# Patient Record
Sex: MALE | Race: WHITE | Employment: OTHER | URBAN - METROPOLITAN AREA
[De-identification: names, ages, dates, MRNs, and addresses within clinical notes are randomized per-mention and may not be internally consistent; named-entity substitution may affect disease eponyms.]

---

## 2017-10-11 ENCOUNTER — APPOINTMENT (OUTPATIENT)
Dept: CT IMAGING | Age: 77
DRG: 682 | End: 2017-10-11
Attending: EMERGENCY MEDICINE
Payer: MEDICARE

## 2017-10-11 ENCOUNTER — HOSPITAL ENCOUNTER (INPATIENT)
Age: 77
LOS: 2 days | Discharge: HOME | DRG: 682 | End: 2017-10-14
Attending: EMERGENCY MEDICINE | Admitting: FAMILY MEDICINE
Payer: MEDICARE

## 2017-10-11 DIAGNOSIS — R10.84 ABDOMINAL PAIN, GENERALIZED: Primary | ICD-10-CM

## 2017-10-11 DIAGNOSIS — R11.2 NON-INTRACTABLE VOMITING WITH NAUSEA, UNSPECIFIED VOMITING TYPE: ICD-10-CM

## 2017-10-11 DIAGNOSIS — D72.828 OTHER ELEVATED WHITE BLOOD CELL (WBC) COUNT: ICD-10-CM

## 2017-10-11 LAB
ALBUMIN SERPL-MCNC: 4.6 G/DL (ref 3.5–5)
ALBUMIN/GLOB SERPL: 1.2 {RATIO} (ref 1.1–2.2)
ALP SERPL-CCNC: 105 U/L (ref 45–117)
ALT SERPL-CCNC: 24 U/L (ref 12–78)
ANION GAP SERPL CALC-SCNC: 12 MMOL/L (ref 5–15)
AST SERPL-CCNC: 14 U/L (ref 15–37)
BASOPHILS # BLD: 0 K/UL (ref 0–0.1)
BASOPHILS NFR BLD: 0 % (ref 0–1)
BILIRUB SERPL-MCNC: 0.9 MG/DL (ref 0.2–1)
BUN SERPL-MCNC: 29 MG/DL (ref 6–20)
BUN/CREAT SERPL: 18 (ref 12–20)
CALCIUM SERPL-MCNC: 10.1 MG/DL (ref 8.5–10.1)
CHLORIDE SERPL-SCNC: 101 MMOL/L (ref 97–108)
CO2 SERPL-SCNC: 22 MMOL/L (ref 21–32)
CREAT SERPL-MCNC: 1.63 MG/DL (ref 0.7–1.3)
DIFFERENTIAL METHOD BLD: ABNORMAL
EOSINOPHIL # BLD: 0 K/UL (ref 0–0.4)
EOSINOPHIL NFR BLD: 0 % (ref 0–7)
ERYTHROCYTE [DISTWIDTH] IN BLOOD BY AUTOMATED COUNT: 14 % (ref 11.5–14.5)
GLOBULIN SER CALC-MCNC: 3.8 G/DL (ref 2–4)
GLUCOSE SERPL-MCNC: 243 MG/DL (ref 65–100)
HCT VFR BLD AUTO: 47 % (ref 36.6–50.3)
HGB BLD-MCNC: 16.4 G/DL (ref 12.1–17)
LYMPHOCYTES # BLD: 0.8 K/UL (ref 0.8–3.5)
LYMPHOCYTES NFR BLD: 4 % (ref 12–49)
MCH RBC QN AUTO: 29.9 PG (ref 26–34)
MCHC RBC AUTO-ENTMCNC: 34.9 G/DL (ref 30–36.5)
MCV RBC AUTO: 85.6 FL (ref 80–99)
MONOCYTES # BLD: 0.2 K/UL (ref 0–1)
MONOCYTES NFR BLD: 1 % (ref 5–13)
NEUTS SEG # BLD: 18.4 K/UL (ref 1.8–8)
NEUTS SEG NFR BLD: 95 % (ref 32–75)
PLATELET # BLD AUTO: 461 K/UL (ref 150–400)
PLATELET COMMENTS,PCOM: ABNORMAL
POTASSIUM SERPL-SCNC: 4.7 MMOL/L (ref 3.5–5.1)
PROT SERPL-MCNC: 8.4 G/DL (ref 6.4–8.2)
RBC # BLD AUTO: 5.49 M/UL (ref 4.1–5.7)
RBC MORPH BLD: ABNORMAL
SODIUM SERPL-SCNC: 135 MMOL/L (ref 136–145)
UR CULT HOLD, URHOLD: NORMAL
WBC # BLD AUTO: 19.4 K/UL (ref 4.1–11.1)

## 2017-10-11 PROCEDURE — 96365 THER/PROPH/DIAG IV INF INIT: CPT

## 2017-10-11 PROCEDURE — 85025 COMPLETE CBC W/AUTO DIFF WBC: CPT | Performed by: EMERGENCY MEDICINE

## 2017-10-11 PROCEDURE — 81001 URINALYSIS AUTO W/SCOPE: CPT | Performed by: EMERGENCY MEDICINE

## 2017-10-11 PROCEDURE — 96375 TX/PRO/DX INJ NEW DRUG ADDON: CPT

## 2017-10-11 PROCEDURE — 99284 EMERGENCY DEPT VISIT MOD MDM: CPT

## 2017-10-11 PROCEDURE — 36415 COLL VENOUS BLD VENIPUNCTURE: CPT | Performed by: EMERGENCY MEDICINE

## 2017-10-11 PROCEDURE — 74176 CT ABD & PELVIS W/O CONTRAST: CPT

## 2017-10-11 PROCEDURE — 96361 HYDRATE IV INFUSION ADD-ON: CPT

## 2017-10-11 PROCEDURE — 80053 COMPREHEN METABOLIC PANEL: CPT | Performed by: EMERGENCY MEDICINE

## 2017-10-11 PROCEDURE — 83690 ASSAY OF LIPASE: CPT | Performed by: EMERGENCY MEDICINE

## 2017-10-11 PROCEDURE — 74011250636 HC RX REV CODE- 250/636: Performed by: EMERGENCY MEDICINE

## 2017-10-11 RX ORDER — LEVOFLOXACIN 5 MG/ML
750 INJECTION, SOLUTION INTRAVENOUS
Status: COMPLETED | OUTPATIENT
Start: 2017-10-11 | End: 2017-10-12

## 2017-10-11 RX ORDER — HYDROMORPHONE HYDROCHLORIDE 1 MG/ML
0.5 INJECTION, SOLUTION INTRAMUSCULAR; INTRAVENOUS; SUBCUTANEOUS ONCE
Status: COMPLETED | OUTPATIENT
Start: 2017-10-11 | End: 2017-10-11

## 2017-10-11 RX ORDER — ONDANSETRON 2 MG/ML
8 INJECTION INTRAMUSCULAR; INTRAVENOUS
Status: COMPLETED | OUTPATIENT
Start: 2017-10-11 | End: 2017-10-11

## 2017-10-11 RX ADMIN — ONDANSETRON 8 MG: 2 INJECTION INTRAMUSCULAR; INTRAVENOUS at 22:47

## 2017-10-11 RX ADMIN — SODIUM CHLORIDE 1000 ML: 900 INJECTION, SOLUTION INTRAVENOUS at 22:47

## 2017-10-11 RX ADMIN — HYDROMORPHONE HYDROCHLORIDE 0.5 MG: 1 INJECTION, SOLUTION INTRAMUSCULAR; INTRAVENOUS; SUBCUTANEOUS at 22:47

## 2017-10-11 NOTE — IP AVS SNAPSHOT
[address] 
[phone] Patient: Eduardo Pastrana MRN: [id number] Current Discharge Medication List  
  
START taking these medications Dose & Instructions Dispensing Information Comments Morning Noon Evening Bedtime  
 levoFLOXacin 250 mg tablet Commonly known as:  Thersia Bring Your last dose was: Your next dose is:    
   
   
 Dose:  250 mg Take 1 Tab by mouth daily for 4 days. Quantity:  4 Tab Refills:  0 CONTINUE these medications which have NOT CHANGED Dose & Instructions Dispensing Information Comments Morning Noon Evening Bedtime  
 ibuprofen 400 mg tablet Commonly known as:  MOTRIN Your last dose was: Your next dose is:    
   
   
 Dose:  400 mg Take 400 mg by mouth every eight (8) hours as needed for Pain. Refills:  0  
     
   
   
   
  
 lisinopril 5 mg tablet Commonly known as:  Gertrudis Needy Your last dose was: Your next dose is:    
   
   
 Dose:  5 mg Take 5 mg by mouth daily. Refills:  0  
     
   
   
   
  
 therapeutic multivitamin tablet Commonly known as:  [facility] Your last dose was: Your next dose is:    
   
   
 Dose:  1 Tab Take 1 Tab by mouth daily. Refills:  0 Where to Get Your Medications Information on where to get these meds will be given to you by the nurse or doctor. ! Ask your nurse or doctor about these medications  
  levoFLOXacin 250 mg tablet

## 2017-10-11 NOTE — IP AVS SNAPSHOT
[address] 
[phone] Patient: Rose Rao MRN: [id number] You are allergic to the following Allergen Reactions Codeine Vertigo Contrast Dye (Iodine) Unknown (comments) Pcn (Penicillins) Hives Immunizations Administered for This Admission Name Date Influenza Vaccine (Quad) PF  Deferred () Recent Documentation Height Weight BMI Smoking Status 1.803 m 83.9 kg 25.8 kg/m2 Never Smoker Unresulted Labs Order Current Status CULTURE, BLOOD, PAIRED Preliminary result Emergency Contacts Name Discharge Info Relation Home Work Mobile Rosaline Eason DISCHARGE CAREGIVER [3] Daughter [21] [phone] About your hospitalization You were admitted on:  October 12, 2017 You last received care in the:  [place] [address] You were discharged on:  October 14, 2017 Unit phone number:  [phone] Why you were hospitalized Your primary diagnosis was:  José (Acute Kidney Injury) (Hcc) Your diagnoses also included:  Leukocytosis, Generalized Abdominal Pain Providers Seen During Your Hospitalizations Provider Role Specialty Primary office phone Beena Painting MD Attending Provider Emergency Medicine [phone] Yanet Hirsch MD Attending Provider [facility] [phone] Courtney Talley MD Attending Provider Internal Medicine [phone] Your Primary Care Physician (PCP) Primary Care Physician Office Phone Office Fax NONE ** None ** ** None ** Follow-up Information Follow up With Details Comments Contact Info None   None (395) Patient stated that they have no PCP None   None (395) Patient stated that they have no PCP Current Discharge Medication List  
  
START taking these medications Dose & Instructions Dispensing Information Comments Morning Noon Evening Bedtime  
 levoFLOXacin 250 mg tablet Commonly known as:  Amira Caal Your last dose was: Your next dose is:    
   
   
 Dose:  250 mg Take 1 Tab by mouth daily for 4 days. Quantity:  4 Tab Refills:  0 CONTINUE these medications which have NOT CHANGED Dose & Instructions Dispensing Information Comments Morning Noon Evening Bedtime  
 ibuprofen 400 mg tablet Commonly known as:  MOTRIN Your last dose was: Your next dose is:    
   
   
 Dose:  400 mg Take 400 mg by mouth every eight (8) hours as needed for Pain. Refills:  0  
     
   
   
   
  
 lisinopril 5 mg tablet Commonly known as:  Macie Burch Your last dose was: Your next dose is:    
   
   
 Dose:  5 mg Take 5 mg by mouth daily. Refills:  0  
     
   
   
   
  
 therapeutic multivitamin tablet Commonly known as:  [facility] Your last dose was: Your next dose is:    
   
   
 Dose:  1 Tab Take 1 Tab by mouth daily. Refills:  0 Where to Get Your Medications Information on where to get these meds will be given to you by the nurse or doctor. ! Ask your nurse or doctor about these medications  
  levoFLOXacin 250 mg tablet Discharge Instructions Discharge Instructions PATIENT ID: Yumiko Vallejo MRN: [id number] YOB: 1940 DATE OF ADMISSION: 10/11/2017  9:20 PM   
DATE OF DISCHARGE: 10/14/2017 PRIMARY CARE PROVIDER: None ATTENDING PHYSICIAN: Jordy Haro MD 
DISCHARGING PROVIDER: Jordy Haro MD   
To contact this individual call [phone] and ask the  to page. If unavailable ask to be transferred the Adult Hospitalist Department. DISCHARGE DIAGNOSES Liver lesions , dehydration . CONSULTATIONS: IP CONSULT TO HOSPITALIST IP CONSULT TO GENERAL SURGERY 
 
PROCEDURES/SURGERIES: * No surgery found * PENDING TEST RESULTS:  
At the time of discharge the following test results are still pending: FOLLOW UP APPOINTMENTS:  
Follow-up Information Follow up With Details Comments Contact Info None   None (395) Patient stated that they have no PCP None   None (395) Patient stated that they have no PCP 
  
  
  
 
ADDITIONAL CARE RECOMMENDATIONS:  
Please follow up with a PCP , and get repeat blood work , especially blood chemistry DIET: Regular Diet Oral Nutritional Supplements:  
 
ACTIVITY: Activity as tolerated WOUND CARE:  
 
EQUIPMENT needed:  
 
 
DISCHARGE MEDICATIONS: 
 See Medication Reconciliation Form · It is important that you take the medication exactly as they are prescribed. · Keep your medication in the bottles provided by the pharmacist and keep a list of the medication names, dosages, and times to be taken in your wallet. · Do not take other medications without consulting your doctor. NOTIFY YOUR PHYSICIAN FOR ANY OF THE FOLLOWING:  
Fever over 101 degrees for 24 hours. Chest pain, shortness of breath, fever, chills, nausea, vomiting, diarrhea, change in mentation, falling, weakness, bleeding. Severe pain or pain not relieved by medications. Or, any other signs or symptoms that you may have questions about. DISPOSITION: 
xx  Home With: 
 OT  PT  [facility]  RN  
  
 SNF/Inpatient Rehab/LTAC Independent/assisted living Hospice Other: CDMP Checked:  
Yes x PROBLEM LIST Updated: 
Yes x Signed:  
John Tellez MD 
10/14/2017 
12:15 PM 
 
Discharge Orders None Introducing [facility]! Fercho Lin introduces [employer] patient portal. Now you can access parts of your medical record, email your doctor's office, and request medication refills online. 1. In your internet browser, go to [email or web address] 2. Click on the First Time User? Click Here link in the Sign In box. You will see the New Member Sign Up page. 3. Enter your "[employer] Access Code exactly as it appears below. You will not need to use this code after youve completed the sign-up process. If you do not sign up before the expiration date, you must request a new code. · "[employer] Access Code: [email or web address] Expires: 1/12/2018 12:58 PM 
 
4. Enter the last four digits of your Social Security Number (xxxx) and Date of Birth (mm/dd/yyyy) as indicated and click Submit. You will be taken to the next sign-up page. 5. Create a "[employer] ID. This will be your "[employer] login ID and cannot be changed, so think of one that is secure and easy to remember. 6. Create a "[employer] password. You can change your password at any time. 7. Enter your Password Reset Question and Answer. This can be used at a later time if you forget your password. 8. Enter your e-mail address. You will receive e-mail notification when new information is available in [address] 9. Click Sign Up. You can now view and download portions of your medical record. 10. Click the Download Summary menu link to download a portable copy of your medical information. If you have questions, please visit the Frequently Asked Questions section of the "[employer] website. Remember, "[employer] is NOT to be used for urgent needs. For medical emergencies, dial 911. Now available from your iPhone and Android! General Information Please provide this summary of care documentation to your next provider. Patient Signature:  ____________________________________________________________ Date:  ____________________________________________________________  
  
Jyoti Wright Provider Signature:  ____________________________________________________________ Date:  ____________________________________________________________

## 2017-10-11 NOTE — IP AVS SNAPSHOT
Summary of Care Report The Summary of Care report has been created to help improve care coordination. Users with access to [employer] or [address] (Web-based application) may access additional patient information including the Discharge Summary. If you are not currently a [address] user and need more information, please call the number listed below in the [place] [address] section and ask to be connected with Medical Records. Facility Information Name Address Phone [address] [place] [id number] [phone] Patient Information Patient Name Sex  Alfred Bethea ([id number]) Male 1940 Discharge Information Admitting Provider Service Area Unit Brennon Arce MD / [address] [facility] / [phone] Discharge Provider Discharge Date/Time Discharge Disposition Destination (none) 10/14/2017 (Pending) AHR (none) Patient Language Language ENGLISH [13] Hospital Problems as of 10/14/2017  Reviewed: 10/12/2017  3:01 AM by Brennon Arce MD  
  
  
  
 Class Noted - Resolved Last Modified POA Active Problems Leukocytosis  10/12/2017 - Present 10/12/2017 by Brennon Arce MD Unknown Entered by Brennon Arce MD  
  Generalized abdominal pain  10/12/2017 - Present 10/12/2017 by Brennon Arce MD Unknown Entered by Brennon Arce MD  
  * (Principal)ADAM (acute kidney injury) [facility])  10/12/2017 - Present 10/12/2017 by Brennon Arce MD Unknown Entered by Brennon Arce MD  
  
Non-Hospital Problems as of 10/14/2017  Reviewed: 10/12/2017  3:01 AM by Brennon Arce MD  
 None You are allergic to the following Allergen Reactions Codeine Vertigo Contrast Dye (Iodine) Unknown (comments) Pcn (Penicillins) Hives Current Discharge Medication List  
  
 START taking these medications Dose & Instructions Dispensing Information Comments  
 levoFLOXacin 250 mg tablet Commonly [facility] as:  [facility] Dose:  250 mg Take 1 Tab by mouth daily for 4 days. Quantity:  4 Tab Refills:  0 CONTINUE these medications which have NOT CHANGED Dose & Instructions Dispensing Information Comments  
 ibuprofen 400 mg tablet Commonly known as:  MOTRIN Dose:  400 mg Take 400 mg by mouth every eight (8) hours as needed for Pain. Refills:  0  
   
 lisinopril 5 mg tablet Commonly known as:  [facility] Dose:  5 mg Take 5 mg by mouth daily. Refills:  0  
   
 therapeutic multivitamin tablet Commonly known as:  [facility] Dose:  1 Tab Take 1 Tab by mouth daily. Refills:  0 Current Immunizations Name Date Influenza Vaccine (Quad) PF  Deferred () Follow-up Information Follow up With Details Comments Contact Info None   None (395) Patient stated that they have no PCP None   None (395) Patient stated that they have no PCP Discharge Instructions Discharge Instructions PATIENT ID: Mariposa Navarrete MRN: [id number] YOB: 1940 DATE OF ADMISSION: 10/11/2017  9:20 PM   
DATE OF DISCHARGE: 10/14/2017 PRIMARY CARE PROVIDER: None ATTENDING PHYSICIAN: Piyush Palacios MD 
DISCHARGING PROVIDER: Piyush Palacios MD   
To contact this individual call [phone] and ask the  to page. If unavailable ask to be transferred the Adult Hospitalist Department. DISCHARGE DIAGNOSES Liver lesions , dehydration . CONSULTATIONS: IP CONSULT TO HOSPITALIST 
IP CONSULT TO GENERAL SURGERY 
 
PROCEDURES/SURGERIES: * No surgery found * PENDING TEST RESULTS:  
At the time of discharge the following test results are still pending: FOLLOW UP APPOINTMENTS:  
Follow-up Information Follow up With Details Comments Contact Info None   None (395) Patient stated that they have no PCP None   None (395) Patient stated that they have no PCP 
  
  
  
 
ADDITIONAL CARE RECOMMENDATIONS:  
Please follow up with a PCP , and get repeat blood work , especially blood chemistry DIET: Regular Diet Oral Nutritional Supplements:  
 
ACTIVITY: Activity as tolerated WOUND CARE:  
 
EQUIPMENT needed:  
 
 
  
 SNF/Inpatient Rehab/LTAC Independent/assisted living Hospice Other: CDMP Checked:  
Yes x PROBLEM LIST Updated: 
Yes x Signed:  
Ban Leach MD 
10/14/2017 
12:15 PM 
 
Chart Review Routing History No Routing History on File

## 2017-10-12 ENCOUNTER — APPOINTMENT (OUTPATIENT)
Dept: GENERAL RADIOLOGY | Age: 77
DRG: 682 | End: 2017-10-12
Attending: EMERGENCY MEDICINE
Payer: MEDICARE

## 2017-10-12 ENCOUNTER — APPOINTMENT (OUTPATIENT)
Dept: CT IMAGING | Age: 77
DRG: 682 | End: 2017-10-12
Attending: FAMILY MEDICINE
Payer: MEDICARE

## 2017-10-12 PROBLEM — R10.84 GENERALIZED ABDOMINAL PAIN: Status: ACTIVE | Noted: 2017-10-12

## 2017-10-12 PROBLEM — N17.9 AKI (ACUTE KIDNEY INJURY) (HCC): Status: ACTIVE | Noted: 2017-10-12

## 2017-10-12 PROBLEM — D72.829 LEUKOCYTOSIS: Status: ACTIVE | Noted: 2017-10-12

## 2017-10-12 LAB
ANION GAP SERPL CALC-SCNC: 12 MMOL/L (ref 5–15)
APPEARANCE UR: CLEAR
ATRIAL RATE: 82 BPM
BACTERIA URNS QL MICRO: NEGATIVE /HPF
BASOPHILS # BLD: 0 K/UL (ref 0–0.1)
BASOPHILS NFR BLD: 0 % (ref 0–1)
BILIRUB UR QL: NEGATIVE
BUN SERPL-MCNC: 28 MG/DL (ref 6–20)
BUN/CREAT SERPL: 18 (ref 12–20)
CALCIUM SERPL-MCNC: 8.7 MG/DL (ref 8.5–10.1)
CALCULATED P AXIS, ECG09: 61 DEGREES
CALCULATED R AXIS, ECG10: -32 DEGREES
CALCULATED T AXIS, ECG11: 4 DEGREES
CHLORIDE SERPL-SCNC: 104 MMOL/L (ref 97–108)
CO2 SERPL-SCNC: 20 MMOL/L (ref 21–32)
COLOR UR: ABNORMAL
CREAT SERPL-MCNC: 1.55 MG/DL (ref 0.7–1.3)
DIAGNOSIS, 93000: NORMAL
EOSINOPHIL # BLD: 0 K/UL (ref 0–0.4)
EOSINOPHIL NFR BLD: 0 % (ref 0–7)
EPITH CASTS URNS QL MICRO: ABNORMAL /LPF
ERYTHROCYTE [DISTWIDTH] IN BLOOD BY AUTOMATED COUNT: 14.2 % (ref 11.5–14.5)
EST. AVERAGE GLUCOSE BLD GHB EST-MCNC: 154 MG/DL
GLUCOSE BLD STRIP.AUTO-MCNC: 124 MG/DL (ref 65–100)
GLUCOSE BLD STRIP.AUTO-MCNC: 127 MG/DL (ref 65–100)
GLUCOSE BLD STRIP.AUTO-MCNC: 129 MG/DL (ref 65–100)
GLUCOSE BLD STRIP.AUTO-MCNC: 140 MG/DL (ref 65–100)
GLUCOSE BLD STRIP.AUTO-MCNC: 188 MG/DL (ref 65–100)
GLUCOSE SERPL-MCNC: 212 MG/DL (ref 65–100)
GLUCOSE UR STRIP.AUTO-MCNC: NEGATIVE MG/DL
HBA1C MFR BLD: 7 % (ref 4.2–6.3)
HCT VFR BLD AUTO: 44.9 % (ref 36.6–50.3)
HGB BLD-MCNC: 15.5 G/DL (ref 12.1–17)
HGB UR QL STRIP: NEGATIVE
KETONES UR QL STRIP.AUTO: 40 MG/DL
LACTATE SERPL-SCNC: 1.7 MMOL/L (ref 0.4–2)
LEUKOCYTE ESTERASE UR QL STRIP.AUTO: ABNORMAL
LIPASE SERPL-CCNC: 103 U/L (ref 73–393)
LYMPHOCYTES # BLD: 1.1 K/UL (ref 0.8–3.5)
LYMPHOCYTES NFR BLD: 6 % (ref 12–49)
MCH RBC QN AUTO: 29.5 PG (ref 26–34)
MCHC RBC AUTO-ENTMCNC: 34.5 G/DL (ref 30–36.5)
MCV RBC AUTO: 85.5 FL (ref 80–99)
MONOCYTES # BLD: 1.1 K/UL (ref 0–1)
MONOCYTES NFR BLD: 6 % (ref 5–13)
MUCOUS THREADS URNS QL MICRO: ABNORMAL /LPF
NEUTS SEG # BLD: 16 K/UL (ref 1.8–8)
NEUTS SEG NFR BLD: 88 % (ref 32–75)
NITRITE UR QL STRIP.AUTO: NEGATIVE
P-R INTERVAL, ECG05: 186 MS
PH UR STRIP: 5.5 [PH] (ref 5–8)
PLATELET # BLD AUTO: 444 K/UL (ref 150–400)
POTASSIUM SERPL-SCNC: 4.5 MMOL/L (ref 3.5–5.1)
PROT UR STRIP-MCNC: 100 MG/DL
Q-T INTERVAL, ECG07: 382 MS
QRS DURATION, ECG06: 92 MS
QTC CALCULATION (BEZET), ECG08: 446 MS
RBC # BLD AUTO: 5.25 M/UL (ref 4.1–5.7)
RBC #/AREA URNS HPF: ABNORMAL /HPF (ref 0–5)
SERVICE CMNT-IMP: ABNORMAL
SODIUM SERPL-SCNC: 136 MMOL/L (ref 136–145)
SP GR UR REFRACTOMETRY: 1.03 (ref 1–1.03)
UROBILINOGEN UR QL STRIP.AUTO: 0.2 EU/DL (ref 0.2–1)
VENTRICULAR RATE, ECG03: 82 BPM
WBC # BLD AUTO: 18.2 K/UL (ref 4.1–11.1)
WBC URNS QL MICRO: ABNORMAL /HPF (ref 0–4)

## 2017-10-12 PROCEDURE — 74011250636 HC RX REV CODE- 250/636: Performed by: EMERGENCY MEDICINE

## 2017-10-12 PROCEDURE — 70450 CT HEAD/BRAIN W/O DYE: CPT

## 2017-10-12 PROCEDURE — 85025 COMPLETE CBC W/AUTO DIFF WBC: CPT | Performed by: FAMILY MEDICINE

## 2017-10-12 PROCEDURE — 93041 RHYTHM ECG TRACING: CPT

## 2017-10-12 PROCEDURE — 74011250637 HC RX REV CODE- 250/637: Performed by: FAMILY MEDICINE

## 2017-10-12 PROCEDURE — 71010 XR CHEST PORT: CPT

## 2017-10-12 PROCEDURE — 83605 ASSAY OF LACTIC ACID: CPT | Performed by: EMERGENCY MEDICINE

## 2017-10-12 PROCEDURE — 82962 GLUCOSE BLOOD TEST: CPT

## 2017-10-12 PROCEDURE — 83036 HEMOGLOBIN GLYCOSYLATED A1C: CPT | Performed by: FAMILY MEDICINE

## 2017-10-12 PROCEDURE — 74011250636 HC RX REV CODE- 250/636: Performed by: FAMILY MEDICINE

## 2017-10-12 PROCEDURE — 36415 COLL VENOUS BLD VENIPUNCTURE: CPT | Performed by: FAMILY MEDICINE

## 2017-10-12 PROCEDURE — 74011636637 HC RX REV CODE- 636/637: Performed by: FAMILY MEDICINE

## 2017-10-12 PROCEDURE — 65270000029 HC RM PRIVATE

## 2017-10-12 PROCEDURE — 80048 BASIC METABOLIC PNL TOTAL CA: CPT | Performed by: FAMILY MEDICINE

## 2017-10-12 PROCEDURE — 87040 BLOOD CULTURE FOR BACTERIA: CPT | Performed by: EMERGENCY MEDICINE

## 2017-10-12 RX ORDER — SODIUM CHLORIDE 0.9 % (FLUSH) 0.9 %
5-10 SYRINGE (ML) INJECTION EVERY 8 HOURS
Status: DISCONTINUED | OUTPATIENT
Start: 2017-10-12 | End: 2017-10-14 | Stop reason: HOSPADM

## 2017-10-12 RX ORDER — LEVOFLOXACIN 5 MG/ML
750 INJECTION, SOLUTION INTRAVENOUS EVERY 24 HOURS
Status: DISCONTINUED | OUTPATIENT
Start: 2017-10-12 | End: 2017-10-12

## 2017-10-12 RX ORDER — LEVOFLOXACIN 5 MG/ML
750 INJECTION, SOLUTION INTRAVENOUS
Status: DISCONTINUED | OUTPATIENT
Start: 2017-10-14 | End: 2017-10-14 | Stop reason: HOSPADM

## 2017-10-12 RX ORDER — SODIUM CHLORIDE 0.9 % (FLUSH) 0.9 %
5-10 SYRINGE (ML) INJECTION AS NEEDED
Status: DISCONTINUED | OUTPATIENT
Start: 2017-10-12 | End: 2017-10-14 | Stop reason: HOSPADM

## 2017-10-12 RX ORDER — INSULIN LISPRO 100 [IU]/ML
INJECTION, SOLUTION INTRAVENOUS; SUBCUTANEOUS EVERY 6 HOURS
Status: DISCONTINUED | OUTPATIENT
Start: 2017-10-12 | End: 2017-10-14 | Stop reason: HOSPADM

## 2017-10-12 RX ORDER — MAGNESIUM SULFATE 100 %
4 CRYSTALS MISCELLANEOUS AS NEEDED
Status: DISCONTINUED | OUTPATIENT
Start: 2017-10-12 | End: 2017-10-14 | Stop reason: HOSPADM

## 2017-10-12 RX ORDER — DEXTROSE 50 % IN WATER (D50W) INTRAVENOUS SYRINGE
12.5-25 AS NEEDED
Status: DISCONTINUED | OUTPATIENT
Start: 2017-10-12 | End: 2017-10-14 | Stop reason: HOSPADM

## 2017-10-12 RX ORDER — ONDANSETRON 2 MG/ML
4 INJECTION INTRAMUSCULAR; INTRAVENOUS
Status: DISCONTINUED | OUTPATIENT
Start: 2017-10-12 | End: 2017-10-14 | Stop reason: HOSPADM

## 2017-10-12 RX ORDER — IBUPROFEN 400 MG/1
400 TABLET ORAL
COMMUNITY
End: 2018-05-26

## 2017-10-12 RX ORDER — LISINOPRIL 5 MG/1
10 TABLET ORAL DAILY
COMMUNITY

## 2017-10-12 RX ORDER — THERA TABS 400 MCG
1 TAB ORAL DAILY
COMMUNITY
End: 2018-05-26

## 2017-10-12 RX ORDER — SODIUM CHLORIDE 9 MG/ML
125 INJECTION, SOLUTION INTRAVENOUS CONTINUOUS
Status: DISCONTINUED | OUTPATIENT
Start: 2017-10-12 | End: 2017-10-13

## 2017-10-12 RX ADMIN — SODIUM CHLORIDE 125 ML/HR: 900 INJECTION, SOLUTION INTRAVENOUS at 03:57

## 2017-10-12 RX ADMIN — LEVOFLOXACIN 750 MG: 5 INJECTION, SOLUTION INTRAVENOUS at 00:39

## 2017-10-12 RX ADMIN — Medication 10 ML: at 16:59

## 2017-10-12 RX ADMIN — INSULIN LISPRO 2 UNITS: 100 INJECTION, SOLUTION INTRAVENOUS; SUBCUTANEOUS at 04:10

## 2017-10-12 RX ADMIN — SODIUM CHLORIDE 125 ML/HR: 900 INJECTION, SOLUTION INTRAVENOUS at 19:16

## 2017-10-12 RX ADMIN — NITROGLYCERIN 1 INCH: 20 OINTMENT TOPICAL at 05:16

## 2017-10-12 RX ADMIN — SODIUM CHLORIDE 1000 ML: 900 INJECTION, SOLUTION INTRAVENOUS at 03:57

## 2017-10-12 RX ADMIN — SODIUM CHLORIDE 125 ML/HR: 900 INJECTION, SOLUTION INTRAVENOUS at 12:40

## 2017-10-12 RX ADMIN — ONDANSETRON 4 MG: 2 INJECTION INTRAMUSCULAR; INTRAVENOUS at 05:54

## 2017-10-12 NOTE — PROGRESS NOTES
TRANSFER - OUT REPORT:    Verbal report given to JOSÉ Lugo(name) on [facility][1]  being transferred to [facility](unit) for routine progression of care       Report consisted of patients Situation, Background, Assessment and   Recommendations(SBAR). Information from the following report(s) SBAR was reviewed with the receiving nurse. Lines:   Peripheral IV 10/11/17 Left Wrist (Active)   Site Assessment Clean, dry, & intact 10/11/2017 11:54 PM   Phlebitis Assessment 0 10/11/2017 11:54 PM   Infiltration Assessment 0 10/11/2017 11:54 PM   Dressing Status Clean, dry, & intact 10/11/2017 11:54 PM        Opportunity for questions and clarification was provided.       Patient transported with:   [employer]

## 2017-10-12 NOTE — CONSULTS
General Surgery ER Consultation    Admit Date: 10/11/2017  Reason for Consultation: Abdominal pain & Hepatic lesions concerning for Possible Neoplasm    HPI:  Fern Schirmer is a 76 y.o. male with h/o Diabetes, HTN, meningioma & vertigo who recently relocated from [place] last week and presented to [facility] ED with c/o with abdominal pain and vomiting that began yesterday. His daughter is in room and helps serve as historian. He denies abdominal pain or nausea today, and he is very thirsty. He did have ginger ale last night without vomiting. Patient states that he has not had a BM in several days, and he is most concerned about this. He has a known large right inguinal hernia. He denies flatus. No fevers or chills. CT Abdomen & Pelvis without Contrast (10/11/2017) shows:  IMPRESSION:  Large right inguinal hernia contains loops of small bowel. There is no  associated direction.     There are numerous hepatic hypodensities which are not characterized. Concerning  for neoplasm. Contrast-enhanced CT of the abdomen and pelvis for further  delineation recommended.     Lipoma in the right thigh musculature.     Renal cysts. Patient Active Problem List    Diagnosis Date Noted    Leukocytosis 10/12/2017    Generalized abdominal pain 10/12/2017    ADAM (acute kidney injury) ([address] 10/12/2017     Past Medical History:   Diagnosis Date    Diabetes ([address]     diet controlled    Hypertension     Meningioma ([address]     Vertigo       Past Surgical History:   Procedure Laterality Date    HX TONSILLECTOMY        Social History   Substance Use Topics    Smoking status: Never Smoker    Smokeless tobacco: Never Used    Alcohol use No      History reviewed. No pertinent family history. Prior to Admission medications    Medication Sig Start Date End Date Taking? Authorizing Provider   lisinopril (PRINIVIL, ZESTRIL) 5 mg tablet Take 5 mg by mouth daily.    Yes Historical Provider   ibuprofen (MOTRIN) 400 mg tablet Take 400 mg by mouth every eight (8) hours as needed for Pain. Yes Historical Provider   therapeutic multivitamin (THERAGRAN) tablet Take 1 Tab by mouth daily. Yes Historical Provider     Allergies   Allergen Reactions    Codeine Vertigo    Contrast Dye [Iodine] Unknown (comments)    Pcn [Penicillins] Hives          Subjective:     Review of Systems:    A comprehensive review of systems was negative except for that written in the History of Present Illness. Objective:     Blood pressure 155/70, pulse 71, temperature 99.2 °F (37.3 °C), resp. rate 11, height 5' 11\" (1.803 m), weight 185 lb (83.9 kg), SpO2 97 %. Recent Results (from the past 24 hour(s))   CBC WITH AUTOMATED DIFF    Collection Time: 10/11/17  9:30 PM   Result Value Ref Range    WBC 19.4 (H) 4.1 - 11.1 K/uL    RBC 5.49 4. 10 - 5.70 M/uL    HGB 16.4 12.1 - 17.0 g/dL    HCT 47.0 36.6 - 50.3 %    MCV 85.6 80.0 - 99.0 FL    MCH 29.9 26.0 - 34.0 PG    MCHC 34.9 30.0 - 36.5 g/dL    RDW 14.0 11.5 - 14.5 %    PLATELET 662 (H) 312 - 400 K/uL    NEUTROPHILS 95 (H) 32 - 75 %    LYMPHOCYTES 4 (L) 12 - 49 %    MONOCYTES 1 (L) 5 - 13 %    EOSINOPHILS 0 0 - 7 %    BASOPHILS 0 0 - 1 %    ABS. NEUTROPHILS 18.4 (H) 1.8 - 8.0 K/UL    ABS. LYMPHOCYTES 0.8 0.8 - 3.5 K/UL    ABS. MONOCYTES 0.2 0.0 - 1.0 K/UL    ABS. EOSINOPHILS 0.0 0.0 - 0.4 K/UL    ABS.  BASOPHILS 0.0 0.0 - 0.1 K/UL    DF SMEAR SCANNED      PLATELET COMMENTS LARGE PLATELETS      RBC COMMENTS OVALOCYTES  PRESENT       METABOLIC PANEL, COMPREHENSIVE    Collection Time: 10/11/17  9:30 PM   Result Value Ref Range    Sodium 135 (L) 136 - 145 mmol/L    Potassium 4.7 3.5 - 5.1 mmol/L    Chloride 101 97 - 108 mmol/L    CO2 22 21 - 32 mmol/L    Anion gap 12 5 - 15 mmol/L    Glucose 243 (H) 65 - 100 mg/dL    BUN 29 (H) 6 - 20 MG/DL    Creatinine 1.63 (H) 0.70 - 1.30 MG/DL    BUN/Creatinine ratio 18 12 - 20      GFR est AA 50 (L) >60 ml/min/1.73m2    GFR est non-AA 41 (L) >60 ml/min/1.73m2    Calcium 10.1 8.5 - 10.1 MG/DL Bilirubin, total 0.9 0.2 - 1.0 MG/DL    ALT (SGPT) 24 12 - 78 U/L    AST (SGOT) 14 (L) 15 - 37 U/L    Alk.  phosphatase 105 45 - 117 U/L    Protein, total 8.4 (H) 6.4 - 8.2 g/dL    Albumin 4.6 3.5 - 5.0 g/dL    Globulin 3.8 2.0 - 4.0 g/dL    A-G Ratio 1.2 1.1 - 2.2     LIPASE    Collection Time: 10/11/17  9:30 PM   Result Value Ref Range    Lipase 103 73 - 393 U/L   URINALYSIS W/MICROSCOPIC    Collection Time: 10/11/17 11:45 PM   Result Value Ref Range    Color YELLOW/STRAW      Appearance CLEAR CLEAR      Specific gravity 1.027 1.003 - 1.030      pH (UA) 5.5 5.0 - 8.0      Protein 100 (A) NEG mg/dL    Glucose NEGATIVE  NEG mg/dL    Ketone 40 (A) NEG mg/dL    Bilirubin NEGATIVE  NEG      Blood NEGATIVE  NEG      Urobilinogen 0.2 0.2 - 1.0 EU/dL    Nitrites NEGATIVE  NEG      Leukocyte Esterase TRACE (A) NEG      WBC 0-4 0 - 4 /hpf    RBC 0-5 0 - 5 /hpf    Epithelial cells FEW FEW /lpf    Bacteria NEGATIVE  NEG /hpf    Mucus TRACE (A) NEG /lpf   URINE CULTURE HOLD SAMPLE    Collection Time: 10/11/17 11:45 PM   Result Value Ref Range    Urine culture hold URINE ON HOLD IN MICROBIOLOGY DEPT FOR 3 DAYS     LACTIC ACID    Collection Time: 10/12/17 12:34 AM   Result Value Ref Range    Lactic acid 1.7 0.4 - 2.0 MMOL/L   GLUCOSE, POC    Collection Time: 10/12/17  3:50 AM   Result Value Ref Range    Glucose (POC) 188 (H) 65 - 100 mg/dL    Performed by Keith Juan    METABOLIC PANEL, BASIC    Collection Time: 10/12/17  4:02 AM   Result Value Ref Range    Sodium 136 136 - 145 mmol/L    Potassium 4.5 3.5 - 5.1 mmol/L    Chloride 104 97 - 108 mmol/L    CO2 20 (L) 21 - 32 mmol/L    Anion gap 12 5 - 15 mmol/L    Glucose 212 (H) 65 - 100 mg/dL    BUN 28 (H) 6 - 20 MG/DL    Creatinine 1.55 (H) 0.70 - 1.30 MG/DL    BUN/Creatinine ratio 18 12 - 20      GFR est AA 53 (L) >60 ml/min/1.73m2    GFR est non-AA 43 (L) >60 ml/min/1.73m2    Calcium 8.7 8.5 - 10.1 MG/DL   CBC WITH AUTOMATED DIFF    Collection Time: 10/12/17  4:02 AM   Result Value Ref Range    WBC 18.2 (H) 4.1 - 11.1 K/uL    RBC 5.25 4. 10 - 5.70 M/uL    HGB 15.5 12.1 - 17.0 g/dL    HCT 44.9 36.6 - 50.3 %    MCV 85.5 80.0 - 99.0 FL    MCH 29.5 26.0 - 34.0 PG    MCHC 34.5 30.0 - 36.5 g/dL    RDW 14.2 11.5 - 14.5 %    PLATELET 867 (H) 488 - 400 K/uL    NEUTROPHILS 88 (H) 32 - 75 %    LYMPHOCYTES 6 (L) 12 - 49 %    MONOCYTES 6 5 - 13 %    EOSINOPHILS 0 0 - 7 %    BASOPHILS 0 0 - 1 %    ABS. NEUTROPHILS 16.0 (H) 1.8 - 8.0 K/UL    ABS. LYMPHOCYTES 1.1 0.8 - 3.5 K/UL    ABS. MONOCYTES 1.1 (H) 0.0 - 1.0 K/UL    ABS. EOSINOPHILS 0.0 0.0 - 0.4 K/UL    ABS. BASOPHILS 0.0 0.0 - 0.1 K/UL   HEMOGLOBIN A1C WITH EAG    Collection Time: 10/12/17  4:02 AM   Result Value Ref Range    Hemoglobin A1c 7.0 (H) 4.2 - 6.3 %    Est. average glucose 154 mg/dL   ECG RHYTHM ANALYSIS ADULT    Collection Time: 10/12/17  5:50 AM   Result Value Ref Range    Ventricular Rate 82 BPM    Atrial Rate 82 BPM    P-R Interval 186 ms    QRS Duration 92 ms    Q-T Interval 382 ms    QTC Calculation (Bezet) 446 ms    Calculated P Axis 61 degrees    Calculated R Axis -32 degrees    Calculated T Axis 4 degrees    Diagnosis       Normal sinus rhythm  Left axis deviation  Septal infarct , age undetermined  No previous ECGs available     GLUCOSE, POC    Collection Time: 10/12/17  6:00 AM   Result Value Ref Range    Glucose (POC) 129 (H) 65 - 100 mg/dL    Performed by Shima Camargo      _____________________  Physical Exam:     General:  Alert, cooperative, no distress, appears stated age. Eyes:   Sclera clear. Throat: Oral mucosa, and tongue dry. Neck: Supple, symmetrical, trachea midline. Lungs:   Clear to auscultation bilaterally. Heart:  Regular rate and rhythm. Abdomen:   Soft, round, non-tender. +BS. Large Right non-tender inguinal hernia, not reducible. Extremities: Extremities normal, atraumatic.          Assessment:   Principal Problem:    DAAM (acute [facility]) [facility] (10/12/2017)    Active Problems: Leukocytosis (10/12/2017)      Generalized abdominal pain (10/12/2017)        Plan:     Patient to be admitted under Hospitalist service. Further imaging to follow. Continue NPO. Monitor I&O. Further treatment per Dr. Candelaria Cevallos. Thank you for allowing us to participate in the care of this kind gentleman. Total time spent with patient: 20 minutes. Signed By: MARIELA Amado     October 12, 2017    ATTENDING ADDENDUM  I supervised the APC and reviewed the note. We discussed the plan of care  His hernia ia incarcerated bu he does not have strangulated gut. No obstruction and the hernia is not tender at all. I think the liver lesions may be nothing, but will order an MRI to settle the issue.

## 2017-10-12 NOTE — ED PROVIDER NOTES
Patient is a 76 y.o. male presenting with abdominal pain. Abdominal Pain    The problem has been gradually worsening. Associated symptoms include nausea and vomiting. Pertinent negatives include no diarrhea, no dysuria, no headaches and no chest pain. 76year old male with diet controlled Dm, htn, meningioma, vertigo, recently relocated from [place] last week, presents with abdominal pain and vomiting all day. Thinks it's because he hasn't had a bowel movement in 3 days. Unsure when symptoms started- daughter states today. No past GI or surgical history. COld all day today. Urine ok. Discomfort rated as 4/10. Past Medical History:   Diagnosis Date    Diabetes ([address]     diet controlled    Hypertension     [facility] [facility]     Vertigo        Past Surgical History:   Procedure Laterality Date    HX TONSILLECTOMY           History reviewed. No pertinent family history. Social History     Social History    Marital status:      Spouse name: N/A    Number of children: N/A    Years of education: N/A     Occupational History    Not on file. Social History Main Topics    Smoking status: Never Smoker    Smokeless tobacco: Never Used    Alcohol use No    Drug use: Not on file    Sexual activity: Not on file     Other Topics Concern    Not on file     Social History Narrative    No narrative on file         ALLERGIES: Codeine; Contrast dye [iodine]; and Pcn [penicillins]    Review of Systems   Constitutional: Positive for appetite change and chills. HENT: Negative for congestion. Eyes: Negative for visual disturbance. Respiratory: Negative for cough and shortness of breath. Cardiovascular: Negative for chest pain and leg swelling. Gastrointestinal: Positive for abdominal pain, nausea and vomiting. Negative for diarrhea. Endocrine: Negative for polyuria. Genitourinary: Negative for dysuria. Musculoskeletal: Negative for gait problem. Neurological: Negative for headaches. Psychiatric/Behavioral: Negative for dysphoric mood. Vitals:    10/11/17 2125   BP: (!) 215/85   Pulse: 66   Resp: 18   Temp: 98.5 °F (36.9 °C)   SpO2: 100%   Weight: 83.9 kg (185 lb)   Height: 5' 11\" (1.803 m)            Physical Exam   Constitutional: He is oriented to person, place, and time. He appears well-developed and well-nourished. No distress. HENT:   Head: Normocephalic and atraumatic. Mouth/Throat: Oropharynx is clear and moist. No oropharyngeal exudate. Eyes: Conjunctivae and EOM are normal. Pupils are equal, round, and reactive to light. Right eye exhibits no discharge. Left eye exhibits no discharge. No scleral icterus. Neck: Normal range of motion. Neck supple. No JVD present. Cardiovascular: Normal rate, regular rhythm, normal heart sounds and intact distal pulses. Exam reveals no gallop and no friction rub. No murmur heard. Pulmonary/Chest: Effort normal and breath sounds normal. No stridor. No respiratory distress. He has no wheezes. He has no rales. He exhibits no tenderness. Abdominal: Soft. Bowel sounds are normal. He exhibits no distension and no mass. There is tenderness (Rlq without rebound or guarding). There is no rebound and no guarding. Musculoskeletal: Normal range of motion. He exhibits no edema or tenderness. Neurological: He is alert and oriented to person, place, and time. He has normal reflexes. No cranial nerve deficit. He exhibits normal muscle tone. Coordination normal.   Skin: Skin is warm and dry. No rash noted. No erythema. Psychiatric: He has a normal mood and affect. His behavior is normal. Judgment and thought content normal.        MDM  ED Course       Procedures    WBC elevated 19K- zofran/dilaudid, IV fluids and CT scan. CT non acute. Urine clear. Zosyn was given empirically for high WBC. Will admit for observation.

## 2017-10-12 NOTE — ED NOTES
ED EKG interpretation:  Rhythm: normal sinus rhythm; and regular . Rate (approx.): 82; Axis: left axis deviation; P wave: normal; QRS interval: normal ; ST/T wave: non-specific changes; This EKG was interpreted by Terri Arshad MD,ED Provider.

## 2017-10-12 NOTE — DIABETES MGMT
DTC Consult Note    Recommendations/ Comments: Blood sugars well controlled today and A1C reflects good control prior to admit. Patient reports following a meal plan most of the time, but having a poor appetite recently. Consult received for:  [x]             Assessment of home management    Chart reviewed and initial evaluation complete on [employer]. Patient is a 76 y.o. male with a history of Type 2 Diabetes on diet only at home. Assessed and instructed patient on the following:   ·  interpretation of lab results, blood sugar goals, complications of diabetes mellitus and nutrition    Provided patient with the following: [x]             Survival skills education materials               [x]             Outpatient DTC contact number    Discussed with patient and/or family need for follow up appointment for diabetes management after discharge. A1c:   Lab Results   Component Value Date/Time    Hemoglobin A1c 7.0 10/12/2017 04:02 AM       Recent Glucose Results:   Lab Results   Component Value Date/Time     (H) 10/12/2017 04:02 AM     (H) 10/11/2017 09:30 PM    GLUCPOC 124 (H) 10/12/2017 04:32 PM    GLUCPOC 140 (H) 10/12/2017 12:39 PM    GLUCPOC 129 (H) 10/12/2017 06:00 AM        Lab Results   Component Value Date/Time    Creatinine 1.55 10/12/2017 04:02 AM     Estimated Creatinine Clearance: 41.2 mL/min (based on Cr of 1.55). Active Orders   Diet    DIET CLEAR LIQUID        PO intake: No data found. Current hospital DM medication: Humalog for correction, normal sensitivity    Will continue to follow as needed. Thank you.   Meg Grace, MS, RN, CDE

## 2017-10-12 NOTE — PROGRESS NOTES
Patient admitted this am by Dr Brittney Ballesteros .  I have estrella nd examined and discuss case with daughter

## 2017-10-12 NOTE — ED NOTES
Patient was found in the chair in his room with all of his clothes off able to answer questions appropriately but has some confusion. Hospitalist at bedside, reoriented patient.   Patient back in bed, door open in sight of nurses station, will continue to monitor

## 2017-10-12 NOTE — PROGRESS NOTES
TRANSFER - IN REPORT:    Verbal report received from [facility] RN(name) on [employer]  being received from ED(unit) for routine progression of care      Report consisted of patients Situation, Background, Assessment and   Recommendations(SBAR). Information from the following report(s) SBAR, ED Summary and Recent Results was reviewed with the receiving nurse. Opportunity for questions and clarification was provided. Assessment completed upon patients arrival to unit and care assumed.

## 2017-10-12 NOTE — H&P
[address] [place] [address], [address] [place] [address]   HISTORY AND PHYSICAL       Name:  Leslie Vines   MR#:  [id number]   :  1940   Account #:  [de-identified]        Date of Adm:  10/11/2017       CHIEF COMPLAINT: The patient does not provide. HISTORY OF PRESENT ILLNESS: A 28-year-old white male with past   medical history of type 2 diabetes mellitus, hypertension, meningioma   and vertigo who presented to the emergency department from home   with reported chief complaint of abdominal pain, nausea and vomiting. At current, the patient is very confused, was initially seen standing up   next to his stretcher in the ER confused, uncooperative. The patient   was assisted back to his bed in order to complete the rest of his   evaluation. As such, the patient is a poor historian. The majority   of history has been obtained from review of ED and medical reports. Per the collective reports, the patient initially had complained of having   abdominal pain, nausea and vomiting, constipation with no bowel   movements for the past prior 3 days. It was uncertain as to the onset of   symptoms. The patient reportedly had abdominal pain that was rated   4/10. He also reportedly had cold like symptoms. Per the ED, it was   noted that the patient recently relocated from [place] last week. On   arrival in the emergency department, initial recorded vital signs were   blood pressure 215/85, heart rate 66, respiratory rate 18, O2 saturation   100% on room air. His labs showed elevated WBC of 19,400,   neutrophils 95%. Urine ketones of 40, BUN of 29, creatinine 1.63. GFR   41, blood glucose 243, in terms of abnormal labs. CT abdomen and   pelvis without contrast shows large right inguinal hernia with loops of   small bowel. No evidence of obstruction.  Numerous hepatic   hypodensities which were not characterized, however, concerning   for neoplasm, lipoma and the right thigh musculature, renal cyst and lumbar canal and foraminal stenosis. Per the ED, the patient was   started on levofloxacin 750 mg IV, Dilaudid 0.5 mg IV, Zofran 8 mg IV,   and 0.9% normal saline, 1000 mL IV fluid bolus. The patient is now   seen for admission to the hospitalist service for continued evaluation   and treatment. PAST MEDICAL HISTORY:   1. Type 2 diabetes mellitus. 2. Hypertension. 3. Meningioma. 4. Vertigo. PAST SURGICAL HISTORY: Tonsillectomy. MEDICATIONS: None listed. ALLERGIES:   1. CODEINE. 2. IV DYE. 3. PENICILLIN. SOCIAL HISTORY: No documented reports of smoking, alcohol, illicit   drugs; however, the patient not providing a reliable history. FAMILY HISTORY: Unknown, unable to obtain from patient. He is not   providing a reliable history. REVIEW OF SYSTEMS   Unable to obtain a complete review of systems. The patient is not   answering all questions appropriately. PHYSICAL EXAMINATION   VITAL SIGNS: Temperature 98.5 degrees Fahrenheit, blood pressure   161/90, heart rate 95, respiratory rate 12, saturation 97% on room air. Recorded weight 185 pounds (83.9 kg). Recorded height 5 feet 11   inches tall. GENERAL: Elderly male in no acute respiratory distress. PSYCHIATRIC: The patient is awake, alert and oriented x1 to name   only. Otherwise, he is very confused, intermittently uncooperative,   anxious, slightly agitated. NEUROLOGIC: GCS 13 (E4, V3, M6) best response with continuous   eye opening, confused speech, and follows some intermittent   commands. Moves extremities x4. Unstable gait. Sensation   grossly intact. No slurred speech, no facial droop. HEENT: Normocephalic, atraumatic. PERRLA intact. Sclerae anicteric. Conjunctivae clear. Nares are patent. Tongue is midline,   nonedematous. NECK: Supple without lymphadenopathy. No JVD, carotid bruits,   thyromegaly. LYMPH: Negative for cervical or supraclavicular lymphadenopathy.    RESPIRATORY: Lungs are clear to auscultation bilaterally. CARDIOVASCULAR: Heart is regular rate and rhythm, normal S1, S2   without murmurs, rubs or gallops. GI: Abdomen soft, nontender, nondistended. Normoactive bowel   sounds. No rebound, guarding or rigidity. No auscultated abdominal   bruits. No pulsatile mass. : Large scrotum with hernia, noted erythema, nontender. BACK: No CVA tenderness. No stepoff deformity. MUSCULOSKELETAL: No acute palpable bony deformity. Negative for   calf tenderness. VASCULAR: 2+ radial, 1+ dorsalis pedis pulses without   cyanosis, clubbing, edema. SKIN: Warm and dry. LABORATORY: I reviewed as follows. Sodium 135, potassium 4.7,   chloride 101, CO2 of 22, BUN of 29, creatinine 1.63. Glucose 243,   anion gap of 12. Calcium 10.1, GFR 41, total bilirubin 0.9, total protein   84, albumin 4.6. ALT 24, AST 14, alkaline phosphate 105. Lipase 103,   lactic acid 1.7. WBC 19.4, hemoglobin 15.4, hematocrit 47.0, platelets   are 414. Neutrophils 95%. Urinalysis: Leukocyte esterase trace, nitrites   negative, urobilinogen 0.2, bilirubin negative, blood negative, ketones   40, glucose negative, protein 100, pH 5.5. Specific gravity 1.027. WBC   0-4, RBCs 0-5, bacteria negative. CT abdomen and pelvis without   contrast. Results are reviewed and noted in HPI. Chest x-ray portable,   no acute cardiopulmonary process. IMPRESSION AND PLAN:   1. Acute kidney injury/dehydration to Telemetry. Place on IV fluids,   hydration. Repeat renal count in the a.m. Placed on strict I's and O's   and daily weights. 2. Leukocytosis. Direct source of infection unknown. Continue with IV   fluids. The patient has already been started on levofloxacin,   IV antibiotics. May continue on the same for now. He has limited   antibiotic options with noted history of ALLERGIES TO PENICILLIN. Check blood cultures and adjust antibiotics accordingly. 3. Liver lesions. Concerning for neoplasm. Consult with General   Surgery for further evaluation. 4. Large inguinal hernia involving the scrotum. Consult with General   Surgery in regards to same. 5. Right thigh lipoma. Consult with General Surgery for further   recommendations and treatments. 6. Altered mental status. Uncertain of patient's baseline mental status. Will order CT of the head to rule out any acute process. 7. Ketonuria. Suggest fasting state of poor p.o. intake. Order IV fluids   and hydration. 8. Type 2 diabetes mellitus, hypoglycemia. Place on Humalog insulin   correction coverage, scheduled Accu-Cheks. Check hemoglobin A1c   level in a.m. The patient otherwise has a normal anion gap and serum   CO2 level low was still within normal limits. 9. Generalized abdominal pain. No acute abdominal findings on current   exam.   10. Nausea and vomiting. Provided Zofran as needed. 11. Hypertensive urgency. Will order nitroglycerin for added blood   pressure control. Stat dose now. Provide additional antihypertensive   medications. Uncertain of the patient's home medications as list has   not been provided. 12. Generalized weakness/debility. Placed on fall   precautions. Venous thromboembolism prophylaxis. Sequential   compression devices, bilateral lower extremities. OLIVA Viera MD      MP / CD   D:  10/12/2017   05:16   T:  10/12/2017   07:35   Job #:  [id number]

## 2017-10-12 NOTE — CDMP QUERY
Please clarify if this patient is being treated/managed for:    =>SIRS due to a noninfectious progress  with Acute Organ dysfunction in the setting of Abd pain, Leukocytosis with no clear source of infection, & ADAM requiring IVF bolus, IVF, Daily labs with renal function tests, started on Levofloxacin, Direct source of infection unknown. =>Other Explanation of clinical findings  =>Unable to Determine (no explanation of clinical findings)    The medical record reflects the following clinical findings, treatment, and risk factors:    Risk Factors: Elderly, Abd. Pain, Large liver lesions, possible neoplasm  Clinical Indicators: 2/4 SIRS (HR > 90s up to103, WBC @ 19.4--no clear source of infection, Acute Organ dysfunction of AADM & AMS. Treatment: IVF,IV abx,-though direct source of infection unknown---Blood cx-----Ct of Abd & Head, Daily Labs including renal function tests, & strict monitor of I&Os     Please clarify and document your clinical opinion in the progress notes and discharge summary including the definitive and/or presumptive diagnosis, (suspected or probable), related to the above clinical findings. Please include clinical findings supporting your diagnosis.       Thank you  Sandrita Pierce, ELISABETHN, RN, [address]  [phone]

## 2017-10-12 NOTE — PROGRESS NOTES
Day #1 of Levaquin  Indication:  Sepsis  Current regimen:  750 mg IV q24h  Abx regimen: Levaquin  Recent Labs      10/12/17   0402  10/11/17   2130   WBC  18.2*  19.4*   CREA  1.55*  1.63*   BUN  28*  29*     Est CrCl: 41.2 ml/min  Temp (24hrs), Av.2 °F (37.3 °C), Min:98.5 °F (36.9 °C), Max:99.6 °F (37.6 °C)    Cultures: 10/12 - blood - pending    Plan: Change to 750 mg IV q48h for CrCl 20-49 ml/min.

## 2017-10-12 NOTE — ED TRIAGE NOTES
Pt arrives from [facility] with abd pain and n/v x 24 hours. Reports being unable to keep anything down. Afebrile. Last BM was 3 days ago.

## 2017-10-12 NOTE — ED NOTES
Bedside and Verbal shift change report given to Jefry Jimenez (oncoming nurse) by Kenzie Hines RN (offgoing nurse). Report included the following information ED Summary.

## 2017-10-12 NOTE — PROGRESS NOTES
Primary Nurse Kahtleen Man, Student Nurse and Gabriela Willard RN performed a dual skin assessment on this patient No impairment noted  Delvin score is 19.

## 2017-10-13 ENCOUNTER — APPOINTMENT (OUTPATIENT)
Dept: MRI IMAGING | Age: 77
DRG: 682 | End: 2017-10-13
Attending: SURGERY
Payer: MEDICARE

## 2017-10-13 ENCOUNTER — APPOINTMENT (OUTPATIENT)
Dept: MRI IMAGING | Age: 77
DRG: 682 | End: 2017-10-13
Attending: INTERNAL MEDICINE
Payer: MEDICARE

## 2017-10-13 LAB
ALBUMIN SERPL-MCNC: 3.3 G/DL (ref 3.5–5)
ALBUMIN/GLOB SERPL: 1.2 {RATIO} (ref 1.1–2.2)
ALP SERPL-CCNC: 70 U/L (ref 45–117)
ALT SERPL-CCNC: 19 U/L (ref 12–78)
ANION GAP SERPL CALC-SCNC: 7 MMOL/L (ref 5–15)
AST SERPL-CCNC: 14 U/L (ref 15–37)
BASOPHILS # BLD: 0 K/UL (ref 0–0.1)
BASOPHILS NFR BLD: 0 % (ref 0–1)
BILIRUB SERPL-MCNC: 0.9 MG/DL (ref 0.2–1)
BUN SERPL-MCNC: 19 MG/DL (ref 6–20)
BUN/CREAT SERPL: 15 (ref 12–20)
CALCIUM SERPL-MCNC: 8.4 MG/DL (ref 8.5–10.1)
CHLORIDE SERPL-SCNC: 107 MMOL/L (ref 97–108)
CO2 SERPL-SCNC: 23 MMOL/L (ref 21–32)
CREAT SERPL-MCNC: 1.28 MG/DL (ref 0.7–1.3)
EOSINOPHIL # BLD: 0.1 K/UL (ref 0–0.4)
EOSINOPHIL NFR BLD: 1 % (ref 0–7)
ERYTHROCYTE [DISTWIDTH] IN BLOOD BY AUTOMATED COUNT: 14.4 % (ref 11.5–14.5)
GLOBULIN SER CALC-MCNC: 2.7 G/DL (ref 2–4)
GLUCOSE BLD STRIP.AUTO-MCNC: 122 MG/DL (ref 65–100)
GLUCOSE BLD STRIP.AUTO-MCNC: 133 MG/DL (ref 65–100)
GLUCOSE BLD STRIP.AUTO-MCNC: 139 MG/DL (ref 65–100)
GLUCOSE BLD STRIP.AUTO-MCNC: 187 MG/DL (ref 65–100)
GLUCOSE BLD STRIP.AUTO-MCNC: 194 MG/DL (ref 65–100)
GLUCOSE SERPL-MCNC: 205 MG/DL (ref 65–100)
HCT VFR BLD AUTO: 39.4 % (ref 36.6–50.3)
HGB BLD-MCNC: 13.1 G/DL (ref 12.1–17)
LYMPHOCYTES # BLD: 1.1 K/UL (ref 0.8–3.5)
LYMPHOCYTES NFR BLD: 10 % (ref 12–49)
MCH RBC QN AUTO: 29.1 PG (ref 26–34)
MCHC RBC AUTO-ENTMCNC: 33.2 G/DL (ref 30–36.5)
MCV RBC AUTO: 87.6 FL (ref 80–99)
MONOCYTES # BLD: 0.8 K/UL (ref 0–1)
MONOCYTES NFR BLD: 7 % (ref 5–13)
NEUTS SEG # BLD: 9.1 K/UL (ref 1.8–8)
NEUTS SEG NFR BLD: 82 % (ref 32–75)
PLATELET # BLD AUTO: 310 K/UL (ref 150–400)
POTASSIUM SERPL-SCNC: 3.9 MMOL/L (ref 3.5–5.1)
PROT SERPL-MCNC: 6 G/DL (ref 6.4–8.2)
RBC # BLD AUTO: 4.5 M/UL (ref 4.1–5.7)
SERVICE CMNT-IMP: ABNORMAL
SODIUM SERPL-SCNC: 137 MMOL/L (ref 136–145)
WBC # BLD AUTO: 11.2 K/UL (ref 4.1–11.1)

## 2017-10-13 PROCEDURE — 74011000258 HC RX REV CODE- 258: Performed by: INTERNAL MEDICINE

## 2017-10-13 PROCEDURE — 74011250636 HC RX REV CODE- 250/636: Performed by: INTERNAL MEDICINE

## 2017-10-13 PROCEDURE — 80053 COMPREHEN METABOLIC PANEL: CPT | Performed by: INTERNAL MEDICINE

## 2017-10-13 PROCEDURE — 74011250637 HC RX REV CODE- 250/637: Performed by: INTERNAL MEDICINE

## 2017-10-13 PROCEDURE — 82962 GLUCOSE BLOOD TEST: CPT

## 2017-10-13 PROCEDURE — 85025 COMPLETE CBC W/AUTO DIFF WBC: CPT | Performed by: INTERNAL MEDICINE

## 2017-10-13 PROCEDURE — 74011250636 HC RX REV CODE- 250/636: Performed by: FAMILY MEDICINE

## 2017-10-13 PROCEDURE — 77030021566 MRI ABD W CONT

## 2017-10-13 PROCEDURE — 65270000029 HC RM PRIVATE

## 2017-10-13 PROCEDURE — 74181 MRI ABDOMEN W/O CONTRAST: CPT

## 2017-10-13 PROCEDURE — A9577 INJ MULTIHANCE: HCPCS | Performed by: INTERNAL MEDICINE

## 2017-10-13 PROCEDURE — 36415 COLL VENOUS BLD VENIPUNCTURE: CPT | Performed by: INTERNAL MEDICINE

## 2017-10-13 RX ORDER — POLYETHYLENE GLYCOL 3350 17 G/17G
17 POWDER, FOR SOLUTION ORAL DAILY
Status: DISCONTINUED | OUTPATIENT
Start: 2017-10-13 | End: 2017-10-14 | Stop reason: HOSPADM

## 2017-10-13 RX ADMIN — GADOBENATE DIMEGLUMINE 17 ML: 529 INJECTION, SOLUTION INTRAVENOUS at 20:52

## 2017-10-13 RX ADMIN — SODIUM CHLORIDE 125 ML/HR: 900 INJECTION, SOLUTION INTRAVENOUS at 02:46

## 2017-10-13 RX ADMIN — SODIUM CHLORIDE 100 ML: 900 INJECTION, SOLUTION INTRAVENOUS at 20:52

## 2017-10-13 RX ADMIN — Medication 10 ML: at 16:04

## 2017-10-13 RX ADMIN — POLYETHYLENE GLYCOL 3350 17 G: 17 POWDER, FOR SOLUTION ORAL at 13:36

## 2017-10-13 RX ADMIN — Medication 10 ML: at 00:33

## 2017-10-13 RX ADMIN — Medication 10 ML: at 06:16

## 2017-10-13 NOTE — PROGRESS NOTES
Spiritual Care Assessment/Progress Notes    Chito You [id number]  [id number]    1940  76 y.o.  male    Patient Telephone Number: [phone] (home)   Moravian Affiliation: Baptist   Language: English   Extended Emergency Contact Information  Primary Emergency Contact: Leandro Whitmore Phone: [phone]  Relation: Daughter   Patient Active Problem List    Diagnosis Date Noted    Leukocytosis 10/12/2017    Generalized abdominal pain 10/12/2017    ADAM (acute kidney injury) ([facility] [address] 10/12/2017        Date: 10/13/2017       Level of Moravian/Spiritual Activity:  []         Involved in rebecca tradition/spiritual practice    []         Not involved in rebecca tradition/spiritual practice  []         Spiritually oriented    []         Claims no spiritual orientation    []         seeking spiritual identity  []         Feels alienated from Gnosticism practice/tradition  []         Feels angry about Gnosticism practice/tradition  [x]         Spirituality/Gnosticism tradition IS a resource for coping at this time.   []         Not able to assess due to medical condition    Services Provided Today:  []         crisis intervention    []         reading Scriptures  [x]         spiritual assessment    [x]         prayer  [x]         empathic listening/emotional support  []         rites and rituals (cite in comments)  []         life review     []         Gnosticism support  []         theological development   []         advocacy  []         ethical dialog     []         blessing  []         bereavement support    [x]         support to family  []         anticipatory grief support   [x]         help with AMD  []         spiritual guidance    []         meditation      Spiritual Care Needs  [x]         Emotional Support  []         Spiritual/Moravian Care  []         Loss/Adjustment  []         Advocacy/Referral                /Ethics  []         No needs expressed at this time  [x]         Other: (note in               comments)  Spiritual Care Plan  []         Follow up visits with               pt/family  []         Provide materials  []         Schedule sacraments  []         Contact Community               Clergy  [x]         Follow up as needed  []         Other: (note in               comments)     Comments: Visited Mr Delaney Louis in room 512 in regards to a consult for an AMD. Mr Lazo's two daughters, Art Roche, were present at time of visit. Mr Delaney Louis confirmed that he did want to complete an AMD. Patient was able to accurately state his location, day, situation, and name of the current [address] [place] [address],[address] president. Mr Delaney Louis stated that he wanted his daughter, Carla Ortez, to be his Primary Agent and his daughter, ANGELIKA, to be his Successor Agent.  reviewed the AMD document with Mr Delaney Louis and his two daughters. Their questions and concerns were answered according to their stated satisfaction. Mr Lazo's signature was witnessed by Jhony Gerber RN and this . Mr Delaney Louis was given the original document and two copies and each of his daughters were given a copy. An additional copy was retained and placed on Mr Lazo's chart. Mr Delaney Louis shared that he was [place]. His daughter, ANGELIKA said she was a enmanuel in a [employer] in [address] [place] [address]. With their permission assured patient and family of prayers on their behalf. Also informed them of 24 hour  availability for support. : Rev. Katelyn Riley.  Cathie Magana; [facility], to contact [phone] call: [phone]

## 2017-10-13 NOTE — PROGRESS NOTES
Surgery Progress Note    Admit Date: 10/11/2017      Subjective:    No complaints. Passing gas. Denies nausea or vomiting. Tolerating regular diet. Objective:     Patient Vitals for the past 8 hrs:   BP Temp Pulse Resp SpO2   10/13/17 1117 137/78 99 °F (37.2 °C) 73 18 97 %   10/13/17 0817 143/86 97.7 °F (36.5 °C) 63 18 96 %     10/13 0701 - 10/13 1900  In: -   Out: 250 [Urine:250]  10/11 1901 - 10/13 0700  In: 1661.7 [P.O.:120; I.V.:1541.7]  Out: 1000 [Urine:1000]ip  Physical Exam:    General: alert, cooperative, no distress  Cardiac: normal S1 and S2  Lungs: Normal chest wall and respirations. Clear to auscultation. Abdomen: soft, nondistended, nontender. Right inguinal hernia        CBC: Lab Results   Component Value Date/Time    WBC 11.2 10/13/2017 11:35 AM    RBC 4.50 10/13/2017 11:35 AM    HGB 13.1 10/13/2017 11:35 AM    HCT 39.4 10/13/2017 11:35 AM    PLATELET 370 28/67/2120 11:35 AM     BMP: Lab Results   Component Value Date/Time    Glucose 205 10/13/2017 11:35 AM    Sodium 137 10/13/2017 11:35 AM    Potassium 3.9 10/13/2017 11:35 AM    Chloride 107 10/13/2017 11:35 AM    CO2 23 10/13/2017 11:35 AM    BUN 19 10/13/2017 11:35 AM    Creatinine 1.28 10/13/2017 11:35 AM    Calcium 8.4 10/13/2017 11:35 AM     CMP:  Lab Results   Component Value Date/Time    Glucose 205 10/13/2017 11:35 AM    Sodium 137 10/13/2017 11:35 AM    Potassium 3.9 10/13/2017 11:35 AM    Chloride 107 10/13/2017 11:35 AM    CO2 23 10/13/2017 11:35 AM    BUN 19 10/13/2017 11:35 AM    Creatinine 1.28 10/13/2017 11:35 AM    Calcium 8.4 10/13/2017 11:35 AM    Anion gap 7 10/13/2017 11:35 AM    BUN/Creatinine ratio 15 10/13/2017 11:35 AM    Alk.  phosphatase 70 10/13/2017 11:35 AM    Protein, total 6.0 10/13/2017 11:35 AM    Albumin 3.3 10/13/2017 11:35 AM    Globulin 2.7 10/13/2017 11:35 AM    A-G Ratio 1.2 10/13/2017 11:35 AM               Assessment:   Patient Active Hospital Problem List:   ADAM (acute kidney injury) ([facility] [address] (10/12/2017)     Leukocytosis (10/12/2017)     Generalized abdominal pain (10/12/2017)          Plan:   Diet: advance as tolerated  Activity: ad leno  Pain management  GI and DVT prophylaxis  Awaiting MRI results. Levaquin   Further plan per Dr. Mack Penaloza.    Yamile Hurtado, NP

## 2017-10-13 NOTE — PROGRESS NOTES
Problem: Falls - Risk of  Goal: *Absence of Falls  Document Nash Fall Risk and appropriate interventions in the flowsheet.    Outcome: Progressing Towards Goal  Fall Risk Interventions:  Mobility Interventions: Communicate number of staff needed for ambulation/transfer, Bed/chair exit alarm, Patient to call before getting OOB     Mentation Interventions: Reorient patient, Room close to nurse's station, Door open when patient unattended, Bed/chair exit alarm     Medication Interventions: Patient to call before getting OOB, Teach patient to arise slowly     Elimination Interventions: Bed/chair exit alarm, Call light in reach, Patient to call for help with toileting needs

## 2017-10-13 NOTE — PROGRESS NOTES
Physical Therapy:  Orders received and acknowledged. Spoke to [facility]. Patient and family speaking with care provider. Will f/u as able to complete PT evaluation.     Ken Golden, PT

## 2017-10-13 NOTE — PROGRESS NOTES
Second attempt. Patient still in with pastoral care. Two daughters present, asking that PT follow up tomorrow for evaluation. Of note, upon entering the room, patient ambulating with supervision and a RW into the bathroom. Offered assistance, however patient deferring at this time. Will follow up for PT evaluation in AM as appropriate. Thanks!     Promise Blackman PT, DPT

## 2017-10-13 NOTE — PROGRESS NOTES
Hospitalist Progress Note         Ericka Tabares MD     Call physician on-call through the  7pm-7am    Daily Progress Note: 10/13/2017    Admission summary and hospital course   64-year-old white male with past   medical history of type 2 diabetes mellitus, hypertension, meningioma   and vertigo who presented to the emergency department from home   with reported chief complaint of abdominal pain, nausea and vomiting. At current, the patient is very confused, was initially seen standing up   next to his stretcher in the ER confused, uncooperative. Assessment/Plan:     · Acute nausea and vomiting and abdominal pain    likely from uremia and now improved , surgery on board and patient has hernia without strangulation , needs outpatient follow up , he has started eating solids now   · ADAM and dehydration : improved with iv fluids . · Leukocytosis  , improved , no source of infection so far , urine and cxr is clear . Likely from dehydration   · Metabolic encephalopathy , probably back to baseline , not sure dementia or not ?  Needs neuro psych evaluation outpatient   · DM  , on SSI , accuchecks   · Liver masses , incidental hypo densities on CT , unfortunately MRI was done without contrast and now inconclusive , and see below further    · Meningioma  , CT shows sphenoidal mass and patient has history of meningioma , CD of MRI of brain sent to radiology to read and upload in our system   · Hypertensive urgency on admission , etiology unknown but now stable   · Constipation, started on miralax        Talked family in details everyday about the plan   His dehydration has improved and is eating solids     His liver masses stay indeterminate as patient refused iv dye for MRI ,   RECOMMENDED TO FOLLOW UP  WITH DR Patricia Rahman FOR HERNIA AND POSSIBLE REPEAT MRI WITH CONTRAST IN ONE OR TWO WEEKS   NO INPATIENT NEEDS AT THIS MOMENT   FAMILY REFUSING DISCHARGE because of emotional burden of cancer or no cancer  AND WANT ANSWER TODAY IF HE HAS CANCER OR NOT , WILL DEFER TO DR Nyla Mendosa ABOUT THE MRI AND LIVER LESIONS     VTE prophylaxis:  Discussed plan of care with Patient/Family and Nurse   Pre-admission lived at   Discharge planning:PT/OT          Subjective: The patient is  Unhappy about \"suspicion of cancer \"    Review of Systems:   A comprehensive review of systems was negative except mentioned in A/P    Objective:   Physical Exam:     Visit Vitals    /87 (BP 1 Location: Right arm, BP Patient Position: At rest)    Pulse 67    Temp 98.4 °F (36.9 °C)    Resp 18    Ht 5' 11\" (1.803 m)    Wt 83.9 kg (185 lb)    SpO2 97%    BMI 25.8 kg/m2      O2 Device: Room air    Temp (24hrs), Av.6 °F (37 °C), Min:97.7 °F (36.5 °C), Max:99.4 °F (37.4 °C)    10/13 0701 - 10/13 1900  In: 1832.1 [P.O.:480; I.V.:1352.1]  Out: 250 [Urine:250]   10/11 1901 - 10/13 0700  In: 3126.3 [P.O.:120; I.V.:3006.3]  Out: 1000 [Urine:1000]    General:  Alert,  no distress, appears stated age. Lungs:   Clear to auscultation bilaterally. Chest wall:  No tenderness or deformity. Heart:  Regular rate and rhythm, S1, S2 normal, no murmur, click, rub or gallop. Abdomen:   Soft, non-tender. Bowel sounds normal. No masses,  No organomegaly. Extremities: Extremities normal, atraumatic, no cyanosis or edema. Pulses: 2+ and symmetric all extremities. Skin: Skin color, texture, turgor normal. No rashes or lesions   Neurologic: CNII-XII intact.       Data Review:       Recent Days:  Recent Labs      10/13/17   1135  10/12/17   0402  10/11/17   2130   WBC  11.2*  18.2*  19.4*   HGB  13.1  15.5  16.4   HCT  39.4  44.9  47.0   PLT  310  444*  461*     Recent Labs      10/13/17   1135  10/12/17   0402  10/11/17   2130   NA  137  136  135*   K  3.9  4.5  4.7   CL  107  104  101   CO2  23  20*  22   GLU  205*  212*  243*   BUN  19  28*  29*   CREA  1.28  1.55*  1.63*   CA  8.4*  8.7  10.1   ALB  3.3*   --   4.6   SGOT  14* --   14*   ALT  19   --   24     No results for input(s): PH, PCO2, PO2, HCO3, FIO2 in the last 72 hours. 24 Hour Results:  Recent Results (from the past 24 hour(s))   GLUCOSE, POC    Collection Time: 10/12/17  8:22 PM   Result Value Ref Range    Glucose (POC) 127 (H) 65 - 100 mg/dL    Performed by Shon Leggett    GLUCOSE, POC    Collection Time: 10/13/17 12:29 AM   Result Value Ref Range    Glucose (POC) 122 (H) 65 - 100 mg/dL    Performed by Urmila Cruz    GLUCOSE, POC    Collection Time: 10/13/17  6:25 AM   Result Value Ref Range    Glucose (POC) 133 (H) 65 - 100 mg/dL    Performed by YOU ASIF    METABOLIC PANEL, COMPREHENSIVE    Collection Time: 10/13/17 11:35 AM   Result Value Ref Range    Sodium 137 136 - 145 mmol/L    Potassium 3.9 3.5 - 5.1 mmol/L    Chloride 107 97 - 108 mmol/L    CO2 23 21 - 32 mmol/L    Anion gap 7 5 - 15 mmol/L    Glucose 205 (H) 65 - 100 mg/dL    BUN 19 6 - 20 MG/DL    Creatinine 1.28 0.70 - 1.30 MG/DL    BUN/Creatinine ratio 15 12 - 20      GFR est AA >60 >60 ml/min/1.73m2    GFR est non-AA 54 (L) >60 ml/min/1.73m2    Calcium 8.4 (L) 8.5 - 10.1 MG/DL    Bilirubin, total 0.9 0.2 - 1.0 MG/DL    ALT (SGPT) 19 12 - 78 U/L    AST (SGOT) 14 (L) 15 - 37 U/L    Alk. phosphatase 70 45 - 117 U/L    Protein, total 6.0 (L) 6.4 - 8.2 g/dL    Albumin 3.3 (L) 3.5 - 5.0 g/dL    Globulin 2.7 2.0 - 4.0 g/dL    A-G Ratio 1.2 1.1 - 2.2     CBC WITH AUTOMATED DIFF    Collection Time: 10/13/17 11:35 AM   Result Value Ref Range    WBC 11.2 (H) 4.1 - 11.1 K/uL    RBC 4.50 4. 10 - 5.70 M/uL    HGB 13.1 12.1 - 17.0 g/dL    HCT 39.4 36.6 - 50.3 %    MCV 87.6 80.0 - 99.0 FL    MCH 29.1 26.0 - 34.0 PG    MCHC 33.2 30.0 - 36.5 g/dL    RDW 14.4 11.5 - 14.5 %    PLATELET 144 446 - 958 K/uL    NEUTROPHILS 82 (H) 32 - 75 %    LYMPHOCYTES 10 (L) 12 - 49 %    MONOCYTES 7 5 - 13 %    EOSINOPHILS 1 0 - 7 %    BASOPHILS 0 0 - 1 %    ABS. NEUTROPHILS 9.1 (H) 1.8 - 8.0 K/UL    ABS.  LYMPHOCYTES 1.1 0.8 - 3.5 K/UL ABS. MONOCYTES 0.8 0.0 - 1.0 K/UL    ABS. EOSINOPHILS 0.1 0.0 - 0.4 K/UL    ABS. BASOPHILS 0.0 0.0 - 0.1 K/UL   GLUCOSE, POC    Collection Time: 10/13/17 11:58 AM   Result Value Ref Range    Glucose (POC) 187 (H) 65 - 100 mg/dL    Performed by Beto Castañeda    GLUCOSE, POC    Collection Time: 10/13/17  4:45 PM   Result Value Ref Range    Glucose (POC) 139 (H) 65 - 100 mg/dL    Performed by Thiago Wyman        Problem List:  Problem List as of 10/13/2017  Date Reviewed: 10/12/2017          Codes Class Noted - Resolved    Leukocytosis ICD-10-CM: E24.677  ICD-9-CM: 288.60  10/12/2017 - Present        Generalized abdominal pain ICD-10-CM: R10.84  ICD-9-CM: 789.07  10/12/2017 - Present        * (Principal)ADAM (acute kidney [facility]) ([facility] ICD-10-CM: N17.9  ICD-9-CM: 584.9  10/12/2017 - Present              Medications reviewed  Current Facility-Administered Medications   Medication Dose Route Frequency    polyethylene glycol (MIRALAX) packet 17 g  17 g Oral DAILY    sodium chloride (NS) flush 5-10 mL  5-10 mL IntraVENous Q8H    sodium chloride (NS) flush 5-10 mL  5-10 mL IntraVENous PRN    ondansetron (ZOFRAN) injection 4 mg  4 mg IntraVENous Q6H PRN    glucose chewable tablet 16 g  4 Tab Oral PRN    dextrose (D50W) injection syrg 12.5-25 g  12.5-25 g IntraVENous PRN    glucagon (GLUCAGEN) injection 1 mg  1 mg IntraMUSCular PRN    insulin lispro (HUMALOG) injection   SubCUTAneous Q6H    [START ON 10/14/2017] levoFLOXacin (LEVAQUIN) 750 mg in D5W IVPB  750 mg IntraVENous Q48H    influenza vaccine 2017-18 (3 yrs+)(PF) (FLUZONE QUAD/FLUARIX QUAD) injection 0.5 mL  0.5 mL IntraMUSCular PRIOR TO [address] discussed with: Nurse    Total time spent with patient: 30 minutes.     Yimi Dumnot MD

## 2017-10-13 NOTE — ACP (ADVANCE CARE PLANNING)
Visited Mr Jose Guadalupe Jacobs in room 512 in regards to a consult for an AMD. Mr Lazo's two daughters, Malia Castorena, were present at time of visit. Mr Jose Guadalupe Jacobs confirmed that he did want to complete an AMD. Patient was able to accurately state his location, day, situation, and name of the current [address] [place] [address],[address] president. Mr Jose Guadalupe Jacobs stated that he wanted his daughter, Terese Chang, to be his Primary Agent and his daughter, Desiree Antonio, to be his Successor Agent.  reviewed the AMD document with Mr Jose Guadalupe Jacobs and his two daughters. Their questions and concerns were answered according to their stated satisfaction. Mr Lazo's signature was witnessed by Jadiel Snell RN and this . Mr Jose Guadalupe Jacobs was given the original document and two copies and each of his daughters were given a copy. An additional copy was retained and placed on Mr Lazo's chart. : . Leeroy Trevino.  Vaibhav Wang; [facility], to contact [phone] [address] call: [phone]

## 2017-10-13 NOTE — PROGRESS NOTES
Bedside shift change report given to [facility] (oncoming nurse) by Miguel Benitez RN (offgoing nurse). Report included the following information SBAR, Kardex, Intake/Output, MAR and Recent Results.

## 2017-10-13 NOTE — PROGRESS NOTES
Bedside shift change report given to Sigifredo Gonzalez (oncoming nurse) by Irish Babinski (offgoing nurse). Report included the following information SBAR     I have read and agree with the student Laurine Alpers documentation. Alaina Nash RN.

## 2017-10-14 VITALS
OXYGEN SATURATION: 95 % | RESPIRATION RATE: 18 BRPM | BODY MASS INDEX: 25.9 KG/M2 | SYSTOLIC BLOOD PRESSURE: 137 MMHG | WEIGHT: 185 LBS | DIASTOLIC BLOOD PRESSURE: 80 MMHG | TEMPERATURE: 99.7 F | HEART RATE: 77 BPM | HEIGHT: 71 IN

## 2017-10-14 LAB
GLUCOSE BLD STRIP.AUTO-MCNC: 124 MG/DL (ref 65–100)
GLUCOSE BLD STRIP.AUTO-MCNC: 128 MG/DL (ref 65–100)
SERVICE CMNT-IMP: ABNORMAL
SERVICE CMNT-IMP: ABNORMAL

## 2017-10-14 PROCEDURE — 74011250637 HC RX REV CODE- 250/637: Performed by: INTERNAL MEDICINE

## 2017-10-14 PROCEDURE — G8978 MOBILITY CURRENT STATUS: HCPCS

## 2017-10-14 PROCEDURE — G8979 MOBILITY GOAL STATUS: HCPCS

## 2017-10-14 PROCEDURE — 97161 PT EVAL LOW COMPLEX 20 MIN: CPT

## 2017-10-14 PROCEDURE — 97162 PT EVAL MOD COMPLEX 30 MIN: CPT

## 2017-10-14 PROCEDURE — 82962 GLUCOSE BLOOD TEST: CPT

## 2017-10-14 RX ORDER — LEVOFLOXACIN 250 MG/1
250 TABLET ORAL DAILY
Qty: 4 TAB | Refills: 0 | Status: SHIPPED | OUTPATIENT
Start: 2017-10-14 | End: 2017-10-18

## 2017-10-14 RX ADMIN — POLYETHYLENE GLYCOL 3350 17 G: 17 POWDER, FOR SOLUTION ORAL at 09:18

## 2017-10-14 NOTE — PROGRESS NOTES
Bedside and Verbal shift change report given to Giselle Landeros (oncoming nurse) by Rj Stewart (offgoing nurse). Report included the following information SBAR, Kardex, Intake/Output, MAR, Accordion and Recent Results.

## 2017-10-14 NOTE — DISCHARGE INSTRUCTIONS
Discharge Instructions       PATIENT ID: Angel Perdomo  MRN: [id number]   YOB: 1940    DATE OF ADMISSION: 10/11/2017  9:20 PM    DATE OF DISCHARGE: 10/14/2017    PRIMARY CARE PROVIDER: None     ATTENDING PHYSICIAN: Yakelin Pressley MD  DISCHARGING PROVIDER: Yakelin Pressley MD    To contact this individual call [phone] and ask the  to page. If unavailable ask to be transferred the Adult Hospitalist Department. DISCHARGE DIAGNOSES   Liver lesions , dehydration . CONSULTATIONS: IP CONSULT TO HOSPITALIST  IP CONSULT TO GENERAL SURGERY    PROCEDURES/SURGERIES: * No surgery found *    PENDING TEST RESULTS:   At the time of discharge the following test results are still pending:     FOLLOW UP APPOINTMENTS:   Follow-up Information     Follow up With Details Comments Contact Info    None   None (395) Patient stated that they have no PCP      None   None (395) Patient stated that they have no PCP             ADDITIONAL CARE RECOMMENDATIONS:   Please follow up with a PCP , and get repeat blood work , especially blood chemistry     DIET: Regular Diet  Oral Nutritional Supplements:     ACTIVITY: Activity as tolerated    WOUND CARE:     EQUIPMENT needed:       DISCHARGE MEDICATIONS:   See Medication Reconciliation Form    · It is important that you take the medication exactly as they are prescribed. · Keep your medication in the bottles provided by the pharmacist and keep a list of the medication names, dosages, and times to be taken in your wallet. · Do not take other medications without consulting your doctor. NOTIFY YOUR PHYSICIAN FOR ANY OF THE FOLLOWING:   Fever over 101 degrees for 24 hours. Chest pain, shortness of breath, fever, chills, nausea, vomiting, diarrhea, change in mentation, falling, weakness, bleeding. Severe pain or pain not relieved by medications. Or, any other signs or symptoms that you may have questions about.       DISPOSITION:  xx  Home With:   OT  PT  [facility]  RN SNF/Inpatient Rehab/LTAC    Independent/assisted living    Hospice    Other:     CDMP Checked:   Yes x     PROBLEM LIST Updated:  Yes x       Signed:   Nemo Lanza MD  10/14/2017  12:15 PM

## 2017-10-14 NOTE — PROGRESS NOTES
Patient refused 0000 dose of Levaquin. Patient stated, \"I don't know who ordered that medication and I don't know why I am taking it. \" Nurse explained to the patient that the levaquin was ordered by Dr. Linus Mancilla because of elevated WBC on admission and because of a suspected infection. The patient said he did not feel comfortable taking this medication until someone (a doctor) came and explained to him why he needed this medication. Nurse called Dr. Linus Mancilla to let him know that this patient was refusing this medication. The nurse went back to the patient's room to explain the medication and the reason for the medication. Patient continued to refuse medication.

## 2017-10-14 NOTE — PROGRESS NOTES
Problem: Mobility Impaired (Adult and Pediatric)  Goal: *Acute Goals and Plan of Care (Insert Text)  Physical Therapy Goals  Initiated 10/14/2017  1. Patient will move from supine to sit and sit to supine , scoot up and down and roll side to side in bed with modified independence within 7 day(s). 2. Patient will transfer from bed to chair and chair to bed with modified independence using the least restrictive device within 7 day(s). 3. Patient will perform sit to stand with modified independence within 7 day(s). 4. Patient will ambulate with modified independence for 150 feet with the least restrictive device within 7 day(s). PHYSICAL THERAPY EVALUATION  Patient: Whitney Negro (63 y.o. male)  Date: 10/14/2017  Primary Diagnosis: Generalized abdominal pain  ADAM (acute kidney injury) ([facility]  Leukocytosis        Precautions:  Fall      ASSESSMENT :  Based on the objective data described below, the patient presents with impaired functional mobility secondary to impaired balance, generalized weakness, mild gait impairments, ROM limitations, decreased safety awareness, and impulsivity. Pt received supine in bed with daughter at bedside and agreeable to PT evaluation. Pt cleared by nursing for mobility. Bed mobility performed mod I and sit<>stand transfers with SBA, however pt does require constant VCs for safe hand placement with use of RW during transfers. He ambulated 80' with CGA-SBA while using RW, however demonstrates path deviations, slow gait speed, and increased trunk flexion throughout. He requires additional cueing to safely use RW during gait as he occasionally steps outside of frame of RW. He was assisted onto EOB and left with all needs met, daughters present, bed alarm on, and RN aware following therapy session. Recommend pt return home with HHPT and initial 24/7 supervision. PT will continue to follow pt while he is in the hospital to address functional impairments.      Patient will benefit from skilled intervention to address the above impairments. Patients rehabilitation potential is considered to be Fair  Factors which may influence rehabilitation potential include:   [ ]         None noted  [X]         Mental ability/status  [X]         Medical condition  [ ]         Home/family situation and support systems  [X]         Safety awareness  [ ]         Pain tolerance/management  [ ]         Other:        PLAN :  Recommendations and Planned Interventions:  [X]           Bed Mobility Training             [ ]    Neuromuscular Re-Education  [X]           Transfer Training                   [ ]    Orthotic/Prosthetic Training  [X]           Gait Training                         [ ]    Modalities  [X]           Therapeutic Exercises           [ ]    Edema Management/Control  [X]           Therapeutic Activities            [X]    Patient and Family Training/Education  [ ]           Other (comment):     Frequency/Duration: Patient will be followed by physical therapy  4 times a week to address goals. Discharge Recommendations: Home Health and initial 24/7 supervision  Further Equipment Recommendations for Discharge: None (pt owns RW and rollator)       SUBJECTIVE:   Patient stated I have both.       OBJECTIVE DATA SUMMARY:   HISTORY:    Past Medical History:   Diagnosis Date    Diabetes ([facility]       diet controlled    Hypertension      Meningioma ([facility]      Vertigo       Past Surgical History:   Procedure Laterality Date    HX TONSILLECTOMY         Prior Level of Function/Home Situation: Pt typically uses rollator for mobility at baseline. Lives alone in senior apartments. Last GLF ~ 1 year ago. Has supportive family, however they do not live in the area. Does not drive or work.   Personal factors and/or comorbidities impacting plan of care: vertigo     Home Situation  Home Environment: Assisted living  # Steps to Enter: 0  One/Two Story Residence: One story (elevator in building)  Living Alone: Yes  Support Systems: Child(maribell)  Patient Expects to be Discharged to[de-identified] Assisted living  Current DME Used/Available at Home: Grab bars, Walker     EXAMINATION/PRESENTATION/DECISION MAKING:   Critical Behavior:  Neurologic State: Alert  Orientation Level: Disoriented to person, Disoriented to place  Cognition: Impulsive, Poor safety awareness     Hearing: Auditory  Auditory Impairment: Hard of hearing, bilateral  Skin:  Intact  Edema: None  Range Of Motion:  AROM: Generally decreased, functional           PROM: Generally decreased, functional           Strength:    Strength: Generally decreased, functional                    Tone & Sensation:   Tone: Normal              Sensation: Intact               Coordination:  Coordination: Within functional limits  Vision:      Functional Mobility:  Bed Mobility:  Rolling: Modified independent  Supine to Sit: Modified independent     Scooting: Modified independent  Transfers:  Sit to Stand: Stand-by asssistance  Stand to Sit: Stand-by asssistance                       Balance:   Sitting: Intact  Standing: Impaired; Without support  Standing - Static: Good;Constant support  Standing - Dynamic : Fair  Ambulation/Gait Training:  Distance (ft): 90 Feet (ft)  Assistive Device: Gait belt;Walker, rolling  Ambulation - Level of Assistance: Contact guard assistance;Stand-by asssistance; Additional time; Adaptive equipment     Gait Description (WDL): Exceptions to WDL  Gait Abnormalities: Decreased step clearance; Path deviations (ambulating on lateral edge of L foot)        Base of Support: Widened     Speed/Valarie: Pace decreased (<100 feet/min)  Step Length: Left shortened;Right shortened        Functional Measure:  Barthel Index:      Bathin  Bladder: 10  Bowels: 10  Groomin  Dressin  Feedin  Mobility: 10  Stairs: 5  Toilet Use: 5  Transfer (Bed to Chair and Back): 10  Total: 60         Barthel and G-code impairment scale:  Percentage of impairment CH  0% CI  1-19% CJ  20-39% CK  40-59% CL  60-79% CM  80-99% CN  100%   Barthel Score 0-100 100 99-80 79-60 59-40 20-39 1-19    0   Barthel Score 0-20 20 17-19 13-16 9-12 5-8 1-4 0      The Barthel ADL Index: Guidelines  1. The index should be used as a record of what a patient does, not as a record of what a patient could do. 2. The main aim is to establish degree of independence from any help, physical or verbal, however minor and for whatever reason. 3. The need for supervision renders the patient not independent. 4. A patient's performance should be established using the best available evidence. Asking the patient, friends/relatives and nurses are the usual sources, but direct observation and common sense are also important. However direct testing is not needed. 5. Usually the patient's performance over the preceding 24-48 hours is important, but occasionally longer periods will be relevant. 6. Middle categories imply that the patient supplies over 50 per cent of the effort. 7. Use of aids to be independent is allowed. Andrez Fabian., Barthel, D.W. ([id number]). Functional evaluation: the Barthel Index. [facility] (14)2. Josefa Stokes octavia ZAID Rodriguez, King Elizondo., Trish Estrella., Felicia Nyhan, [address] [place] [address] (1999). Measuring the change indisability after inpatient rehabilitation; comparison of the responsiveness of the Barthel Index and Functional Hooker Measure. Journal of Neurology, Neurosurgery, and Psychiatry, 66(4), 325-906. Nemo Hannah, N.J.A, BAUDILIO Alcantara, & Masood Nicole, M.A. (2004.) Assessment of post-stroke quality of life in cost-effectiveness studies: The usefulness of the Barthel Index and the EuroQoL-5D. Quality of Life Research, 13, 821-89         G codes: In compliance with CMSs Claims Based Outcome Reporting, the following G-code set was chosen for this patient based on their primary functional limitation being treated:      The outcome measure chosen to determine the severity of the functional limitation was the Barthel Index with a score of 60/100 which was correlated with the impairment scale. · Mobility - Walking and Moving Around:               - CURRENT STATUS:    CJ - 20%-39% impaired, limited or restricted               - GOAL STATUS:           CI - 1%-19% impaired, limited or restricted               - D/C STATUS:                       ---------------To be determined---------------      Physical Therapy Evaluation Charge Determination   History Examination Presentation Decision-Making   MEDIUM  Complexity : 1-2 comorbidities / personal factors will impact the outcome/ POC  HIGH Complexity : 4+ Standardized tests and measures addressing body structure, function, activity limitation and / or participation in recreation  MEDIUM Complexity : Evolving with changing characteristics  Other outcome measures Barthel Index MEDIUM      Based on the above components, the patient evaluation is determined to be of the following complexity level: MEDIUM     Pain:  Pain Scale 1: Numeric (0 - 10)  Pain Intensity 1: 0              Activity Tolerance:   Good - pt without complaints  Please refer to the flowsheet for vital signs taken during this treatment. After treatment:   [ ]         Patient left in no apparent distress sitting up in chair  [X]         Patient left in no apparent distress in bed - sitting on EOB  [X]         Call bell left within reach  [X]         Nursing notified  [X]         Caregiver present  [X]         Bed alarm activated      COMMUNICATION/EDUCATION:   The patients plan of care was discussed with: Physical Therapist and Registered Nurse.  [X]         Fall prevention education was provided and the patient/caregiver indicated understanding. [X]         Patient/family have participated as able in goal setting and plan of care. [X]         Patient/family agree to work toward stated goals and plan of care.   [ ]         Patient understands intent and goals of therapy, but is neutral about his/her participation. [ ]         Patient is unable to participate in goal setting and plan of care.      Thank you for this referral.  Tara Domingo, PT, DPT   Time Calculation: 16 mins

## 2017-10-17 LAB
BACTERIA SPEC CULT: NORMAL
SERVICE CMNT-IMP: NORMAL

## 2017-10-20 NOTE — DISCHARGE SUMMARY
Discharge Summary       PATIENT ID: Lana Matson  MRN: [id number]   YOB: 1940    DATE OF ADMISSION: 10/11/2017  9:20 PM    DATE OF DISCHARGE: 10/14/17    PRIMARY CARE PROVIDER: None     ATTENDING PHYSICIAN: Gary Lin   DISCHARGING PROVIDER: Chetna Yoon MD    To contact this individual call [phone] and ask the  to page. If unavailable ask to be transferred the Adult Hospitalist Department. CONSULTATIONS: IP CONSULT TO GENERAL SURGERY    PROCEDURES/SURGERIES: * No surgery found *    ADMITTING DIAGNOSES & HOSPITAL COURSE:     64-year-old white male with past   medical history of type 2 diabetes mellitus, hypertension, meningioma   and vertigo who presented to the emergency department from home   with reported chief complaint of abdominal pain, nausea and vomiting. At current, the patient is very confused, was initially seen standing up   next to his stretcher in the ER confused, uncooperative. The patient   was assisted back to his bed in order to complete the rest of his   evaluation. As such, the patient is a poor historian. The majority   of history has been obtained from review of ED and medical reports. Per the collective reports, the patient initially had complained of having   abdominal pain, nausea and vomiting, constipation with no bowel   movements for the past prior 3 days. It was uncertain as to the onset of   symptoms. The patient reportedly had abdominal pain that was rated   4/10. He also reportedly had cold like symptoms. Per the ED, it was   noted that the patient recently relocated from [place] last week. On   arrival in the emergency department, initial recorded vital signs were   blood pressure 215/85, heart rate 66, respiratory rate 18, O2 saturation   100% on room air. His labs showed elevated WBC of 19,400,   neutrophils 95%. Urine ketones of 40, BUN of 29, creatinine 1.63. GFR   41, blood glucose 243, in terms of abnormal labs.  CT abdomen and   pelvis without contrast shows large right inguinal hernia with loops of   small bowel. No evidence of obstruction. Numerous hepatic   hypodensities which were not characterized, however, concerning   for neoplasm, lipoma and the right thigh musculature, renal cyst and   lumbar canal and foraminal stenosis. Per the ED, the patient was   started on levofloxacin 750 mg IV, Dilaudid 0.5 mg IV, Zofran 8 mg IV,   and 0.9% normal saline, 1000 mL IV fluid bolus. The patient is now   seen for admission to the hospitalist service for continued evaluation   and treatment.      PAST MEDICAL HISTORY:   1. Type 2 diabetes mellitus. 2. Hypertension. 3. Meningioma. 4. Vertigo. LABORATORY: I reviewed as follows. Sodium 135, potassium 4.7,   chloride 101, CO2 of 22, BUN of 29, creatinine 1.63. Glucose 243,   anion gap of 12. Calcium 10.1, GFR 41, total bilirubin 0.9, total protein   84, albumin 4.6. ALT 24, AST 14, alkaline phosphate 105. Lipase 103,   lactic acid 1.7. WBC 19.4, hemoglobin 15.4, hematocrit 47.0, platelets   are 843. Neutrophils 95%. Urinalysis: Leukocyte esterase trace, nitrites   negative, urobilinogen 0.2, bilirubin negative, blood negative, ketones   40, glucose negative, protein 100, pH 5.5. Specific gravity 1.027. WBC   0-4, RBCs 0-5, bacteria negative. CT abdomen and pelvis without   contrast. Results are reviewed and noted in HPI. Chest x-ray portable,   no acute cardiopulmonary process. ASSESSMENT AND PLAN     · Acute nausea and vomiting and abdominal pain    likely from uremia and now improved , surgery on board and patient has hernia without strangulation , needs outpatient follow up , he has started eating solids now   · ADAM and dehydration : improved with iv fluids . · Leukocytosis  /SIRS , improved , no source of infection so far , urine and cxr is clear . Likely from dehydration , finish 5 day empiric Levaquin course . · Metabolic encephalopathy , probably back to baseline , not sure dementia or not ? Needs neuro psych evaluation outpatient . · DM  , on SSI , accuchecks   · Liver masses , incidental hypo densities on CT , unfortunately MRI was done without contrast and now inconclusive ,  MRI repeated with contrast with family request and was showing hemangiomas prelim report on discharge   · Meningioma  , CT shows sphenoidal mass and patient has history of meningioma , CD of MRI of brain sent to radiology to read and upload in our system , was compared by the radiologist    · Hypertensive urgency on admission , etiology unknown but now stable   · Constipation, started on miralax            DISCHARGE DIAGNOSES / PLAN:      Segment 8 lesion cannot be further characterized on this exam secondary to poor  imaging resolution related to a combination of phase encoding artifact and  respiratory motion. Limited evaluation of the segment 7 lesion suggests a  cavernous hemangioma. Definitive characterization could likely be obtained with  CT of the abdomen with and without contrast, as the temporal resolution is much  better than MRI.     Please follow up Dr Alec Patel for further management of hepatic masses and hernia   Patient and family informed of same on discharge        PENDING TEST RESULTS:   At the time of discharge the following test results are still pending:     FOLLOW UP APPOINTMENTS:    Follow-up Information     Follow up With Details Comments Contact Info    None   None (395) Patient stated that they have no PCP      None   None (395) Patient stated that they have no PCP             ADDITIONAL CARE RECOMMENDATIONS:     Please follow up Dr Alec Patel for further management of hepatic masses and hernia   Patient and family informed of same on discharge     DIET: Regular Diet  Oral Nutritional Supplements    ACTIVITY: Activity as tolerated    WOUND CARE:     EQUIPMENT needed:       DISCHARGE MEDICATIONS:  Discharge Medication List as of 10/14/2017  1:02 PM      START taking these medications    Details   levoFLOXacin (LEVAQUIN) 250 mg tablet Take 1 Tab by mouth daily for 4 days. , Print, Disp-4 Tab, R-0         CONTINUE these medications which have NOT CHANGED    Details   lisinopril (PRINIVIL, ZESTRIL) 5 mg tablet Take 5 mg by mouth daily. , Historical Med      ibuprofen (MOTRIN) 400 mg tablet Take 400 mg by mouth every eight (8) hours as needed for Pain., Historical Med      therapeutic multivitamin (THERAGRAN) tablet Take 1 Tab by mouth daily. , Historical Med               NOTIFY YOUR PHYSICIAN FOR ANY OF THE FOLLOWING:   Fever over 101 degrees for 24 hours. Chest pain, shortness of breath, fever, chills, nausea, vomiting, diarrhea, change in mentation, falling, weakness, bleeding. Severe pain or pain not relieved by medications. Or, any other signs or symptoms that you may have questions about.     DISPOSITION:  xx  Home With:   OT  PT  HH  RN       Long term SNF/Inpatient Rehab    Independent/assisted living    Hospice    Other:       PATIENT CONDITION AT DISCHARGE:     Functional status    Poor    x Deconditioned     Independent      Cognition     Lucid    x Forgetful     Dementia      Catheters/lines (plus indication)    Granados     PICC     PEG    x None      Code status   x  Full code     DNR      PHYSICAL EXAMINATION AT DISCHARGE:   Refer to Progress Note  Visit Vitals    /80 (BP 1 Location: Right arm, BP Patient Position: At rest)    Pulse 77    Temp 99.7 °F (37.6 °C)    Resp 18    Ht 5' 11\" (1.803 m)    Wt 83.9 kg (185 lb)    SpO2 95%    BMI 25.8 kg/m2     cvss 1s2   Chest is clear   abd is soft and nontender   Legs no edema     CHRONIC MEDICAL DIAGNOSES:  Problem List as of 10/14/2017  Date Reviewed: 10/12/2017          Codes Class Noted - Resolved    Leukocytosis ICD-10-CM: U76.547  ICD-9-CM: 288.60  10/12/2017 - Present        Generalized abdominal pain ICD-10-CM: R10.84  ICD-9-CM: 789.07  10/12/2017 - Present        * (Principal)AD[facility]acute [facility]) [facility] ICD-10-CM: N17.9  ICD-9-CM: 584.9 10/12/2017 - Present              Greater than 20 minutes were spent with the patient on counseling and coordination of care    Signed:   Delisa West MD  10/20/2017  1:50 PM

## 2018-05-26 ENCOUNTER — HOSPITAL ENCOUNTER (INPATIENT)
Age: 78
LOS: 1 days | Discharge: HOME HEALTH | DRG: 149 | End: 2018-05-28
Attending: STUDENT IN AN ORGANIZED HEALTH CARE EDUCATION/TRAINING PROGRAM | Admitting: INTERNAL MEDICINE
Payer: MEDICARE

## 2018-05-26 ENCOUNTER — APPOINTMENT (OUTPATIENT)
Dept: MRI IMAGING | Age: 78
DRG: 149 | End: 2018-05-26
Attending: FAMILY MEDICINE
Payer: MEDICARE

## 2018-05-26 ENCOUNTER — APPOINTMENT (OUTPATIENT)
Dept: CT IMAGING | Age: 78
DRG: 149 | End: 2018-05-26
Attending: STUDENT IN AN ORGANIZED HEALTH CARE EDUCATION/TRAINING PROGRAM
Payer: MEDICARE

## 2018-05-26 DIAGNOSIS — H81.10 BENIGN PAROXYSMAL POSITIONAL VERTIGO, UNSPECIFIED LATERALITY: ICD-10-CM

## 2018-05-26 DIAGNOSIS — R42 DIZZINESS: Primary | ICD-10-CM

## 2018-05-26 PROBLEM — G45.9 TIA (TRANSIENT ISCHEMIC ATTACK): Status: ACTIVE | Noted: 2018-05-26

## 2018-05-26 LAB
ALBUMIN SERPL-MCNC: 3.7 G/DL (ref 3.5–5)
ALBUMIN/GLOB SERPL: 1.1 {RATIO} (ref 1.1–2.2)
ALP SERPL-CCNC: 89 U/L (ref 45–117)
ALT SERPL-CCNC: 21 U/L (ref 12–78)
ANION GAP SERPL CALC-SCNC: 9 MMOL/L (ref 5–15)
AST SERPL-CCNC: 15 U/L (ref 15–37)
BASOPHILS # BLD: 0.1 K/UL (ref 0–0.1)
BASOPHILS NFR BLD: 1 % (ref 0–1)
BILIRUB SERPL-MCNC: 0.4 MG/DL (ref 0.2–1)
BUN SERPL-MCNC: 29 MG/DL (ref 6–20)
BUN/CREAT SERPL: 20 (ref 12–20)
CALCIUM SERPL-MCNC: 9 MG/DL (ref 8.5–10.1)
CHLORIDE SERPL-SCNC: 109 MMOL/L (ref 97–108)
CO2 SERPL-SCNC: 21 MMOL/L (ref 21–32)
CREAT SERPL-MCNC: 1.44 MG/DL (ref 0.7–1.3)
DIFFERENTIAL METHOD BLD: ABNORMAL
EOSINOPHIL # BLD: 0.3 K/UL (ref 0–0.4)
EOSINOPHIL NFR BLD: 3 % (ref 0–7)
ERYTHROCYTE [DISTWIDTH] IN BLOOD BY AUTOMATED COUNT: 14.1 % (ref 11.5–14.5)
EST. AVERAGE GLUCOSE BLD GHB EST-MCNC: 163 MG/DL
GLOBULIN SER CALC-MCNC: 3.4 G/DL (ref 2–4)
GLUCOSE BLD STRIP.AUTO-MCNC: 137 MG/DL (ref 65–100)
GLUCOSE BLD STRIP.AUTO-MCNC: 192 MG/DL (ref 65–100)
GLUCOSE SERPL-MCNC: 156 MG/DL (ref 65–100)
HBA1C MFR BLD: 7.3 % (ref 4.2–6.3)
HCT VFR BLD AUTO: 43.3 % (ref 36.6–50.3)
HGB BLD-MCNC: 14.3 G/DL (ref 12.1–17)
IMM GRANULOCYTES # BLD: 0.1 K/UL (ref 0–0.04)
IMM GRANULOCYTES NFR BLD AUTO: 1 % (ref 0–0.5)
INR BLD: 1.1 (ref 0.9–1.2)
LYMPHOCYTES # BLD: 1.9 K/UL (ref 0.8–3.5)
LYMPHOCYTES NFR BLD: 17 % (ref 12–49)
MAGNESIUM SERPL-MCNC: 2.1 MG/DL (ref 1.6–2.4)
MCH RBC QN AUTO: 28.9 PG (ref 26–34)
MCHC RBC AUTO-ENTMCNC: 33 G/DL (ref 30–36.5)
MCV RBC AUTO: 87.7 FL (ref 80–99)
MONOCYTES # BLD: 0.9 K/UL (ref 0–1)
MONOCYTES NFR BLD: 8 % (ref 5–13)
NEUTS SEG # BLD: 7.9 K/UL (ref 1.8–8)
NEUTS SEG NFR BLD: 71 % (ref 32–75)
NRBC # BLD: 0 K/UL (ref 0–0.01)
NRBC BLD-RTO: 0 PER 100 WBC
PLATELET # BLD AUTO: 285 K/UL (ref 150–400)
PMV BLD AUTO: 11.8 FL (ref 8.9–12.9)
POTASSIUM SERPL-SCNC: 4.5 MMOL/L (ref 3.5–5.1)
PROT SERPL-MCNC: 7.1 G/DL (ref 6.4–8.2)
RBC # BLD AUTO: 4.94 M/UL (ref 4.1–5.7)
SERVICE CMNT-IMP: ABNORMAL
SERVICE CMNT-IMP: ABNORMAL
SODIUM SERPL-SCNC: 139 MMOL/L (ref 136–145)
TSH SERPL DL<=0.05 MIU/L-ACNC: 2.49 UIU/ML (ref 0.36–3.74)
WBC # BLD AUTO: 11.1 K/UL (ref 4.1–11.1)

## 2018-05-26 PROCEDURE — 96360 HYDRATION IV INFUSION INIT: CPT

## 2018-05-26 PROCEDURE — 85610 PROTHROMBIN TIME: CPT

## 2018-05-26 PROCEDURE — 99285 EMERGENCY DEPT VISIT HI MDM: CPT

## 2018-05-26 PROCEDURE — 80053 COMPREHEN METABOLIC PANEL: CPT | Performed by: STUDENT IN AN ORGANIZED HEALTH CARE EDUCATION/TRAINING PROGRAM

## 2018-05-26 PROCEDURE — 74011250636 HC RX REV CODE- 250/636: Performed by: FAMILY MEDICINE

## 2018-05-26 PROCEDURE — 96361 HYDRATE IV INFUSION ADD-ON: CPT

## 2018-05-26 PROCEDURE — 74011250636 HC RX REV CODE- 250/636: Performed by: STUDENT IN AN ORGANIZED HEALTH CARE EDUCATION/TRAINING PROGRAM

## 2018-05-26 PROCEDURE — 85025 COMPLETE CBC W/AUTO DIFF WBC: CPT | Performed by: STUDENT IN AN ORGANIZED HEALTH CARE EDUCATION/TRAINING PROGRAM

## 2018-05-26 PROCEDURE — 83735 ASSAY OF MAGNESIUM: CPT | Performed by: FAMILY MEDICINE

## 2018-05-26 PROCEDURE — 99218 HC RM OBSERVATION: CPT

## 2018-05-26 PROCEDURE — 82962 GLUCOSE BLOOD TEST: CPT

## 2018-05-26 PROCEDURE — 93005 ELECTROCARDIOGRAM TRACING: CPT

## 2018-05-26 PROCEDURE — A9575 INJ GADOTERATE MEGLUMI 0.1ML: HCPCS | Performed by: STUDENT IN AN ORGANIZED HEALTH CARE EDUCATION/TRAINING PROGRAM

## 2018-05-26 PROCEDURE — 70553 MRI BRAIN STEM W/O & W/DYE: CPT

## 2018-05-26 PROCEDURE — 84443 ASSAY THYROID STIM HORMONE: CPT | Performed by: FAMILY MEDICINE

## 2018-05-26 PROCEDURE — 70450 CT HEAD/BRAIN W/O DYE: CPT

## 2018-05-26 PROCEDURE — 93880 EXTRACRANIAL BILAT STUDY: CPT

## 2018-05-26 PROCEDURE — 74011250637 HC RX REV CODE- 250/637: Performed by: FAMILY MEDICINE

## 2018-05-26 PROCEDURE — 83036 HEMOGLOBIN GLYCOSYLATED A1C: CPT | Performed by: FAMILY MEDICINE

## 2018-05-26 RX ORDER — MECLIZINE HCL 12.5 MG 12.5 MG/1
25 TABLET ORAL
Status: COMPLETED | OUTPATIENT
Start: 2018-05-26 | End: 2018-05-26

## 2018-05-26 RX ORDER — BISMUTH SUBSALICYLATE 262 MG
1 TABLET,CHEWABLE ORAL DAILY
COMMUNITY
End: 2019-12-21

## 2018-05-26 RX ORDER — SODIUM CHLORIDE 0.9 % (FLUSH) 0.9 %
5-10 SYRINGE (ML) INJECTION AS NEEDED
Status: DISCONTINUED | OUTPATIENT
Start: 2018-05-26 | End: 2018-05-28 | Stop reason: HOSPADM

## 2018-05-26 RX ORDER — ACETAMINOPHEN 650 MG/1
650 SUPPOSITORY RECTAL
Status: DISCONTINUED | OUTPATIENT
Start: 2018-05-26 | End: 2018-05-28 | Stop reason: HOSPADM

## 2018-05-26 RX ORDER — GUAIFENESIN 100 MG/5ML
81 LIQUID (ML) ORAL DAILY
Status: DISCONTINUED | OUTPATIENT
Start: 2018-05-27 | End: 2018-05-28 | Stop reason: HOSPADM

## 2018-05-26 RX ORDER — GADOTERATE MEGLUMINE 376.9 MG/ML
20 INJECTION INTRAVENOUS
Status: COMPLETED | OUTPATIENT
Start: 2018-05-26 | End: 2018-05-26

## 2018-05-26 RX ORDER — ADHESIVE BANDAGE
30 BANDAGE TOPICAL DAILY PRN
Status: DISCONTINUED | OUTPATIENT
Start: 2018-05-26 | End: 2018-05-28 | Stop reason: HOSPADM

## 2018-05-26 RX ORDER — ACETAMINOPHEN 325 MG/1
650 TABLET ORAL
Status: DISCONTINUED | OUTPATIENT
Start: 2018-05-26 | End: 2018-05-28 | Stop reason: HOSPADM

## 2018-05-26 RX ORDER — SODIUM CHLORIDE 0.9 % (FLUSH) 0.9 %
5-10 SYRINGE (ML) INJECTION EVERY 8 HOURS
Status: DISCONTINUED | OUTPATIENT
Start: 2018-05-26 | End: 2018-05-28 | Stop reason: HOSPADM

## 2018-05-26 RX ORDER — FAMOTIDINE 20 MG/1
20 TABLET, FILM COATED ORAL EVERY 12 HOURS
Status: DISCONTINUED | OUTPATIENT
Start: 2018-05-26 | End: 2018-05-27

## 2018-05-26 RX ORDER — SODIUM CHLORIDE 0.9 % (FLUSH) 0.9 %
10 SYRINGE (ML) INJECTION
Status: COMPLETED | OUTPATIENT
Start: 2018-05-26 | End: 2018-05-26

## 2018-05-26 RX ORDER — LISINOPRIL 5 MG/1
5 TABLET ORAL DAILY
Status: DISCONTINUED | OUTPATIENT
Start: 2018-05-27 | End: 2018-05-28 | Stop reason: HOSPADM

## 2018-05-26 RX ORDER — SODIUM CHLORIDE 9 MG/ML
75 INJECTION, SOLUTION INTRAVENOUS CONTINUOUS
Status: DISPENSED | OUTPATIENT
Start: 2018-05-26 | End: 2018-05-27

## 2018-05-26 RX ORDER — ACETAMINOPHEN 500 MG
500 TABLET ORAL
COMMUNITY
End: 2019-12-21

## 2018-05-26 RX ORDER — MECLIZINE HCL 12.5 MG 12.5 MG/1
25 TABLET ORAL ONCE
Status: COMPLETED | OUTPATIENT
Start: 2018-05-27 | End: 2018-05-27

## 2018-05-26 RX ORDER — ENOXAPARIN SODIUM 100 MG/ML
40 INJECTION SUBCUTANEOUS EVERY 24 HOURS
Status: DISCONTINUED | OUTPATIENT
Start: 2018-05-26 | End: 2018-05-28 | Stop reason: HOSPADM

## 2018-05-26 RX ADMIN — SODIUM CHLORIDE 1000 ML: 900 INJECTION, SOLUTION INTRAVENOUS at 10:21

## 2018-05-26 RX ADMIN — MECLIZINE 25 MG: 12.5 TABLET ORAL at 10:21

## 2018-05-26 RX ADMIN — GADOTERATE MEGLUMINE 20 ML: 376.9 INJECTION INTRAVENOUS at 15:40

## 2018-05-26 RX ADMIN — FAMOTIDINE 20 MG: 20 TABLET ORAL at 16:48

## 2018-05-26 RX ADMIN — SODIUM CHLORIDE 75 ML/HR: 900 INJECTION, SOLUTION INTRAVENOUS at 16:31

## 2018-05-26 RX ADMIN — ENOXAPARIN SODIUM 40 MG: 100 INJECTION SUBCUTANEOUS at 16:48

## 2018-05-26 RX ADMIN — Medication 10 ML: at 15:40

## 2018-05-26 RX ADMIN — Medication 10 ML: at 21:15

## 2018-05-26 NOTE — PROGRESS NOTES
Admission Medication Reconciliation:    Information obtained from:  Patient, patient's     Comments: Updated PTA meds/reviewed patient's allergies. Patient was alert during interview, takes lisinopril  for HTN. Per , patient did not take lisinopril this morning. Significant PMH/Disease States:   Past Medical History:   Diagnosis Date    Diabetes ([facility]     diet controlled    Hypertension     Meningioma ([facility]     Vertigo        Chief Complaint for this Admission:    Chief Complaint   Patient presents with    Dizziness    Hypertension       Allergies:  Codeine; Contrast dye [iodine]; and Pcn [penicillins]    Prior to Admission Medications:   Prior to Admission Medications   Prescriptions Last Dose Informant Patient Reported? Taking?   acetaminophen (TYLENOL EXTRA STRENGTH) 500 mg tablet 5/19/2018 at Unknown time  Yes Yes   Sig: Take 500 mg by mouth every six (6) hours as needed for Pain. lisinopril (PRINIVIL, ZESTRIL) 5 mg tablet 5/25/2018 at Unknown time  Yes Yes   Sig: Take 5 mg by mouth daily. multivitamin (ONE A DAY) tablet   Yes Yes   Sig: Take 1 Tab by mouth daily.       Facility-Administered Medications: None

## 2018-05-26 NOTE — ROUTINE PROCESS
TRANSFER - OUT REPORT:    Verbal report given to Vanessa Khan (name) on Bushra Blanco  being transferred to [place] [id number] (unit) for routine progression of care       Report consisted of patients Situation, Background, Assessment and   Recommendations(SBAR). Information from the following report(s) SBAR, Kardex, ED Summary and MAR was reviewed with the receiving nurse. Lines:   Peripheral IV 05/26/18 Left Arm (Active)   Site Assessment Clean, dry, & intact 5/26/2018 12:25 PM   Phlebitis Assessment 0 5/26/2018 12:25 PM   Infiltration Assessment 0 5/26/2018 12:25 PM   Dressing Status Clean, dry, & intact 5/26/2018 12:25 PM   Dressing Type Transparent 5/26/2018 12:25 PM   Hub Color/Line Status Pink 5/26/2018 12:25 PM        Opportunity for questions and clarification was provided.       Patient transported with:   [facility]

## 2018-05-26 NOTE — ED NOTES
Bedside shift change report given to Troy Tatum and Oj Rn (oncoming nurse) by Muna Garcia RN (offgoing nurse). Report included the following information SBAR, Kardex and MAR.

## 2018-05-26 NOTE — PROGRESS NOTES
Prior to Admission Medications   Prescriptions Last Dose Informant Patient Reported? Taking?   acetaminophen (TYLENOL EXTRA STRENGTH) 500 mg tablet 5/19/2018 at Unknown time  Yes Yes   Sig: Take 500 mg by mouth every six (6) hours as needed for Pain. lisinopril (PRINIVIL, ZESTRIL) 5 mg tablet 5/19/2018 at Unknown time  Yes Yes   Sig: Take 5 mg by mouth daily. Facility-Administered Medications: None   Self: List updated per patient interview. Tylenol added. Ibuprofen and multivitamin removed. Denies use of any other medications, supplements, drops, patches or creams.

## 2018-05-26 NOTE — PROCEDURES
[facility]  *** FINAL REPORT ***    Name: Elizabeth Herring  MRN: [id number]    Outpatient  : 15 Sep 1940  HIS Order #: [id number]  [id number] [facility] Visit #: [id number]  Date: 26 May 2018    TYPE OF TEST: Cerebrovascular Duplex    REASON FOR TEST  Dizziness/vertigo    Right Carotid:-             Proximal               Mid                 Distal  cm/s  Systolic  Diastolic  Systolic  Diastolic  Systolic  Diastolic  CCA:     39.4      11.0                            52.0      11.0  Bulb:  ECA:     69.0  ICA:     49.0      14.0       42.0      14.0       33.0      12.0  ICA/CCA:  0.9       1.3    ICA Stenosis:    Right Vertebral:-  Finding: Antegrade  Sys:       25.0  Julia:    Right Subclavian:    Left Carotid:-            Proximal                Mid                 Distal  cm/s  Systolic  Diastolic  Systolic  Diastolic  Systolic  Diastolic  CCA:     94.0      13.0                            55.0      10.0  Bulb:  ECA:     68.0  ICA:     40.0      13.0       24.0       5.0       46.0      14.0  ICA/CCA:  0.7       1.3    ICA Stenosis:    Left Vertebral:-  Finding: Antegrade  Sys:       43.0  Julia:    Left Subclavian:    INTERPRETATION/FINDINGS  PROCEDURE:  Color duplex ultrasound imaging of extracranial  cerebrovascular arteries. FINDINGS:       Right:  Internal carotid velocity is within normal limits. There   is narrowing of the internal carotid flow channel on color Doppler  imaging and non-calcific plaque on B-mode imaging, consistent with  less than 50 percent stenosis (lower portion of the 0 to 49 percent  range). The common and external carotid arteries are patent and  without evidence of hemodynamically significant stenosis. Left:  Internal carotid velocity is within normal limits, there  is no observed narrowing of the flow channel on color Doppler imaging,   and no plaque is visualized on B-mode imaging.   The common and  external carotid arteries are patent and without evidence of  hemodynamically significant stenosis. IMPRESSION:  Consistent with less than 50% stenosis (low end) of the  right internal carotid and no stenosis of the left internal carotid. Vertebrals are patent with antegrade flow. ADDITIONAL COMMENTS    I have personally reviewed the data relevant to the interpretation of  this  study.     TECHNOLOGIST: Lai Tena RVT  Signed: 05/26/2018 02:43 PM    PHYSICIAN: Anuja Roy MD  Signed: 05/26/2018 04:08 PM

## 2018-05-26 NOTE — PROGRESS NOTES
Bedside and Verbal shift change report given to Filiberto Snell RN (oncoming nurse) by Anjelica Dennis RN (offgoing nurse). Report included the following information SBAR, Kardex, MAR, Recent Results and Cardiac Rhythm NSR.

## 2018-05-26 NOTE — ED PROVIDER NOTES
HPI Comments: 76 y.o. male with past medical history significant for HTN, diabetes (not on medication), meningioma, and vertigo who presents from home via EMS with chief complaint of dizziness. Patient woke up with sudden onset of dizziness today at 0630 while trying to get out of bed. He said the bed felt like it was moving so he \"slid\" off the bed onto the floor because he felt too dizzy to stand. He called his friend to come over and help him. The dizziness is not positional. He admits to mild nausea secondary to dizziness. His Patient notes a frontal headache for one day and recent neck stiffness. He also has sensory changes on the side of his left lower leg that he has not noticed until now. No recent illness but does note a stuffy nose. He is not compliant with his HTN meds and is not medicated for diabetes (friend says \"he hasn't been tested in a long time. \"). Patient is not currently dizzy. No sudden changes in hearing. No chest pain or vomiting. No LOC. There are no other acute medical concerns at this time. Social hx: Never smoker. No alcohol use. No illicit drug use. PCP: None    Note written by Yusra Rosa, as dictated by Kelin Kilgore MD 8:59 AM      The history is provided by the patient and a friend. Past Medical History:   Diagnosis Date    Diabetes ([facility]     diet controlled    Hypertension     Meningioma ([facility]     Vertigo        Past Surgical History:   Procedure Laterality Date    HX TONSILLECTOMY           History reviewed. No pertinent family history. Social History     Social History    Marital status:      Spouse name: N/A    Number of children: N/A    Years of education: N/A     Occupational History    Not on file.      Social History Main Topics    Smoking status: Never Smoker    Smokeless tobacco: Never Used    Alcohol use No    Drug use: Not on file    Sexual activity: Not on file     Other Topics Concern    Not on file     Social History Narrative         ALLERGIES: Codeine; Contrast dye [iodine]; and Pcn [penicillins]    Review of Systems   Constitutional: Negative for chills, diaphoresis, fatigue and fever. HENT: Negative for congestion, hearing loss, sinus pressure, sore throat, trouble swallowing and voice change. Eyes: Negative for photophobia and visual disturbance. Respiratory: Negative for cough, chest tightness and shortness of breath. Cardiovascular: Negative for chest pain, palpitations and leg swelling. Gastrointestinal: Negative for abdominal pain, blood in stool, constipation, diarrhea, nausea and vomiting. Musculoskeletal: Positive for neck stiffness. Negative for arthralgias, myalgias and neck pain. Neurological: Positive for dizziness and headaches. Negative for syncope, weakness and light-headedness. All other systems reviewed and are negative. Note written by Yusra Verdugo, as dictated by Rosa Rubin MD 8:59 AM    Vitals:    05/26/18 3814 05/26/18 0800   BP: (!) 161/95 138/87   Pulse: 81 76   Resp: 18 18   Temp: 98.1 °F (36.7 °C)    SpO2: 99% 95%            Physical Exam   Constitutional: He is oriented to person, place, and time. He appears well-developed and well-nourished. No distress. HENT:   Head: Normocephalic and atraumatic. Nose: Nose normal.   Mouth/Throat: Oropharynx is clear and moist. No oropharyngeal exudate. Decrease hearing in right ear compared to left ear. Eyes: Conjunctivae are normal. Right eye exhibits no discharge. Left eye exhibits no discharge. No scleral icterus. Right eye exhibits no nystagmus. Left eye exhibits no nystagmus. No vertical or lateral nystagmus. Neck: Normal range of motion. Neck supple. No JVD present. No tracheal deviation present. No thyromegaly present. Cardiovascular: Normal rate, regular rhythm, normal heart sounds and intact distal pulses. Exam reveals no gallop and no friction rub. No murmur heard.   Pulmonary/Chest: Effort normal and breath sounds normal. No stridor. No respiratory distress. He has no wheezes. He has no rales. He exhibits no tenderness. Abdominal: Bowel sounds are normal. He exhibits no distension and no mass. There is no tenderness. There is no rebound. Musculoskeletal: Normal range of motion. He exhibits no edema or tenderness. Lymphadenopathy:     He has no cervical adenopathy. Neurological: He is alert and oriented to person, place, and time. No cranial nerve deficit. Coordination normal.   Decreased sensation lateral aspect of LLE. Reproducible dizziness with lateral head movement. Skin: Skin is warm and dry. No rash noted. He is not diaphoretic. No erythema. No pallor. Psychiatric: He has a normal mood and affect. His behavior is normal. Judgment and thought content normal.   Nursing note and vitals reviewed. Note written by Yusra Malik, as dictated by Francisco Rg MD 8:59 AM  MDM  Number of Diagnoses or Management Options  Dizziness:   Diagnosis management comments: BPPV, post circ CVA. 77 y/o male with sudden onset dizziness that does not seem to be positional.  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 PT with hx of meningioma. Plan:  CT code neuro head, cbc, cmp, ecg, teleneuro consult. Will require admission.            Amount and/or Complexity of Data Reviewed  Clinical lab tests: ordered and reviewed  Tests in the radiology section of CPT®: ordered and reviewed  Review and summarize past medical records: yes  Discuss the patient with other providers: yes    Risk of Complications, Morbidity, and/or Mortality  Presenting problems: moderate  Diagnostic procedures: moderate  Management options: moderate    Critical Care  Total time providing critical care: 30-74 minutes (Total critical care time spent exclusive of procedures:  35 min.)    Patient Progress  Patient progress: stable        ED Course       Procedures    CONSULT NOTE:  9:09 Sonia Metcalf MD spoke with Dr. Abraham Krueger for teleneuro. Discussed available diagnostic tests and clinical findings. Dr. Genet Lin will see the patient. CONSULT NOTE:  9:35 AM Seferino Olsen MD spoke with Dr. Genet Lin, Consult for teleneuro. Discussed available diagnostic tests and clinical findings. Dr. Genet Lin recommends admission for MRI. CONSULT NOTE:  10:10 AM Seferino Olsen MD spoke with Dr. Tc Solo, Consult for Hospitalist.  Discussed available diagnostic tests and clinical findings. Dr. Tc Solo will see and admit.

## 2018-05-26 NOTE — PROGRESS NOTES
Problem: Falls - Risk of  Goal: *Absence of Falls  Document Nash Fall Risk and appropriate interventions in the flowsheet.    Outcome: Progressing Towards Goal  Fall Risk Interventions:            Medication Interventions: Bed/chair exit alarm, Evaluate medications/consider consulting pharmacy, Patient to call before getting OOB, Teach patient to arise slowly, Assess postural VS orthostatic hypotension    Elimination Interventions: Call light in reach, Bed/chair exit alarm, Patient to call for help with toileting needs, Toileting schedule/hourly rounds, Urinal in reach

## 2018-05-26 NOTE — IP AVS SNAPSHOT
[address] [address] 
[phone] Patient: Marah Zamudio MRN: [id number] SSU:3[birth year]40[birth year]21 About your hospitalization You were admitted on:  May 26, 2018 You last received care in the:  [facility] 6S NEURO-SCI TELE You were discharged on:  May 28, 2018 Why you were hospitalized Your primary diagnosis was:  Dizziness And Giddiness Your diagnoses also included:  Benign Positional Vertigo, Tia (Transient Ischemic Attack) Follow-up Information Follow up With Details Comments Contact Info None   None (395) Patient stated that they have no PCP Discharge Orders None A check rima indicates which time of day the medication should be taken. My Medications CONTINUE taking these medications Instructions Each Dose to Equal  
 Morning Noon Evening Bedtime  
 lisinopril 5 mg tablet Commonly known as:  Laura Dowdy Your last dose was: Your next dose is: Take 5 mg by mouth daily. 5 mg  
    
   
   
   
  
 multivitamin tablet Commonly known as:  ONE A DAY Your last dose was: Your next dose is: Take 1 Tab by mouth daily. 1 Tab TYLENOL EXTRA STRENGTH 500 mg tablet Generic drug:  acetaminophen Your last dose was: Your next dose is: Take 500 mg by mouth every six (6) hours as needed for Pain. 500 mg Discharge Instructions Discharge Instructions PATIENT ID: Marah Zamudio MRN: [id number] YOB: 1940 DATE OF ADMISSION: 5/26/2018  7:25 AM   
DATE OF DISCHARGE: 5/28/2018 PRIMARY CARE PROVIDER: None ATTENDING PHYSICIAN: Lurdes Mackey MD 
DISCHARGING PROVIDER: Gabino Garza NP To contact this individual call [phone] and ask the  to page. If unavailable ask to be transferred the Adult Hospitalist Department. DISCHARGE DIAGNOSES Dizziness/ Vertigo CONSULTATIONS: IP CONSULT TO NEUROLOGY PROCEDURES/SURGERIES: * No surgery found * PENDING TEST RESULTS:  
At the time of discharge the following test results are still pending: none FOLLOW UP APPOINTMENTS:  
Follow-up Information Follow up With Details Comments Contact Info None   None (395) Patient stated that they have no PCP 
  
  
  
 
ADDITIONAL CARE RECOMMENDATIONS:  
 
1. Follow up with primary care physician in 7-14 days as needed or if patient worsens. 2. You should set up Echocardiogram outpatient with cardiologist in the next 2-4 weeks. Your primary care provider ( once established) can help refer you to cardiology for ECHO. DIET: Regular Diet ACTIVITY: Activity as tolerated DISCHARGE MEDICATIONS: 
 See Medication Reconciliation Form · It is important that you take the medication exactly as they are prescribed. · Keep your medication in the bottles provided by the pharmacist and keep a list of the medication names, dosages, and times to be taken in your wallet. · Do not take other medications without consulting your doctor. NOTIFY YOUR PHYSICIAN FOR ANY OF THE FOLLOWING:  
Fever over 101 degrees for 24 hours. Chest pain, shortness of breath, fever, chills, nausea, vomiting, diarrhea, change in mentation, falling, weakness, bleeding. Severe pain or pain not relieved by medications. Or, any other signs or symptoms that you may have questions about. DISPOSITION: 
 X Home With: none OT  PT  [facility]  RN  
  
 SNF/Inpatient Rehab/LTAC Independent/assisted living Hospice Other: CDMP Checked: Yes X PROBLEM LIST Updated: Yes X Signed:  
Parvez Avalos NP 
5/28/2018 2:45 PM 
 
 
 
  
  
  
Introducing [facility]!    
 [employer] introduces DriveFactor patient portal. Now you can access parts of your medical record, email your doctor's office, and request medication refills online. 1. In your internet browser, go to [email or web address] 2. Click on the First Time User? Click Here link in the Sign In box. You will see the New Member Sign Up page. 3. Enter your UXFLIP Access Code exactly as it appears below. You will not need to use this code after youve completed the sign-up process. If you do not sign up before the expiration date, you must request a new code. · UXFLIP Access Code: [email or web address] Expires: 8/24/2018  7:31 AM 
 
4. Enter the last four digits of your Social Security Number (xxxx) and Date of Birth (mm/dd/yyyy) as indicated and click Submit. You will be taken to the next sign-up page. 5. Create a UXFLIP ID. This will be your UXFLIP login ID and cannot be changed, so think of one that is secure and easy to remember. 6. Create a UXFLIP password. You can change your password at any time. 7. Enter your Password Reset Question and Answer. This can be used at a later time if you forget your password. 8. Enter your e-mail address. You will receive e-mail notification when new information is available in [address] 9. Click Sign Up. You can now view and download portions of your medical record. 10. Click the Download Summary menu link to download a portable copy of your medical information. If you have questions, please visit the Frequently Asked Questions section of the UXFLIP website. Remember, UXFLIP is NOT to be used for urgent needs. For medical emergencies, dial 911. Now available from your iPhone and Android! Introducing Anuj Segura As a [facility] patient, I wanted to make you aware of our electronic visit tool called Anuj Segura. [facility] 24/7 allows you to connect within minutes with a medical provider 24 hours a day, seven days a week via a mobile device or tablet or logging into a secure website from your computer. You can access [facility] from anywhere in the [place]. A virtual visit might be right for you when you have a simple condition and feel like you just dont want to get out of bed, or cant get away from work for an appointment, when your regular [facility] provider is not available (evenings, weekends or holidays), or when youre out of town and need minor care. Electronic visits cost only $49 and if the [facility] 24/7 provider determines a prescription is needed to treat your condition, one can be electronically transmitted to a nearby pharmacy*. Please take a moment to enroll today if you have not already done so. The enrollment process is free and takes just a few minutes. To enroll, please download the Vega FoodFan/Fanzy marita to your tablet or phone, or visit [email or web address] to enroll on your computer. And, as an [address] [place] [address] patient with a Sensorist account, the results of your visits will be scanned into your electronic medical record and your primary care provider will be able to view the scanned results. We urge you to continue to see your regular [facility] provider for your ongoing medical care. And while your primary care provider may not be the one available when you seek a Anuj LucidMediaroberfin virtual visit, the peace of mind you get from getting a real diagnosis real time can be priceless. For more information on Redu.[email or web address], view our Frequently Asked Questions (FAQs) at [email or web address]. Sincerely, 
 
Joyce Gong MD 
Chief Medical Officer [address] [place] [address] *:  certain medications cannot be prescribed via Redu.usroberfin Providers Seen During Your Hospitalization Provider Specialty Primary office phone Jesus Parrish MD Emergency Medicine [phone] Benjamin Soares MD [facility] [phone] Chava Metz MD Hospitalist [phone] Your Primary Care Physician (PCP) Primary Care Physician Office Phone Office Fax NONE ** None ** ** None ** You are allergic to the following Allergen Reactions Codeine Vertigo Contrast Dye (Iodine) Unknown (comments) Pcn (Penicillins) Hives Recent Documentation Weight BMI Smoking Status 84 kg 25.83 kg/m2 Never Smoker Emergency Contacts Name Discharge Info Relation Home Work Mobile Rosaline Eason DISCHARGE CAREGIVER [3] Daughter [21] [phone] Patient Belongings The following personal items are in your possession at time of discharge: 
  Dental Appliances: None  Visual Aid: None      Home Medications: None   Jewelry: Watch, With patient, Ring  Clothing: Pants    Other Valuables: Cell Phone, Eyeglasses Please provide this summary of care documentation to your next provider. Signatures-by signing, you are acknowledging that this After Visit Summary has been reviewed with you and you have received a copy. Patient Signature:  ____________________________________________________________ Date:  ____________________________________________________________  
  
Beth Webster Provider Signature:  ____________________________________________________________ Date:  ____________________________________________________________

## 2018-05-26 NOTE — ED TRIAGE NOTES
Triage: Patient arrives via EMS from home with c/o dizziness and high blood pressure beginning upon waking this morning. Reports feeling fatigue.

## 2018-05-26 NOTE — IP AVS SNAPSHOT
Summary of Care Report The Summary of Care report has been created to help improve care coordination. Users with access to Social Tools or [address] (Web-based application) may access additional patient information including the Discharge Summary. If you are not currently a [address] user and need more information, please call the number listed below in the [place] [address] section and ask to be connected with Medical Records. Facility Information Name Address Phone [address] [place] [address] [phone] Patient Information Patient Name Sex  Tammy Gerber ([id number]) Male 1940 Discharge Information Admitting Provider Service Area Unit Vannesa Deleon MD /  [address] [facility] / [phone] Discharge Provider Discharge Date/Time Discharge Disposition Destination (none) 2018 (Pending) AHR (none) Patient Language Language ENGLISH [13] Hospital Problems as of 2018  Reviewed: 10/12/2017  3:01 AM by Justine Foy MD  
  
  
  
 Class Noted - Resolved Last Modified POA Active Problems * (Principal)Dizziness and giddiness  2018 - Present 2018 by Anson Palmer MD Yes Entered by Anson Palmer MD  
  Benign positional vertigo  2018 - Present 2018 by Bubba Haider DO Unknown Entered by Anson Palmer MD  
  TIA (transient ischemic attack)  2018 - Present 2018 by Vannesa Deleon MD Unknown Entered by Vannesa Deleon MD  
  
Non-Hospital Problems as of 2018  Reviewed: 10/12/2017  3:01 AM by Justine Foy MD  
  
  
  
 Class Noted - Resolved Last Modified Active Problems   Leukocytosis  10/12/2017 - Present 10/12/2017 by Justine Foy MD  
  Entered by Justine Foy MD  
 Generalized abdominal pain  10/12/2017 - Present 10/12/2017 by Emile Cabrales MD  
  Entered by Emile Cabrales MD  
  ADAM (acute kidney injury) [facility])  10/12/2017 - Present 10/12/2017 by Emile Cabrales MD  
  Entered by Emile Cabrales MD  
  
You are allergic to the following Allergen Reactions Codeine Vertigo Contrast Dye (Iodine) Unknown (comments) Pcn (Penicillins) Hives Current Discharge Medication List  
  
CONTINUE these medications which have NOT CHANGED Dose & Instructions Dispensing Information Comments  
 lisinopril 5 mg tablet Commonly known as:  Tian Human Dose:  5 mg Take 5 mg by mouth daily. Refills:  0  
   
 multivitamin tablet Commonly known as:  ONE A DAY Dose:  1 Tab Take 1 Tab by mouth daily. Refills:  0  
   
 TYLENOL EXTRA STRENGTH 500 mg tablet Generic drug:  acetaminophen Dose:  500 mg Take 500 mg by mouth every six (6) hours as needed for Pain. Refills:  0 Follow-up Information Follow up With Details Comments Contact Info None   None (395) Patient stated that they have no PCP Discharge Instructions Discharge Instructions PATIENT ID: Awa Richmond MRN: [id number] YOB: 1940 DATE OF ADMISSION: 5/26/2018  7:25 AM   
DATE OF DISCHARGE: 5/28/2018 PRIMARY CARE PROVIDER: None ATTENDING PHYSICIAN: Dariusz Nelson MD 
DISCHARGING PROVIDER: Domingo Silverio NP To contact this individual call [phone] and ask the  to page. If unavailable ask to be transferred the Adult Hospitalist Department. DISCHARGE DIAGNOSES Dizziness/ Vertigo CONSULTATIONS: IP CONSULT TO NEUROLOGY PROCEDURES/SURGERIES: * No surgery found * PENDING TEST RESULTS:  
At the time of discharge the following test results are still pending: none FOLLOW UP APPOINTMENTS:  
Follow-up Information Follow up With Details Comments Contact Info None   None (395) Patient stated that they have no PCP 
  
  
  
 
ADDITIONAL CARE RECOMMENDATIONS:  
 
1. Follow up with primary care physician in 7-14 days as needed or if patient worsens. 2. You should set up Echocardiogram outpatient with cardiologist in the next 2-4 weeks. Your primary care provider ( once established) can help refer you to cardiology for ECHO. DIET: Regular Diet ACTIVITY: Activity as tolerated DISCHARGE MEDICATIONS: 
 See Medication Reconciliation Form · It is important that you take the medication exactly as they are prescribed. · Keep your medication in the bottles provided by the pharmacist and keep a list of the medication names, dosages, and times to be taken in your wallet. · Do not take other medications without consulting your doctor. NOTIFY YOUR PHYSICIAN FOR ANY OF THE FOLLOWING:  
Fever over 101 degrees for 24 hours. Chest pain, shortness of breath, fever, chills, nausea, vomiting, diarrhea, change in mentation, falling, weakness, bleeding. Severe pain or pain not relieved by medications. Or, any other signs or symptoms that you may have questions about. DISPOSITION: 
 X Home With: none OT  PT  [place]  RN  
  
 SNF/Inpatient Rehab/LTAC Independent/assisted living Hospice Other: CDMP Checked: Yes X PROBLEM LIST Updated: Yes X Signed:  
Jermaine Santiago NP 
5/28/2018 2:45 PM 
 
 
 
Chart Review Routing History No Routing History on File

## 2018-05-26 NOTE — IP AVS SNAPSHOT
[address] [facility] [address] [place] [address] 
[phone] Patient: Debbie Murillo MRN: [id number] WXD:[birth year] A check rima indicates which time of day the medication should be taken. My Medications CONTINUE taking these medications Instructions Each Dose to Equal  
 Morning Noon Evening Bedtime  
 lisinopril 5 mg tablet Commonly known as:  Tiffany Antony Your last dose was: Your next dose is: Take 5 mg by mouth daily. 5 mg  
    
   
   
   
  
 multivitamin tablet Commonly known as:  ONE A DAY Your last dose was: Your next dose is: Take 1 Tab by mouth daily. 1 Tab TYLENOL EXTRA STRENGTH 500 mg tablet Generic drug:  acetaminophen Your last dose was: Your next dose is: Take 500 mg by mouth every six (6) hours as needed for Pain.   
 500 mg

## 2018-05-26 NOTE — H&P
Mary Anne Ha MD  Please call  and page for questions. Call physician on-call through the  7pm-7am      History & Physical    Primary Care Provider: None  Source of Information: Patient, his medical record and his daughter at the bedside. History of Presenting Illness:   Jimmy Baca is a 76 y.o. male with past medical history of HTN, diabetes (not on medication), meningioma, and vertigo who presents from home via EMS with chief complaint of dizziness. At 06:30 AM today he woke up with sudden onset of dizziness today while trying to get out of bed. He was too dizzy to stand so he \"slid\" off the bed onto the floor. Dizziness is not positional, it was associated with mild nausea. He had some numbness at the left lower leg. He has been having frontal headache for one day and recent neck stiffness. He has not been compliant with his HTN meds and is not medicated for diabetes. When I saw the patient at the ED he was feeling fine. He wanted to go home after MRI. Patient daughter and I dicussed with the patient for observation today. The patient denies any fever, chills, chest pain, cough, congestion, recent illness, palpitations, or dysuria. Patient has ongoing urinary incontinence. No sudden changes in hearing or vision problems. No LOC. No abdominal pain, nausea and vomiting. Review of Systems:  A comprehensive review of systems was negative except for that written in the History of Present Illness. Past Medical History:   Diagnosis Date    Diabetes ([address]     diet controlled    Hypertension     Meningioma ([facility] [address]     Vertigo       Past Surgical History:   Procedure Laterality Date    HX TONSILLECTOMY       Prior to Admission medications    Medication Sig Start Date End Date Taking? Authorizing Provider   acetaminophen (TYLENOL EXTRA STRENGTH) 500 mg tablet Take 500 mg by mouth every six (6) hours as needed for Pain.    Yes Historical Provider lisinopril (PRINIVIL, ZESTRIL) 5 mg tablet Take 5 mg by mouth daily. Yes Historical Provider     Allergies   Allergen Reactions    Codeine Vertigo    Contrast Dye [Iodine] Unknown (comments)    Pcn [Penicillins] Hives      History reviewed. No pertinent family history. SOCIAL HISTORY:  Patient resides:  Independently x   Assisted Living    SNF    With family care       Smoking history:   None x   Former    Chronic      Alcohol history:   None x   Social    Chronic      Ambulates:   Independently x   w/cane    w/walker    w/wc    CODE STATUS:  DNR    Full x   Other      Objective:     Physical Exam:     Visit Vitals    BP (!) 89/10    Pulse 81    Temp 98.1 °F (36.7 °C)    Resp 18    SpO2 93%      O2 Device: Room air    General:  Alert, cooperative, no distress, appears stated age. Head:  Normocephalic, without obvious abnormality. Eyes:  Conjunctivae/corneas clear. PERRL, EOMs intact. Neck: Supple, symmetrical, trachea midline, no adenopathy, thyroid: no enlargement/tenderness/nodules, no carotid bruit and no JVD. Back:   Symmetric, no curvature. ROM normal. No CVA tenderness. Lungs:   Clear to auscultation bilaterally. Heart:  Regular rate and rhythm, S1, S2 normal, no murmur. Abdomen:   Soft, non-tender. Bowel sounds normal.    Extremities: Extremities normal, atraumatic, no cyanosis or edema. Pulses: 2+ and symmetric all extremities. Skin: Skin color, texture, turgor normal. No rashes or lesions   Neurologic: CNII-XII intact. EKG:  normal EKG, normal sinus rhythm.       Data Review:     Recent Days:  Recent Labs      05/26/18   0738   WBC  11.1   HGB  14.3   HCT  43.3   PLT  285     Recent Labs      05/26/18   0904  05/26/18   0738   NA   --   139   K   --   4.5   CL   --   109*   CO2   --   21   GLU   --   156*   BUN   --   29*   CREA   --   1.44*   CA   --   9.0   ALB   --   3.7   SGOT   --   15   ALT   --   21   INR  1.1   --      No results for input(s): PH, PCO2, PO2, HCO3, FIO2 in the last 72 hours. 24 Hour Results:  Recent Results (from the past 24 hour(s))   CBC WITH AUTOMATED DIFF    Collection Time: 05/26/18  7:38 AM   Result Value Ref Range    WBC 11.1 4.1 - 11.1 K/uL    RBC 4.94 4.10 - 5.70 M/uL    HGB 14.3 12.1 - 17.0 g/dL    HCT 43.3 36.6 - 50.3 %    MCV 87.7 80.0 - 99.0 FL    MCH 28.9 26.0 - 34.0 PG    MCHC 33.0 30.0 - 36.5 g/dL    RDW 14.1 11.5 - 14.5 %    PLATELET 362 251 - 262 K/uL    MPV 11.8 8.9 - 12.9 FL    NRBC 0.0 0  WBC    ABSOLUTE NRBC 0.00 0.00 - 0.01 K/uL    NEUTROPHILS 71 32 - 75 %    LYMPHOCYTES 17 12 - 49 %    MONOCYTES 8 5 - 13 %    EOSINOPHILS 3 0 - 7 %    BASOPHILS 1 0 - 1 %    IMMATURE GRANULOCYTES 1 (H) 0.0 - 0.5 %    ABS. NEUTROPHILS 7.9 1.8 - 8.0 K/UL    ABS. LYMPHOCYTES 1.9 0.8 - 3.5 K/UL    ABS. MONOCYTES 0.9 0.0 - 1.0 K/UL    ABS. EOSINOPHILS 0.3 0.0 - 0.4 K/UL    ABS. BASOPHILS 0.1 0.0 - 0.1 K/UL    ABS. IMM. GRANS. 0.1 (H) 0.00 - 0.04 K/UL    DF AUTOMATED     METABOLIC PANEL, COMPREHENSIVE    Collection Time: 05/26/18  7:38 AM   Result Value Ref Range    Sodium 139 136 - 145 mmol/L    Potassium 4.5 3.5 - 5.1 mmol/L    Chloride 109 (H) 97 - 108 mmol/L    CO2 21 21 - 32 mmol/L    Anion gap 9 5 - 15 mmol/L    Glucose 156 (H) 65 - 100 mg/dL    BUN 29 (H) 6 - 20 MG/DL    Creatinine 1.44 (H) 0.70 - 1.30 MG/DL    BUN/Creatinine ratio 20 12 - 20      GFR est AA 57 (L) >60 ml/min/1.73m2    GFR est non-AA 47 (L) >60 ml/min/1.73m2    Calcium 9.0 8.5 - 10.1 MG/DL    Bilirubin, total 0.4 0.2 - 1.0 MG/DL    ALT (SGPT) 21 12 - 78 U/L    AST (SGOT) 15 15 - 37 U/L    Alk.  phosphatase 89 45 - 117 U/L    Protein, total 7.1 6.4 - 8.2 g/dL    Albumin 3.7 3.5 - 5.0 g/dL    Globulin 3.4 2.0 - 4.0 g/dL    A-G Ratio 1.1 1.1 - 2.2     EKG, 12 LEAD, INITIAL    Collection Time: 05/26/18  7:38 AM   Result Value Ref Range    Ventricular Rate 76 BPM    Atrial Rate 76 BPM    P-R Interval 168 ms    QRS Duration 82 ms    Q-T Interval 380 ms    QTC Calculation (Bezet) 427 ms    Calculated P Axis 25 degrees    Calculated R Axis -30 degrees    Calculated T Axis 3 degrees    Diagnosis       Normal sinus rhythm  Left axis deviation  When compared with ECG of 12-OCT-2017 05:50,  Criteria for Septal infarct are no longer present  Nonspecific T wave abnormality no longer evident in Anterior leads     POC INR    Collection Time: 05/26/18  9:04 AM   Result Value Ref Range    INR (POC) 1.1 <1.2           Imaging:     Assessment and Plan:     Dizziness:  Resolved now as per patient and he would like to go home. Patient is agreeable to stay for observation and for work up for possible CVA. Will get MRI, TTE, carotid doppler. Initial CT head as negative. Will get neurology, PT/OT consult. Will start with ASA. May need statin as well. Follow Lipid panel, TSH and electrolytes. Will observe in neuro floor. Diabetes mellitus:   Patient has been managing this with diet and exercise. His last A1c was 7.0, will recheck. Hypertension:   BP is reasonable. Will continue his home medicine. Chronic renal disease: Creatinine stable. See orders for other plans:   VTE prophylaxis: Lovenox  Code status: Full  Discussed plan of care with Patient/Family and Nurse   Pre-admission lived at home:   Discharge planning: pending.            Signed By: Nando Fam MD     May 26, 2018

## 2018-05-27 PROBLEM — H81.10 BENIGN POSITIONAL VERTIGO: Status: ACTIVE | Noted: 2018-05-26

## 2018-05-27 LAB
ATRIAL RATE: 76 BPM
CALCULATED P AXIS, ECG09: 25 DEGREES
CALCULATED R AXIS, ECG10: -30 DEGREES
CALCULATED T AXIS, ECG11: 3 DEGREES
DIAGNOSIS, 93000: NORMAL
GLUCOSE BLD STRIP.AUTO-MCNC: 131 MG/DL (ref 65–100)
GLUCOSE BLD STRIP.AUTO-MCNC: 141 MG/DL (ref 65–100)
GLUCOSE BLD STRIP.AUTO-MCNC: 147 MG/DL (ref 65–100)
GLUCOSE BLD STRIP.AUTO-MCNC: 319 MG/DL (ref 65–100)
P-R INTERVAL, ECG05: 168 MS
Q-T INTERVAL, ECG07: 380 MS
QRS DURATION, ECG06: 82 MS
QTC CALCULATION (BEZET), ECG08: 427 MS
SERVICE CMNT-IMP: ABNORMAL
VENTRICULAR RATE, ECG03: 76 BPM

## 2018-05-27 PROCEDURE — G8988 SELF CARE GOAL STATUS: HCPCS

## 2018-05-27 PROCEDURE — 97535 SELF CARE MNGMENT TRAINING: CPT

## 2018-05-27 PROCEDURE — G8978 MOBILITY CURRENT STATUS: HCPCS

## 2018-05-27 PROCEDURE — 74011250636 HC RX REV CODE- 250/636: Performed by: INTERNAL MEDICINE

## 2018-05-27 PROCEDURE — 74011250637 HC RX REV CODE- 250/637: Performed by: FAMILY MEDICINE

## 2018-05-27 PROCEDURE — G8987 SELF CARE CURRENT STATUS: HCPCS

## 2018-05-27 PROCEDURE — 97165 OT EVAL LOW COMPLEX 30 MIN: CPT

## 2018-05-27 PROCEDURE — 97161 PT EVAL LOW COMPLEX 20 MIN: CPT

## 2018-05-27 PROCEDURE — 97530 THERAPEUTIC ACTIVITIES: CPT

## 2018-05-27 PROCEDURE — 82962 GLUCOSE BLOOD TEST: CPT

## 2018-05-27 PROCEDURE — 99218 HC RM OBSERVATION: CPT

## 2018-05-27 PROCEDURE — 97116 GAIT TRAINING THERAPY: CPT

## 2018-05-27 PROCEDURE — 74011250636 HC RX REV CODE- 250/636: Performed by: NURSE PRACTITIONER

## 2018-05-27 PROCEDURE — G8979 MOBILITY GOAL STATUS: HCPCS

## 2018-05-27 PROCEDURE — 74011250636 HC RX REV CODE- 250/636: Performed by: FAMILY MEDICINE

## 2018-05-27 RX ORDER — FAMOTIDINE 20 MG/1
20 TABLET, FILM COATED ORAL DAILY
Status: DISCONTINUED | OUTPATIENT
Start: 2018-05-28 | End: 2018-05-28 | Stop reason: HOSPADM

## 2018-05-27 RX ORDER — MECLIZINE HCL 12.5 MG 12.5 MG/1
25 TABLET ORAL
Status: DISCONTINUED | OUTPATIENT
Start: 2018-05-27 | End: 2018-05-28 | Stop reason: HOSPADM

## 2018-05-27 RX ADMIN — LISINOPRIL 5 MG: 5 TABLET ORAL at 10:12

## 2018-05-27 RX ADMIN — MECLIZINE 25 MG: 12.5 TABLET ORAL at 12:00

## 2018-05-27 RX ADMIN — Medication 10 ML: at 14:00

## 2018-05-27 RX ADMIN — ASPIRIN 81 MG CHEWABLE TABLET 81 MG: 81 TABLET CHEWABLE at 10:12

## 2018-05-27 RX ADMIN — ENOXAPARIN SODIUM 40 MG: 100 INJECTION SUBCUTANEOUS at 14:19

## 2018-05-27 RX ADMIN — MECLIZINE 25 MG: 12.5 TABLET ORAL at 20:32

## 2018-05-27 RX ADMIN — MECLIZINE 25 MG: 12.5 TABLET ORAL at 00:05

## 2018-05-27 NOTE — PROGRESS NOTES
0730 Bedside and Verbal shift change report given to Tamiko Louis (oncoming nurse) by Zofia Gunderson (offgoing nurse). Report included the following information SBAR, Kardex, Intake/Output, MAR and Cardiac Rhythm NSR.

## 2018-05-27 NOTE — PROGRESS NOTES
Problem: Self Care Deficits Care Plan (Adult)  Goal: *Acute Goals and Plan of Care (Insert Text)  Occupational Therapy Goals  Initiated 5/27/2018  1. Patient will perform grooming standing at sink with modified independence within 7 day(s). 2.  Patient will perform lower body dressing with modified independence within 7 day(s). 3.  Patient will perform bathing with modified independence within 7 day(s). 4.  Patient will perform toilet transfers with modified independence within 7 day(s). 5.  Patient will perform all aspects of toileting with modified independence within 7 day(s). 6.  Patient will participate in upper extremity therapeutic exercise/activities with independence for 10 minutes within 7 day(s). 7.  Patient will utilize energy conservation techniques during functional activities with verbal cues within 7 day(s). Occupational Therapy EVALUATION  Patient: Albin Gaviria (89 y.o. male)  Date: 5/27/2018  Primary Diagnosis: TIA (transient ischemic attack)        Precautions: Fall       ASSESSMENT :  Based on the objective data described below, the patient presents with impaired dynamic sitting balance, impaired static and dynamic standing balance, fall risk, impaired functional mobility, decreased safety awareness, decreased understanding for need for assistance, and impaired ADL independence s/p admission for dizziness/ possible TIA. Patient received supine in bed, alert and agreeable to therapy, expressing desire to discharge home today. Patient reports dizziness with initial bed mobility but no dizziness in remainder of evaluation. CT/ MRI negative for acute CVA, demonstrates intact BUE AROM/ strength/ coordination WFL, Fugl Aviles not indicated at this time. Receptive to [employer] education on signs/ symptoms of CVA. Required mod-A for supine-sit (with HOB flat and rail lowered), and occasional support to maintain static/ dynamic sitting balance EOB. Patient uses a rollator at home.   This therapist attempted education on increased stability of RW, although patient not receptive, repeatedly blaming RW on mobility/ balance deficits. Performed sit-stand with min-A, ambulated to bathroom using RW with min-A for steadying assistance, toilet transfer with min-A, bowel hygiene standing using grab bar with min-A for steadying, grooming standing at sink with min-A steadying and poor RW management despite verbal cues. Patient with brief around thighs after bed mobility and toileting, requiring verbal cues to raise to decrease fall risk. Per chart review, patient slid out of bed to the floor yesterday in hospital.   Patient reports he lives alone although does have neighbors/ friends who can check on him. Patient is currently not safe to discharge home alone due to physical assistance required for ADLs/ mobility, impaired safety awareness, and fall risk. Recommend rehab vs home health pending progress. Patient will benefit from skilled intervention to address the above impairments.   Patients rehabilitation potential is considered to be Good  Factors which may influence rehabilitation potential include:   [x]             None noted  []             Mental ability/status  []             Medical condition  []             Home/family situation and support systems  []             Safety awareness  []             Pain tolerance/management  []             Other:      PLAN :  Recommendations and Planned Interventions:  [x]               Self Care Training                  [x]        Therapeutic Activities  [x]               Functional Mobility Training    []        Cognitive Retraining  [x]               Therapeutic Exercises           [x]        Endurance Activities  [x]               Balance Training                   []        Neuromuscular Re-Education  []               Visual/Perceptual Training     [x]   Home Safety Training  [x]               Patient Education                 [x]        Family Training/Education  []               Other (comment):    Frequency/Duration: Patient will be followed by occupational therapy 5 times a week to address goals. Discharge Recommendations:rehab vs home health pending progress  Further Equipment Recommendations for Discharge: TBD     SUBJECTIVE:   Patient stated Just leave the head of the bed up. That will help my cause to get out of here. \"    OBJECTIVE DATA SUMMARY:   HISTORY:   Past Medical History:   Diagnosis Date    Diabetes ([facility] [address]     diet controlled    Hypertension     Meningioma ([facility] [address]     Vertigo      Past Surgical History:   Procedure Laterality Date    HX TONSILLECTOMY         Prior Level of Function/Environment/Context: lives alone in apartment in [facility], reports mod-I with ADLs and IADLs, has friends/ neighbors who can check on him  Expanded or extensive additional review of patient history:     Home Situation  Home Environment: Apartment  # Steps to Enter: 0  One/Two Story Residence: One story  Living Alone: Yes  Support Systems: Friends \ neighbors  Patient Expects to be Discharged to[de-identified] Apartment  Current DME Used/Available at Home: danielle Luu  []  Right hand dominant   []  Left hand dominant    EXAMINATION OF PERFORMANCE DEFICITS:  Cognitive/Behavioral Status:  Neurologic State: Alert  Orientation Level: Oriented X4  Cognition: Follows commands;Decreased attention/concentration;Poor safety awareness  Perception: Appears intact  Perseveration: No perseveration noted  Safety/Judgement: Decreased awareness of need for assistance;Decreased awareness of need for safety    Skin: visible skin appears intact    Edema: none noted    Hearing:   Auditory  Auditory Impairment: None    Vision/Perceptual:    Tracking: Able to track stimulus in all quadrants w/o difficulty              Visual Fields:  (able to detect stimuli in all 4 quadrants)  Diplopia: No    Acuity: Within Defined Limits;Able to read clock/calendar on wall without difficulty; Able to read employee name badge without difficulty    Corrective Lenses: Glasses    Range of Motion:    AROM: Within functional limits (BUE)  PROM: Within functional limits (BUE)                      Strength:    Strength: Within functional limits (BUE)                Coordination:  Coordination: Within functional limits (BUE)  Fine Motor Skills-Upper: Left Intact; Right Intact    Gross Motor Skills-Upper: Left Intact; Right Intact    Tone & Sensation:    Tone: Normal  Sensation: Intact                      Balance:  Sitting: Impaired  Sitting - Static: Fair (occasional)  Sitting - Dynamic: Fair (occasional)  Standing: Impaired  Standing - Static: Fair;Constant support  Standing - Dynamic : Fair    Functional Mobility and Transfers for ADLs:  Bed Mobility:  Supine to Sit: Moderate assistance (for trunk righting)    Transfers:  Sit to Stand: Minimum assistance  Stand to Sit: Contact guard assistance  Bed to Chair: Minimum assistance  Toilet Transfer : Minimum assistance (using grab bar)    ADL Assessment:  Feeding: Independent (inferred)    Oral Facial Hygiene/Grooming: Minimum assistance (washed hands at sink, min-A for steadying)    Bathing: Minimum assistance (inferred due to impaired dynamic sitting balance)    Upper Body Dressing: Contact guard assistance (donned gown sitting EOB)    Lower Body Dressing: Minimum assistance (steadying for raising/ lowering brief standing)    Toileting: Minimum assistance (steadying for raising/ lowering brief, bowel hygiene standing using grab bar)                ADL Intervention and task modifications:       Cognitive Retraining  Safety/Judgement: Decreased awareness of need for assistance;Decreased awareness of need for safety      Functional Measure:  Barthel Index:    Bathin  Bladder: 5  Bowels: 10  Groomin  Dressin  Feeding: 10  Mobility: 0  Stairs: 0  Toilet Use: 5  Transfer (Bed to Chair and Back): 10  Total: 45       Barthel and G-code impairment scale:  Percentage of impairment CH  0% CI  1-19% CJ  20-39% CK  40-59% CL  60-79% CM  80-99% CN  100%   Barthel Score 0-100 100 99-80 79-60 59-40 20-39 1-19   0   Barthel Score 0-20 20 17-19 13-16 9-12 5-8 1-4 0      The Barthel ADL Index: Guidelines  1. The index should be used as a record of what a patient does, not as a record of what a patient could do. 2. The main aim is to establish degree of independence from any help, physical or verbal, however minor and for whatever reason. 3. The need for supervision renders the patient not independent. 4. A patient's performance should be established using the best available evidence. Asking the patient, friends/relatives and nurses are the usual sources, but direct observation and common sense are also important. However direct testing is not needed. 5. Usually the patient's performance over the preceding 24-48 hours is important, but occasionally longer periods will be relevant. 6. Middle categories imply that the patient supplies over 50 per cent of the effort. 7. Use of aids to be independent is allowed. Yris Ozuna., Barthel, DNereydaW. ([id number]). Functional evaluation: the Barthel Index. [address] [facility] (14)2. ZAID Manzo, Nigel Candelario., Power Gunderson., [place], [address] [place] [address] (1999). Measuring the change indisability after inpatient rehabilitation; comparison of the responsiveness of the Barthel Index and Functional [place] Measure. Journal of Neurology, Neurosurgery, and Psychiatry, 66(4), 958-349. Ziggy Lincoln, CALLIE.TIAN.CHRISTINA, BAUDILIO Alcantara, & Dario Beckett, MTWYLA. (2004.) Assessment of post-stroke quality of life in cost-effectiveness studies: The usefulness of the Barthel Index and the EuroQoL-5D. Quality of Life Research, 13, 804-56         G codes: In compliance with CMSs Claims Based Outcome Reporting, the following G-code set was chosen for this patient based on their primary functional limitation being treated:     The outcome measure chosen to determine the severity of the functional limitation was the Barthel Index with a score of 45/100 which was correlated with the impairment scale. ? Self Care:     - CURRENT STATUS: CK - 40%-59% impaired, limited or restricted    - GOAL STATUS: CI - 1%-19% impaired, limited or restricted    - D/C STATUS:  ---------------To be determined---------------     Occupational Therapy Evaluation Charge Determination   History Examination Decision-Making   LOW Complexity : Brief history review  LOW Complexity : 1-3 performance deficits relating to physical, cognitive , or psychosocial skils that result in activity limitations and / or participation restrictions  LOW Complexity : No comorbidities that affect functional and no verbal or physical assistance needed to complete eval tasks       Based on the above components, the patient evaluation is determined to be of the following complexity level: LOW   Pain:  Pain Scale 1: Numeric (0 - 10)  Pain Intensity 1: 0              Activity Tolerance:   Vitals:    05/27/18 1007   BP: (!) 161/97   BP 1 Location: Right arm   BP Patient Position: Post activity; Sitting   Pulse: (!) 105     Please refer to the flowsheet for vital signs taken during this treatment. After treatment:   [x] Patient left in no apparent distress sitting up in chair  [] Patient left in no apparent distress in bed  [x] Call bell left within reach  [x] Nursing notified  [] Caregiver present  [x] Chair alarm activated    COMMUNICATION/EDUCATION:   The patients plan of care was discussed with: Registered Nurse. [x] Home safety education was provided and the patient/caregiver indicated understanding. [x] Patient/family have participated as able in goal setting and plan of care. [x] Patient/family agree to work toward stated goals and plan of care. [] Patient understands intent and goals of therapy, but is neutral about his/her participation.   [] Patient is unable to participate in goal setting and plan of care. This patients plan of care is appropriate for delegation to LELAND.     Thank you for this referral.  Merlyn Leung, OT  Time Calculation: 33 mins

## 2018-05-27 NOTE — PROGRESS NOTES
Problem: Falls - Risk of  Goal: *Absence of Falls  Document Nash Fall Risk and appropriate interventions in the flowsheet.    Fall Risk Interventions:  Mobility Interventions: Bed/chair exit alarm, Communicate number of staff needed for ambulation/transfer, PT Consult for mobility concerns, PT Consult for assist device competence, Strengthening exercises (ROM-active/passive), Utilize walker, cane, or other assitive device         Medication Interventions: Evaluate medications/consider consulting pharmacy, Patient to call before getting OOB, Teach patient to arise slowly, Assess postural VS orthostatic hypotension    Elimination Interventions: Call light in reach, Toileting schedule/hourly rounds

## 2018-05-27 NOTE — PROGRESS NOTES
Hospitalist Progress Note  Michael Reza NP  Answering service: [phone] from in house phone  Cell: [phone]      Date of Service:  2018  NAME:  Willy Jimenez  :  1940  MRN:  [id number]      Admission Summary:   Willy Jimenez is a 76 y.o. male with past medical history of HTN, diabetes (not on medication), meningioma, and vertigo who presents from home via EMS with chief complaint of dizziness. At 06:30 AM today he woke up with sudden onset of dizziness today while trying to get out of bed. He was too dizzy to stand so he \"slid\" off the bed onto the floor. Dizziness is not positional, it was associated with mild nausea. He had some numbness at the left lower leg. He has been having frontal headache for one day and recent neck stiffness. He has not been compliant with his HTN meds and is not medicated for diabetes. When I saw the patient at the ED he was feeling fine. He wanted to go home after MRI. Patient daughter and I dicussed with the patient for observation today. The patient denies any fever, chills, chest pain, cough, congestion, recent illness, palpitations, or dysuria. Patient has ongoing urinary incontinence. No sudden changes in hearing or vision problems. No LOC. No abdominal pain, nausea and vomiting.      Interval history / Subjective:   Pending echo and PT evaluation  Pt is very unsteady upon standing and falls back into bed, not safe for discharge at this point  Had extensive conversation at bedside with patient, girlfriend and daughter  Refuses lab draw, but will wait for echo and PT evaluation  OT recommending rehab     Assessment & Plan:     Dizziness r/t BPV  - CT head negative for acute findings, unchanged supra-sellar mass/meningioma  - mri brain w/o acute ischemia, noted unchanged meningioma  - CUS: patent vessels  - has done vestibular therapy in the past with sucess  - lipid panel not completed as patient refused labs in am  - TSH is normal  - no arrhythmia's on tele  - evaluated by neuro  - c/w asa    DM2   - diagnosed about 5 years ago, controlled with diet, does not take any medications  - A1c 7.3 (163)  - discussed and counseled on lifestyle changes     HTN - c/w lisinopril    CKD vs ADAM  - can/t really tell at this point if he has ckd, he does have underlying dm which could contribute to CKD, he will not allow labs to be redrawn, but he will follow up with a PCP that his daughter is setting up for him      Code status: Full  DVT prophylaxis: Lovenox  Care Plan discussed with: patient, girlfriend, daughter, Attending MD  Disposition: TBD     Hospital Problems  Date Reviewed: 10/12/2017          Codes Class Noted POA    * (Principal)Dizziness and giddiness ICD-10-CM: R42  ICD-9-CM: 780.4  5/26/2018 Yes        Benign positional vertigo ICD-10-CM: H81.10  ICD-9-CM: 386.11  5/26/2018 Unknown                Review of Systems:   Pertinent items are noted in HPI. Vital Signs:    Last 24hrs VS reviewed since prior progress note. Most recent are:  Visit Vitals    /87 (BP 1 Location: Right arm, BP Patient Position: Post activity)    Pulse (!) 101    Temp 97.9 °F (36.6 °C)    Resp 21    Wt 83 kg (182 lb 15.7 oz)    SpO2 98%    BMI 25.52 kg/m2         Intake/Output Summary (Last 24 hours) at 05/27/18 1427  Last data filed at 05/27/18 0113   Gross per 24 hour   Intake                0 ml   Output              230 ml   Net             -230 ml        Physical Examination:             Constitutional:  No acute distress, cooperative, pleasant    ENT:  Oral mucous moist, oropharynx benign. Neck supple   Resp:  CTA bilaterally. No wheezing/rhonchi/rales. No accessory muscle use   CV:  Regular rhythm, normal rate, no murmurs, gallops, rubs. NSR on tele    GI:  Soft, non distended, non tender.  normoactive bowel sounds, no hepatosplenomegaly     Musculoskeletal:  No edema, warm, 2+ pulses throughout Neurologic:  Moves all extremities. AAOx3, CN II-XII reviewed. Unsteady upon standing and falls back     Psych:  Good insight, Not anxious nor agitated. Skin:  Good turgor, no rashes or ulcers       Data Review:    Review and/or order of clinical lab test  Review and/or order of tests in the radiology section of CPT  Review and/or order of tests in the medicine section of [facility]      Labs:     Recent Labs      05/26/18   0738   WBC  11.1   HGB  14.3   HCT  43.3   PLT  285     Recent Labs      05/26/18   0738   NA  139   K  4.5   CL  109*   CO2  21   BUN  29*   CREA  1.44*   GLU  156*   CA  9.0   MG  2.1     Recent Labs      05/26/18   0738   SGOT  15   ALT  21   AP  89   TBILI  0.4   TP  7.1   ALB  3.7   GLOB  3.4     Recent Labs      05/26/18   0904   INR  1.1      No results for input(s): FE, TIBC, PSAT, FERR in the last 72 hours. No results found for: FOL, RBCF   No results for input(s): PH, PCO2, PO2 in the last 72 hours. No results for input(s): CPK, CKNDX, TROIQ in the last 72 hours.     No lab exists for component: CPKMB  No results found for: CHOL, CHOLX, CHLST, CHOLV, HDL, LDL, LDLC, DLDLP, TGLX, TRIGL, TRIGP, CHHD, CHHDX  Lab Results   Component Value Date/Time    Glucose (POC) 319 (H) 05/27/2018 12:04 PM    Glucose (POC) 141 (H) 05/27/2018 08:29 AM    Glucose (POC) 192 (H) 05/26/2018 09:46 PM    Glucose (POC) 137 (H) 05/26/2018 05:18 PM    Glucose (POC) 124 (H) 10/14/2017 11:48 AM     Lab Results   Component Value Date/Time    Color YELLOW/STRAW 10/11/2017 11:45 PM    Appearance CLEAR 10/11/2017 11:45 PM    Specific gravity 1.027 10/11/2017 11:45 PM    pH (UA) 5.5 10/11/2017 11:45 PM    Protein 100 (A) 10/11/2017 11:45 PM    Glucose NEGATIVE  10/11/2017 11:45 PM    Ketone 40 (A) 10/11/2017 11:45 PM    Bilirubin NEGATIVE  10/11/2017 11:45 PM    Urobilinogen 0.2 10/11/2017 11:45 PM    Nitrites NEGATIVE  10/11/2017 11:45 PM    Leukocyte Esterase TRACE (A) 10/11/2017 11:45 PM Epithelial cells FEW 10/11/2017 11:45 PM    Bacteria NEGATIVE  10/11/2017 11:45 PM    WBC 0-4 10/11/2017 11:45 PM    RBC 0-5 10/11/2017 11:45 PM         Medications Reviewed:     Current Facility-Administered Medications   Medication Dose Route Frequency    meclizine (ANTIVERT) tablet 25 mg  25 mg Oral TID PRN    [START ON 5/28/2018] famotidine (PEPCID) tablet 20 mg  20 mg Oral DAILY    lisinopril (PRINIVIL, ZESTRIL) tablet 5 mg  5 mg Oral DAILY    sodium chloride (NS) flush 5-10 mL  5-10 mL IntraVENous Q8H    sodium chloride (NS) flush 5-10 mL  5-10 mL IntraVENous PRN    acetaminophen (TYLENOL) tablet 650 mg  650 mg Oral Q4H PRN    Or    acetaminophen (TYLENOL) solution 650 mg  650 mg Per NG tube Q4H PRN    Or    acetaminophen (TYLENOL) suppository 650 mg  650 mg Rectal Q4H PRN    aspirin chewable tablet 81 mg  81 mg Oral DAILY    magnesium hydroxide (MILK OF MAGNESIA) 400 mg/5 mL oral suspension 30 mL  30 mL Oral DAILY PRN    enoxaparin (LOVENOX) injection 40 mg  40 mg SubCUTAneous Q24H     ______________________________________________________________________  EXPECTED LENGTH OF STAY: - - -  ACTUAL LENGTH OF STAY:          0                 Jing GARCIA NP

## 2018-05-27 NOTE — PROGRESS NOTES
Problem: Pressure Injury - Risk of  Goal: *Prevention of pressure injury  Document Delvin Scale and appropriate interventions in the flowsheet.    Outcome: Progressing Towards Goal  Pressure Injury Interventions:       Moisture Interventions: Absorbent underpads, Limit adult briefs, Maintain skin hydration (lotion/cream), Minimize layers    Activity Interventions: Increase time out of bed, Pressure redistribution bed/mattress(bed type), PT/OT evaluation    Mobility Interventions: Float heels, HOB 30 degrees or less, Pressure redistribution bed/mattress (bed type), PT/OT evaluation    Nutrition Interventions: Document food/fluid/supplement intake, Offer support with meals,snacks and hydration    Friction and Shear Interventions: Apply protective barrier, creams and emollients, Foam dressings/transparent film/skin sealants, HOB 30 degrees or less, Lift sheet, Transfer aides:transfer board/Jamie lift/ceiling lift, Transferring/repositioning devices

## 2018-05-27 NOTE — PROGRESS NOTES
Patient complaining of severe dizziness. Paged hospitalist to make aware. Dr. Yogesh Calderon gave order for one time dosage of 25mg antivert PO. Will continue to monitor and assess.

## 2018-05-27 NOTE — PROGRESS NOTES
05/27/18 1100   Vitals   Temp 97.9 °F (36.6 °C)   Temp Source Oral   Pulse (Heart Rate) (!) 101   Heart Rate Source Monitor   Resp Rate 21   O2 Sat (%) 98 %   Level of Consciousness Alert   /87   MAP (Calculated) 107   BP 1 Location Right arm   BP 1 Method Automatic   BP Patient Position Post activity   Cardiac Rhythm NSR   MEWS Score 3   Pain 1   Pain Scale 1 Numeric (0 - 10)   Pain Intensity 1 0   Patient Stated Pain Goal 0   Oxygen Therapy   O2 Device Room air   Patient Observation   Patient Turned Turns self   Observations returned to chair from bathroom   MEWS OF 3. Patient was ambulating from bathroom. No distress. Will continue to monitor.

## 2018-05-27 NOTE — PROGRESS NOTES
Problem: Mobility Impaired (Adult and Pediatric)  Goal: *Acute Goals and Plan of Care (Insert Text)  Physical Therapy Goals  Initiated 5/27/2018    1. Patient will perform sit to stand with modified independence within 7 days. 2. Patient will ambulate with modified independence for 200 feet with the least restrictive device with improved safety awareness and safe use of rollator within 7 days. physical Therapy EVALUATION  Patient: Elva Jones (73 y.o. male)  Date: 5/27/2018  Primary Diagnosis: TIA (transient ischemic attack)        Precautions:   Fall    ASSESSMENT :  Based on the objective data described below, the patient presents with decreased standing balance and unsteady gait requiring the use of an assistive device. Pt denies any episodes of dizziness during session. Pt reports he uses a rollator at baseline and declines using a rolling walker att this time. He reports he had a few falls in the last year however these occurred when he ambulated a short distance without the rollator. Pt was agreeable to PT intervention as he is eager to be discharged. He is a bit impulsive and anxious throughout session and not receptive to suggestions and gait training and explaining how he does this at home. He demonstrated decreased safety awareness throughout and pt was made aware of this and given suggestions for improved safety. Pt demonstrated modified independence with bed mobility. Suggested that he attempt to transfer OOB without using the bed rails and pt requested to use the rollator as this is how he transfers at home. Sit to stand with modified independence with both hands on the walker despite cues for correct hand placement. Pt ambulated with a rollator x 170 feet with CGA.   He ambulates with an accelerated pace with forward flexed posture and walking mostly on his left heel with his great toe lifted off the floor(not new per pt's daughter)  Pt given reminders to slow down, to stay closer to walker with an upright posture, and to keep both feet within the walker however pt with minimal follow through and not receptive to gait training. Pt is likely at his baseline level of function. Pt's daughter and friend present and stating this is close to his normal gait. Highly recommend home health PT versus outpatient PT for balance and gait training. He resides in [facility] and per his friend he can receive PT services at the apartment complex. Patient will benefit from skilled intervention to address the above impairments. Patients rehabilitation potential is considered to be Good  Factors which may influence rehabilitation potential include:   []         None noted  []         Mental ability/status  []         Medical condition  []         Home/family situation and support systems  [x]         Safety awareness  []         Pain tolerance/management  []         Other:      PLAN :  Recommendations and Planned Interventions:  [x]           Bed Mobility Training             []    Neuromuscular Re-Education  [x]           Transfer Training                   []    Orthotic/Prosthetic Training  [x]           Gait Training                         []    Modalities  []           Therapeutic Exercises           []    Edema Management/Control  []           Therapeutic Activities            [x]    Patient and Family Training/Education  []           Other (comment):    Frequency/Duration: Patient will be followed by physical therapy  5 times a week to address goals. Discharge Recommendations: Home Health  Further Equipment Recommendations for Discharge: rollator, has his own     SUBJECTIVE:   Patient stated I do not like that metal one(rolling walker).       OBJECTIVE DATA SUMMARY:   HISTORY:    Past Medical History:   Diagnosis Date    Diabetes ([address]     diet controlled    Hypertension     Meningioma ([address]     Vertigo      Past Surgical History:   Procedure Laterality Date    HX TONSILLECTOMY       Prior Level of Function/Home Situation: ambulates with a rollator, reports he has had occasional falls which occur when ambulating without the rollator  Personal factors and/or comorbidities impacting plan of care: lives alone, history of falls, prefers to use a rollator though demonstrated decreased safety awareness with flexed posture, not receptive to directions to improve quality of gait     Home Situation  Home Environment: Apartment ([facility])  # Steps to Enter: 0  One/Two Story Residence: One story  Living Alone: Yes  Support Systems: Friends \ neighbors, Family member(s)  Patient Expects to be Discharged to[de-identified] Private residence  Current DME Used/Available at Home: [employer], rollator, Cane, straight, Crutches, Walker, rolling  Tub or Shower Type: Shower    EXAMINATION/PRESENTATION/DECISION MAKING:   Critical Behavior:  Neurologic State: Alert, Appropriate for age  Orientation Level: Oriented X4  Cognition: Follows commands,  decreased safety awareness  Safety/Judgement: Decreased awareness of need for safety, Lack of insight into deficits, impulsive   Hearing: Auditory  Auditory Impairment: None  Skin:  intact    Range Of Motion:  AROM: Within functional limits           PROM: Within functional limits            Strength:    Strength: Within functional limits                    Tone & Sensation:   Tone: Normal              Sensation: Intact               Coordination:  Coordination: Within functional limits   Vision:   Tracking: Able to track stimulus in all quadrants w/o difficulty  Visual Fields:  (able to detect stimuli in all 4 quadrants)  Diplopia: No  Acuity: Within Defined Limits;Able to read clock/calendar on wall without difficulty; Able to read employee name badge without difficulty  Corrective Lenses: Glasses  Functional Mobility:  Bed Mobility:     Supine to Sit: Modified independent; Additional time, pt reports he uses his rollator to get OOB  Sit to Supine: Modified independent; Additional time  Scooting: Modified independent  Transfers:  Sit to Stand: Supervision  Stand to Sit: Supervision                Balance:   Sitting: Intact  Standing: Impaired  Standing - Static: Good  Standing - Dynamic : Fair  Ambulation/Gait Training:  Distance (ft): 170 Feet (ft)  Assistive Device: Gait belt;Walker, rollator  Ambulation - Level of Assistance: Contact guard assistance;Assist x1     Gait Description (WDL): Exceptions to WDL  Gait Abnormalities: Decreased step clearance (flexed posture, walks more on his heel of left foot with  great toe raised, steps outside of walker base when turning around), pt given cues for upright posture and stay within the walker base however minimal follow through requiring frequent reminders, not receptive to gait training, at one point telling therapist to stop pulling on the gait belt as that was making him unsteady        Base of Support: Widened     Speed/Valarie: Accelerated, given cues to slow down, pt with minimal follow through, not receptive to gait training         Stairs: pt does not have stairs to enter apartment     Functional Measure:  Tinetti test:    Sitting Balance: 1  Arises: 2  Attempts to Rise: 2  Immediate Standing Balance: 1  Standing Balance: 1  Nudged: 1  Eyes Closed: 0  Turn 360 Degrees - Continuous/Discontinuous: 1  Turn 360 Degrees - Steady/Unsteady: 0  Sitting Down: 2  Balance Score: 11  Indication of Gait: 1  R Step Length/Height: 1  L Step Length/Height: 1  R Foot Clearance: 1  L Foot Clearance: 1  Step Symmetry: 1  Step Continuity: 1  Path: 1  Trunk: 1  Walking Time: 0  Gait Score: 9  Total Score: 20       Tinetti Test and G-code impairment scale:  Percentage of Impairment CH    0%   CI    1-19% CJ    20-39% CK    40-59% CL    60-79% CM    80-99% CN     100%   Tinetti  Score 0-28 28 23-27 17-22 12-16 6-11 1-5 0       Tinetti Tool Score Risk of Falls  <19 = High Fall Risk  19-24 = Moderate Fall Risk  25-28 = Low Fall Risk  Tinetti ME. Performance-Oriented Assessment of Mobility Problems in Elderly Patients. [facility]; [id number]. (Scoring Description: PT Bulletin Feb. 10, 1993)    Older adults: Kacy Isael et al, 2009; n = 1000 [facility] elderly evaluated with ABC, BEL, ADL, and IADL)  · Mean BEL score for males aged 69-68 years = 26.21(3.40)  · Mean BEL score for females age 69-68 years = 25.16(4.30)  · Mean BEL score for males over 80 years = 23.29(6.02)  · Mean BEL score for females over 80 years = 17.20(8.32)       G codes: In compliance with CMSs Claims Based Outcome Reporting, the following G-code set was chosen for this patient based on their primary functional limitation being treated: The outcome measure chosen to determine the severity of the functional limitation was the Tinetti with a score of 20/28 which was correlated with the impairment scale. ? Mobility - Walking and Moving Around:     - CURRENT STATUS: CJ - 20%-39% impaired, limited or restricted    - GOAL STATUS: CI - 1%-19% impaired, limited or restricted    - D/C STATUS:  ---------------To be determined---------------      Physical Therapy Evaluation Charge Determination   History Examination Presentation Decision-Making   MEDIUM  Complexity : 1-2 comorbidities / personal factors will impact the outcome/ POC  LOW Complexity : 1-2 Standardized tests and measures addressing body structure, function, activity limitation and / or participation in recreation  LOW Complexity : Stable, uncomplicated  LOW Complexity : FOTO score of       Based on the above components, the patient evaluation is determined to be of the following complexity level: LOW     Pain:  Pain Scale 1: Numeric (0 - 10)  Pain Intensity 1: 0              Activity Tolerance:   Good tolerance, no c/o dizziness with mobility, BP elevated at rest and with sitting EOB   Please refer to the flowsheet for vital signs taken during this treatment.   Vitals:    05/27/18 1607 05/27/18 1610   BP: (!) 157/100 (!) 153/92   BP 1 Location: Left arm Left arm   BP Patient Position: Supine Sitting   Pulse: 90 97   Resp:     Temp:     SpO2:     Weight:       After treatment:   []         Patient left in no apparent distress sitting up in chair  [x]         Patient left in no apparent distress in bed  [x]         Call bell left within reach  [x]         Nursing notified  []         Caregiver present  []         Bed alarm activated    COMMUNICATION/EDUCATION:   The patients plan of care was discussed with: Registered Nurse. [x]         Fall prevention education was provided and the patient/caregiver indicated understanding. []         Patient/family have participated as able in goal setting and plan of care. [x]         Patient/family agree to work toward stated goals and plan of care. []         Patient understands intent and goals of therapy, but is neutral about his/her participation. []         Patient is unable to participate in goal setting and plan of care.     Thank you for this referral.  Aliyah Paul   Time Calculation: 23 mins

## 2018-05-27 NOTE — PROGRESS NOTES
Patient refusing morning labs. Patient education provided. Charge RN spoke with pt, still refusing. Will reassess readiness.

## 2018-05-27 NOTE — CONSULTS
NEUROLOGY CONSULT NOTE    Name Lane Rosa Age 76 y.o. MRN [id number]  1940     Consulting Physician: Benjamin Arnett MD      Chief Complaint:  Dizzy     Assessment:     Principal Problem:    Dizziness and giddiness (2018)    Active Problems:    Benign positional vertigo (2018)      76year old male with a h/o HTN, DM, meningioma, vertigo presenting with episodic dizziness/vertigo 18 which is improved since admission. Neurological examination is presently non-focal.   CTH reviewed and without acute process, unchanged supra-sellar mass/meningioma. MRI Brain WWO also completed and without acute ischemia, noted unchanged meningioma. CUS with patent large vessels b/l. Presentation is most c/w benign positional vertigo. He has responded well in the past to vestibular therapy and should f/u with ENT in this regard. Recommendations:   ENT F/U  Vestibular therapy  Ok for discharge from neuro perspective- he will need f/u with serial imaging to monitor his meningioma in 1 year. Thank you very much for this referral. I appreciate the opportunity to participate in this patient's care. History of Present Illness: This is a 76 y.o.   male, we were asked to see for dizziness. PMH notable for HTN, DM, meningioma, vertigo. He presents from home with sx of dizziness/vertigo whicy started 18. His dizziness is better since admission, although he is still experiencing episodes which are lasting < 5 minutes typically without associated N/V, ataxia, focal weakness, numbness, speech/vision deficits. He may experience a mild headache after some events. There was mention of patchy numbness to the distal LLE not apparent on examination. Exacerbating factors include positional change. Episodes are responsive to meclizine. He denies current weakness, numbness, aphasia, dysarthria, vision loss.   He endorses a h/o vertigo in the past resulting in admissions (this is his 3rd admission for the same). In the past, he has seen ENT and was prescribed vestibular therapy which improved sx. CTH reviewed and without acute process, unchanged supra-sellar mass/meningioma. MRI Brain WWO also completed and without acute ischemia, noted unchanged meningioma. CUS with patent large vessels b/l. He is anxious to go home today. Allergies   Allergen Reactions    Codeine Vertigo    Contrast Dye [Iodine] Unknown (comments)    Pcn [Penicillins] Hives        Prior to Admission medications    Medication Sig Start Date End Date Taking? Authorizing Provider   acetaminophen (TYLENOL EXTRA STRENGTH) 500 mg tablet Take 500 mg by mouth every six (6) hours as needed for Pain. Yes Historical Provider   multivitamin (ONE A DAY) tablet Take 1 Tab by mouth daily. Yes Phys Other, MD   lisinopril (PRINIVIL, ZESTRIL) 5 mg tablet Take 5 mg by mouth daily. Yes Historical Provider       Past Medical History:   Diagnosis Date    Diabetes ([address]     diet controlled    Hypertension     M[address]     Vertigo         Past Surgical History:   Procedure Laterality Date    HX TONSILLECTOMY          Social History   Substance Use Topics    Smoking status: Never Smoker    Smokeless tobacco: Never Used    Alcohol use No        History reviewed. No pertinent family history. Review of Systems:   Comprehensive review of systems performed and negative except for as listed above. Exam:     Visit Vitals    /87 (BP 1 Location: Right arm, BP Patient Position: Post activity)    Pulse (!) 101    Temp 97.9 °F (36.6 °C)    Resp 21    Wt 83 kg (182 lb 15.7 oz)    SpO2 98%    BMI 25.52 kg/m2        General: Well developed, well nourished. Patient in no apparent distress   Head: Normocephalic, atraumatic, anicteric sclera   Lungs:  Clear to auscultation bilaterally, No wheezes or rubs   Cardiac: Regular rate and rhythm with no murmurs. Abd: Bowel sounds were audible.  No tenderness on palpation   Ext: No pedal edema   Skin: No overt signs of rash     Neurological Exam:  Mental Status: Alert and oriented to person place and time   Speech: Fluent no aphasia or dysarthria. Cranial Nerves:   Intact visual fields. Facial sensation is normal. Facial movement is symmetric. Palate is midline. Normal sternocleidomastoid strength. Tongue is midline. Hearing is intact bilaterally. Eyes: PERRL, EOM's full, no nystagmus, no ptosis. Motor:  Full and symmetric strength of upper and lower proximal and distal muscles. Normal bulk and tone. Reflexes:   Deep tendon reflexes 1+/4 and symmetrical.  Plantar response is downgoing b/l. Sensory:   Symmetrically intact  with no perceived deficits modalities involving small or large fibers. Gait:  Gait is balanced with assistance of a walker       Cerebellar:  Finger to nose and heel over shin to knee was demonstrated competently. Neurovascular: No carotid bruits. Imaging  CT Results (maximum last 3): Results from [place] encounter on 05/26/18   CT CODE NEURO HEAD WO CONTRAST   Narrative EXAM:  CT CODE NEURO HEAD WO CONTRAST    INDICATION:   numbness    COMPARISON: October 12, 2017. CONTRAST:  None. TECHNIQUE: Unenhanced CT of the head was performed using 5 mm images. Brain and  bone windows were generated. CT dose reduction was achieved through use of a  standardized protocol tailored for this examination and automatic exposure  control for dose modulation. FINDINGS:  The ventricles and sulci are stable in size, shape and configuration and  midline. There is no significant white matter disease. There is no intracranial  hemorrhage, extra-axial collection, or midline shift. 3.4 x 3.1 x 1.8 cm  extra-axial suprasellar mass, extending into the anterior cranial fossa, appears  unchanged. The basilar cisterns are open. No acute infarct is identified. The  bone windows demonstrate no abnormalities.  The visualized portions of the  paranasal sinuses and mastoid air cells are clear. Impression IMPRESSION: No acute infarct or intracranial hemorrhage. Unchanged  suprasellar/anterior cranial fossa mass, presumably meningioma. MRI Results (maximum last 3): Results from [facility] encounter on 05/26/18   MRI BRAIN W WO CONT   Narrative EXAM:  MRI BRAIN W WO CONT    INDICATION:   Dizziness, diabetes, chronic hypertension. COMPARISON: MRI brain on 7/14/2016. CT head earlier today at 9:12 AM.    TECHNIQUE: Sagittal T1; coronal post contrast T1; axial T1, T2, FLAIR,  gradient-echo, diffusion weighted MRI of the brain before and following  uneventful intravenous administration of gadolinium 20 mL Dotarem  performed on  the 3 Rosa magnet. FINDINGS: No hydrocephalus. No mass effect or midline shift. No extra-axial  fluid collection. Minimal chronic microvascular ischemic disease for age. No  restricted diffusion to indicate acute infarct. Cribriform plate meningioma measures 4.1 x 3.8 x 1.8 cm and extends into the  sella turcica. Mass effect on the pituitary gland. No significant change. The major intracranial vascular flow-voids are patent. The midline structures,  including the cervicomedullary junction, are within normal limits. Impression IMPRESSION:    1. No acute infarct. Minimal chronic microvascular ischemic disease for age. 2. 4.1 x 3.8 x 1.8 cm meningioma is unchanged since 2016. Unchanged mass effect  on the frontal lobes and pituitary gland.           Lab Review  Lab Results   Component Value Date/Time    WBC 11.1 05/26/2018 07:38 AM    HCT 43.3 05/26/2018 07:38 AM    HGB 14.3 05/26/2018 07:38 AM    PLATELET 095 00/63/0746 07:38 AM     Lab Results   Component Value Date/Time    Sodium 139 05/26/2018 07:38 AM    Potassium 4.5 05/26/2018 07:38 AM    Chloride 109 (H) 05/26/2018 07:38 AM    CO2 21 05/26/2018 07:38 AM    Glucose 156 (H) 05/26/2018 07:38 AM    BUN 29 (H) 05/26/2018 07:38 AM Creatinine 1.44 (H) 05/26/2018 07:38 AM    Calcium 9.0 05/26/2018 07:38 AM     No components found for: TROPQUANT  No results found for: KWABENA    Signed:  Mary Jane Thomson, DO  5/27/2018  12:50 PM

## 2018-05-28 VITALS
BODY MASS INDEX: 25.83 KG/M2 | SYSTOLIC BLOOD PRESSURE: 139 MMHG | OXYGEN SATURATION: 98 % | WEIGHT: 185.19 LBS | HEART RATE: 80 BPM | TEMPERATURE: 98.2 F | RESPIRATION RATE: 20 BRPM | DIASTOLIC BLOOD PRESSURE: 87 MMHG

## 2018-05-28 PROBLEM — G45.9 TIA (TRANSIENT ISCHEMIC ATTACK): Status: ACTIVE | Noted: 2018-05-28

## 2018-05-28 LAB
GLUCOSE BLD STRIP.AUTO-MCNC: 142 MG/DL (ref 65–100)
GLUCOSE BLD STRIP.AUTO-MCNC: 199 MG/DL (ref 65–100)
GLUCOSE BLD STRIP.AUTO-MCNC: 209 MG/DL (ref 65–100)
SERVICE CMNT-IMP: ABNORMAL

## 2018-05-28 PROCEDURE — 74011250636 HC RX REV CODE- 250/636: Performed by: FAMILY MEDICINE

## 2018-05-28 PROCEDURE — 74011250637 HC RX REV CODE- 250/637: Performed by: PSYCHIATRY & NEUROLOGY

## 2018-05-28 PROCEDURE — 65660000000 HC RM CCU STEPDOWN

## 2018-05-28 PROCEDURE — 99218 HC RM OBSERVATION: CPT

## 2018-05-28 PROCEDURE — 92610 EVALUATE SWALLOWING FUNCTION: CPT | Performed by: SPEECH-LANGUAGE PATHOLOGIST

## 2018-05-28 PROCEDURE — 82962 GLUCOSE BLOOD TEST: CPT

## 2018-05-28 PROCEDURE — 74011250637 HC RX REV CODE- 250/637: Performed by: FAMILY MEDICINE

## 2018-05-28 PROCEDURE — G8997 SWALLOW GOAL STATUS: HCPCS | Performed by: SPEECH-LANGUAGE PATHOLOGIST

## 2018-05-28 PROCEDURE — 74011250636 HC RX REV CODE- 250/636: Performed by: NURSE PRACTITIONER

## 2018-05-28 PROCEDURE — G8998 SWALLOW D/C STATUS: HCPCS | Performed by: SPEECH-LANGUAGE PATHOLOGIST

## 2018-05-28 PROCEDURE — G8996 SWALLOW CURRENT STATUS: HCPCS | Performed by: SPEECH-LANGUAGE PATHOLOGIST

## 2018-05-28 RX ADMIN — LISINOPRIL 5 MG: 5 TABLET ORAL at 09:27

## 2018-05-28 RX ADMIN — ENOXAPARIN SODIUM 40 MG: 100 INJECTION SUBCUTANEOUS at 15:00

## 2018-05-28 RX ADMIN — ASPIRIN 81 MG CHEWABLE TABLET 81 MG: 81 TABLET CHEWABLE at 09:26

## 2018-05-28 RX ADMIN — FAMOTIDINE 20 MG: 20 TABLET ORAL at 09:27

## 2018-05-28 RX ADMIN — MECLIZINE 25 MG: 12.5 TABLET ORAL at 14:44

## 2018-05-28 RX ADMIN — Medication 10 ML: at 14:00

## 2018-05-28 NOTE — PROGRESS NOTES
Problem: Falls - Risk of  Goal: *Absence of Falls  Document Nash Fall Risk and appropriate interventions in the flowsheet. Outcome: Progressing Towards Goal  Fall Risk Interventions:  Mobility Interventions: Bed/chair exit alarm, Assess mobility with egress test, Patient to call before getting OOB    Mentation Interventions: Bed/chair exit alarm    Medication Interventions: Patient to call before getting OOB    Elimination Interventions: Call light in reach, Bed/chair exit alarm    History of Falls Interventions: Bed/chair exit alarm, Door open when patient unattended        Problem: Pressure Injury - Risk of  Goal: *Prevention of pressure injury  Document Delvin Scale and appropriate interventions in the flowsheet.    Outcome: Progressing Towards Goal  Pressure Injury Interventions:       Moisture Interventions: Limit adult briefs, Minimize layers, Maintain skin hydration (lotion/cream), Offer toileting Q_hr, Moisture barrier    Activity Interventions: PT/OT evaluation, Pressure redistribution bed/mattress(bed type), Increase time out of bed    Mobility Interventions: Float heels, HOB 30 degrees or less, Pressure redistribution bed/mattress (bed type), PT/OT evaluation    Nutrition Interventions: Document food/fluid/supplement intake    Friction and Shear Interventions: Lift sheet, Minimize layers, Apply protective barrier, creams and emollients

## 2018-05-28 NOTE — PROGRESS NOTES
0730 Bedside shift change report given to [facility] (oncoming nurse) by Gui Hutchison (offgoing nurse).  Report included the following information SBAR, Kardex, Intake/Output, MAR and Cardiac Rhythm NSr.

## 2018-05-28 NOTE — PROGRESS NOTES
Bedside and Verbal shift change report given to Pushpa Anguiano RN (oncoming nurse) by Sruthi Garza RN (offgoing nurse). Report included the following information SBAR, Kardex, Intake/Output, MAR, Recent Results and Cardiac Rhythm NSR.

## 2018-05-28 NOTE — PROGRESS NOTES
Speech Pathology bedside swallow evaluation/discharge  Patient: Melody Baumgarten (46 y.o. male)  Date: 5/28/2018  Primary Diagnosis: TIA (transient ischemic attack)        Precautions:   Fall    ASSESSMENT :  Based on the objective data described below, the patient presents with no oral or pharyngeal dysphagia. Timely and complete mastication, timely swallow initiation and functional hyolaryngeal elevation/excursion via palpation. No s/s of aspiration observed. Patient with rapid rate of intake which he reports is baseline. No concerns regarding speech or language function at this time. Skilled therapy provided by a speech-language pathologist is not indicated at this time. PLAN :  Recommendations:  --regular diet. No further SLP needs   Discharge Recommendations: None     SUBJECTIVE:   Patient stated if you could run out to [employer] and get me some real coffee.  When asked if he needed anything else     OBJECTIVE:     Past Medical History:   Diagnosis Date    Diabetes ([facility] [address]     diet controlled    Hypertension     Meningioma ([facility] [address]     Vertigo      Past Surgical History:   Procedure Laterality Date    HX TONSILLECTOMY       Prior Level of Function/Home Situation:   Home Situation  Home Environment: Apartment ([facility])  # Steps to Enter: 0  One/Two Story Residence: One story  Living Alone: Yes  Support Systems: Friends \ neighbors, Family member(s)  Patient Expects to be Discharged to[de-identified] Private residence  Current DME Used/Available at Home: Ali Andres, rollator, Cane, straight, Crutches, Walker, rolling  Tub or Shower Type: Shower  Diet prior to admission: regular   Current Diet:  Regular    Cognitive and Communication Status:  Neurologic State: Alert, Appropriate for age  Orientation Level: Oriented X4  Cognition: Appropriate decision making, Appropriate for age attention/concentration, Appropriate safety awareness  Perception: Appears intact  Perseveration: No perseveration noted  Safety/Judgement: Awareness of environment  Oral Assessment:  Oral Assessment  Labial: No impairment  Dentition: Natural  Oral Hygiene: moist mucosa   Lingual: No impairment  Mandible: No impairment  P.O. Trials:  Patient Position: upright in bed   Vocal quality prior to P.O.: No impairment  Consistency Presented: Thin liquid; Solid (breakfast tray )  How Presented: Self-fed/presented;Cup/sip; Successive swallows     Bolus Acceptance: No impairment  Bolus Formation/Control: No impairment     Propulsion: No impairment  Oral Residue: None  Initiation of Swallow: No impairment  Laryngeal Elevation: Functional  Aspiration Signs/Symptoms: None  Pharyngeal Phase Characteristics: No impairment, issues, or problems         Comments: patient with rapid intake, reports baseline     Oral Phase Severity: No impairment  Pharyngeal Phase Severity : No impairment      G Codes: In compliance with CMSs Claims Based Outcome Reporting, the following G-code set was chosen for this patient based the use of the NOMS functional outcome to quantify this patient's level of swallowing impairment. Using the NOMS, the patient was determined to be at level 7 for swallow function which correlates with the CH= 0% level of severity. Based on the objective assessment provided within this note, the current, goal, and discharge g-codes are as follows:    Swallow  Swallowing:   Swallow Current Status CH= 0%   Swallow Goal Status CH= 0%   Swallow D/C Status CH= 0%      NOMS Swallowing Levels:  Level 1 (CN): NPO  Level 2 (CM): NPO but takes consistency in therapy  Level 3 (CL): Takes less than 50% of nutrition p.o. and continues with nonoral feedings; and/or safe with mod cues; and/or max diet restriction  Level 4 (CK):  Safe swallow but needs mod cues; and/or mod diet restriction; and/or still requires some nonoral feeding/supplements  Level 5 (CJ): Safe swallow with min diet restriction; and/or needs min cues  Level 6 (CI): Independent with p.o.; rare cues; usually self cues; may need to avoid some foods or needs extra time  Level 7 [facility]): Independent for all p.o.  JOSHUA. (2003). National Outcomes Measurement System (NOMS): Adult Speech-Language Pathology User's Guide. Pain:  Pain Scale 1: Numeric (0 - 10)  Pain Intensity 1: 0     After treatment:   [] Patient left in no apparent distress sitting up in chair  [x] Patient left in no apparent distress in bed  [x] Call bell left within reach  [x] Nursing notified  [] Caregiver present  [] Bed alarm activated    COMMUNICATION/EDUCATION:   The patients plan of care including findings, recommendations, and recommended diet changes were discussed with: Registered Nurse. Patient was educated regarding His functional swallow as this relates to His diagnosis of CVA workup. He demonstrated Good understanding as evidenced by verbalization . [x] Patient/family have participated as able and agree with findings and recommendations. [] Patient is unable to participate in plan of care at this time. Thank you for this referral.  Bjorn Jean M.CD.  CCC-SLP   Time Calculation: 9 mins

## 2018-05-28 NOTE — PROGRESS NOTES
Reason for Admission: \"Dizziness\"; TIA                  RRAT Score:     16             Do you (patient/family) have any concerns for transition/discharge? NOTE: Patient will need [place] Orders for PT/OT              Plan for utilizing home health:     CM noted recommendation for [place] PT/OT. CM discussed [place] with patient and friend, and patient selected to use [facility], since they are onsite at [facility]. CM contacted the facility and confirmed that the contract is with [facility] and Kiley Rahman is contracted through them (rep is Devika: [phone]). CM submitted [place] referral to [facility] through [address]. Likelihood of readmission? Moderate            Transition of Care Plan:      CM met with patient and friend at bedside. [facility] [address]. Patient is independent in ADLs with DME of: rollator and walker. Patient is a resident of the independent living at [facility]. Patient no longer drives, but his daughter is local and has friends available for transportation needs. CM noted HH PT/OT recommendation. With [place] [facility] Choice, patient selected to use [facility] since they are onsite. CM contacted [facility] and confirmed that the facility uses [facility] and they contract with [facility]. Angel Prajapati ([phone]) is [facility] rep; CM submitted referral to [facility] through [address] NOTE: Patient needs HH PT/OT orders, to include frequency and length. Care Management Interventions  PCP Verified by CM: Yes (Patient is fairly new to the area from [place] and does not have a PCP at this time.)  Palliative Care Criteria Met (RRAT>21 & CHF Dx)?: No  Mode of Transport at Discharge:  Other (see comment) (Patient's daughter or friend Luis Joaquin) will transport home.)  Transition of Care Consult (CM Consult): Discharge Planning, [address]: No  Reason [place]: Patient already serviced by other home care/hospice agency (Patient selected [facility] due to residing at [facility] and they have onsite services)  [address]: No  Discharge Durable Medical Equipment: No  Health Maintenance Reviewed: Yes (CM met with patient at bedside with friend Fanny Ladd) present with patient alert and oriented x 4)  Physical Therapy Consult: Yes  Occupational Therapy Consult: Yes  Speech Therapy Consult: No  Current Support Network: Assisted Living, Lives Alone, Other (Patient is a resident of [facility])  Confirm Follow Up Transport: Other (see comment) (Family and friends are available for transport)  Plan discussed with Pt/Family/Caregiver: Yes (Patient's friend was at bedside)  Freedom of Choice Offered: Yes  Discharge Location  Discharge Placement: Home with home health (CM noted recommendation of [facility] PT/OT)    CRM: Nichol Wills, MPH; Z: [phone]

## 2018-05-28 NOTE — DISCHARGE INSTRUCTIONS
Discharge Instructions       PATIENT ID: James Conteh  MRN: [id number]   YOB: 1940    DATE OF ADMISSION: 5/26/2018  7:25 AM    DATE OF DISCHARGE: 5/28/2018    PRIMARY CARE PROVIDER: None     ATTENDING PHYSICIAN: Gale Muniz MD  DISCHARGING PROVIDER: Muna Huggins NP    To contact this individual call [phone] and ask the  to page. If unavailable ask to be transferred the Adult Hospitalist Department. DISCHARGE DIAGNOSES     Dizziness/ Vertigo     CONSULTATIONS: IP CONSULT TO NEUROLOGY    PROCEDURES/SURGERIES: * No surgery found *    PENDING TEST RESULTS:   At the time of discharge the following test results are still pending: none    FOLLOW UP APPOINTMENTS:   Follow-up Information     Follow up With Details Comments Contact Info    None   None (395) Patient stated that they have no PCP             ADDITIONAL CARE RECOMMENDATIONS:         DIET: Regular Diet    ACTIVITY: Activity as tolerated      DISCHARGE MEDICATIONS:   See Medication Reconciliation Form    · It is important that you take the medication exactly as they are prescribed. · Keep your medication in the bottles provided by the pharmacist and keep a list of the medication names, dosages, and times to be taken in your wallet. · Do not take other medications without consulting your doctor. NOTIFY YOUR PHYSICIAN FOR ANY OF THE FOLLOWING:   Fever over 101 degrees for 24 hours. Chest pain, shortness of breath, fever, chills, nausea, vomiting, diarrhea, change in mentation, falling, weakness, bleeding. Severe pain or pain not relieved by medications. Or, any other signs or symptoms that you may have questions about. DISPOSITION:   X Home With: none   OT  PT  HH  RN       SNF/Inpatient Rehab/LTAC    Independent/assisted living    Hospice    Other:     CDMP Checked: Yes X     PROBLEM LIST Updated:   Yes X       Signed:   Muna Huggins NP  5/28/2018  2:45 PM

## 2018-05-28 NOTE — ROUTINE PROCESS
IDR/SLIDR Summary          Patient: Roxann Rice MRN: [id number]    Age: 76 y.o. YOB: 1940 Room/Bed: [facility]   Admit Diagnosis: TIA (transient ischemic attack)  Principal Diagnosis: Dizziness and giddiness   Goals: Testing today   Readmission: NO  Quality Measure: Not applicable  VTE Prophylaxis: Chemical  Influenza Vaccine screening completed? YES  Pneumococcal Vaccine screening completed? NO  Mobility needs: Yes   Nutrition plan:Yes  Consults:P.T, O.T. and Case Management    Financial concerns:No  Escalated to CM? YES  RRAT Score: 12   Interventions:  Testing due for pt today?  YES  LOS: 0 days Expected length of stay 4 days  Discharge plan: Home health    PCP: None  Transportation needs: Yes    Days before discharge:one day until discharge   Discharge disposition: Home    Signed:     Reynold Mendosa RN  5/28/2018  8:33 AM

## 2018-05-28 NOTE — DISCHARGE SUMMARY
Discharge Summary       PATIENT ID: Azalea Sepulveda  MRN: [id number]   YOB: 1940    DATE OF ADMISSION: 5/26/2018  7:25 AM    DATE OF DISCHARGE: 5/28/2018  PRIMARY CARE PROVIDER: None       DISCHARGING PHYSICIAN: Eduardo Lewis NP    To contact this individual call [phone] and ask the  to page. If unavailable ask to be transferred the Adult Hospitalist Department. CONSULTATIONS: IP CONSULT TO NEUROLOGY    PROCEDURES/SURGERIES: * No surgery found *    ADMITTING [address] [place] [address] COURSE:     Chetan Elam a 76 y. o. male with past medical history of HTN, diabetes (not on medication), meningioma, and vertigo who presents from home via EMS with chief complaint of dizziness. At 06:30 AM today he woke up with sudden onset of dizziness today while trying to get out of bed. He was too dizzy to stand so he \"slid\" off the bed onto the floor. Dizziness is not positional, it was associated with mild nausea. He had some numbness at the left lower leg. He has been having frontal headache for one day and recent neck stiffness. He has not been compliant with his HTN meds and is not medicated for diabetes. When I saw the patient at the ED he was feeling fine. He wanted to go home after MRI. Patient daughter and I dicussed with the patient for observation today.      The patient denies any fever, chills, chest pain, cough, congestion, recent illness, palpitations, or dysuria. Patient has ongoing urinary incontinence. No sudden changes in hearing or vision problems. No LOC. No abdominal pain, nausea and vomiting. 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 There was no tech available over weekend to perform ECHO cardiogram or today as it is a holiday. The patient and his family have been very adamant that they will follow up with cardiology in the next week for ECHO to be performed.  The patient has improved dramatically and does not have any other cardiac symptoms, no chest pains or signs of CAD. I will discharge him with plans for cardiac follow up. Case management will follow up with him tomorrow to ensure that this appointment gets arranged. DISCHARGE DIAGNOSES / PLAN:      Dizziness r/t BPV  - CT head negative for acute findings, unchanged supra-sellar mass/meningioma  - mri brain w/o acute ischemia, noted unchanged meningioma  - CUS: patent vessels  - has done vestibular therapy in the past with sucess  - lipid panel not completed as patient refused labs in am  - TSH is normal  - no arrhythmia's on tele  - evaluated by neuro  - c/w asa  - OP vestibular therapy will be arranged by his daughter at the [facility] and [address] [place] [address] at [address] patient also has home health PT/OT arranged through [address] with case management      DM2   - diagnosed about 5 years ago, controlled with diet, does not take any medications  - A1c 7.3 (163)  - discussed and counseled on lifestyle changes   - patient is obtaining a primary care physician this week, he already has someone in mind that he would like to schedule with , case management will follow up with this      HTN - c/w lisinopril     CKD vs ADAM  - can/t really tell at this point if he has ckd, he does have underlying dm which could contribute to CKD, he will not allow labs to be redrawn, but he will follow up with a PCP that his daughter is setting up for him          PENDING TEST RESULTS:   At the time of discharge the following test results are still pending: none     FOLLOW UP APPOINTMENTS:    Follow-up Information     Follow up With Details Comments Contact Info    None   None (395) Patient stated that they have no PCP             ADDITIONAL CARE RECOMMENDATIONS:     1. Follow up with primary care physician in 7-14 days as needed or if patient worsens.    2. You should set up Echocardiogram outpatient with cardiologist in the next 2-4 weeks. Your primary care provider ( once established) can help refer you to cardiology for ECHO. DIET: Resume previous diet    DIABETIC DIET     ACTIVITY: Activity as tolerated      DISCHARGE MEDICATIONS:  Current Discharge Medication List      CONTINUE these medications which have NOT CHANGED    Details   acetaminophen (TYLENOL EXTRA STRENGTH) 500 mg tablet Take 500 mg by mouth every six (6) hours as needed for Pain.      multivitamin (ONE A DAY) tablet Take 1 Tab by mouth daily. lisinopril (PRINIVIL, ZESTRIL) 5 mg tablet Take 5 mg by mouth daily. NOTIFY YOUR PHYSICIAN FOR ANY OF THE FOLLOWING:   Fever over 101 degrees for 24 hours. Chest pain, shortness of breath, fever, chills, nausea, vomiting, diarrhea, change in mentation, falling, weakness, bleeding. Severe pain or pain not relieved by medications. Or, any other signs or symptoms that you may have questions about. DISPOSITION:    Home With:   X OT X PT X HH  RN       Long term SNF/Inpatient Rehab    Independent/assisted living    Hospice    Other:       PATIENT CONDITION AT DISCHARGE:     Functional status    Poor     Deconditioned    X Independent      Cognition   X  Lucid     Forgetful     Dementia      Catheters/lines (plus indication)    Gran[place]     PICC     PEG    X None      Code status   X  Full code     DNR      PHYSICAL EXAMINATION AT DISCHARGE:    Constitutional:  No acute distress, cooperative, pleasant    ENT:  Oral mucous moist, oropharynx benign. Neck supple   Resp:  CTA bilaterally. No wheezing/rhonchi/rales. No accessory muscle use   CV:  Regular rhythm, normal rate, no murmurs, gallops, rubs. NSR on tele    GI:  Soft, non distended, non tender. normoactive bowel sounds, no hepatosplenomegaly     Musculoskeletal:  No edema, warm, 2+ pulses throughout    Neurologic:  Moves all extremities.   AAOx3, Unsteady upon standing and falls back                                             Psych:  Good insight, Not anxious nor agitated.                               Skin:  Good turgor, no rashes or ulcers              CHRONIC MEDICAL DIAGNOSES:  Problem List as of 5/28/2018  Date Reviewed: 10/12/2017          Codes Class Noted - Resolved    TIA (transient ischemic attack) ICD-10-CM: G45.9  ICD-9-CM: 435.9  5/28/2018 - Present        * (Principal)Dizziness and giddiness ICD-10-CM: R42  ICD-9-CM: 780.4  5/26/2018 - Present        Benign positional vertigo ICD-10-CM: H81.10  ICD-9-CM: 386.11  5/26/2018 - Present        Leukocytosis ICD-10-CM: D72.829  ICD-9-CM: 288.60  10/12/2017 - Present        Generalized abdominal pain ICD-10-CM: R10.84  ICD-9-CM: 789.07  10/12/2017 - Present        ADAM (acute kidney injury) ([facility] [address] ICD-10-CM: N17.9  ICD-9-CM: 584.9  10/12/2017 - Present              Greater than 30 minutes were spent with the patient on counseling and coordination of care    Signed:   Minna Lou NP  5/28/2018  5:09 PM

## 2018-05-28 NOTE — PROGRESS NOTES
Care Management Update    Received call from NP, Pawel Chester requesting assistance with follow up. Mr. Eduardo Hernandez needs PCP and specialist/[facility]. Will forward to co-worker for follow up.     Radha Navarrete, CRM

## 2018-05-28 NOTE — ROUTINE PROCESS
I have reviewed discharge instructions with the patient and caregiver. The patient and caregiver verbalized understanding. Discharge medications reviewed with patient and caregiver and appropriate educational materials and side effects /teaching were provided. Patient stated that he may comply. PIV and telemetry discontinued. Pt transferred back to assisted living facility via car with his daughter. No s/s of visible distress. No complaints voiced. Stroke Education documented in Patient Education: YES  Core Measures Documented in [facility]:  Risk Factors: YES  Warning signs of stroke: YES  When to Activate 911: YES  Medication Education for Risk Factors: YES  Smoking cessation if applicable: YES  Written Education Given:  YES    Discharge NIH Completed: YES  Score: 0    BRAINS: Bedside RN performed patient education and medication education. Discharge concerns initiated and discussed with patient, including clarification on \"who\" assists the patient at their home and instructions for when the home going patient should call their provider after discharge. Opportunity for questions and clarification was provided. Patient receptive to education: NO  Patient stated: That he would maybe comply   Barriers to Education: none  Diagnosis Education given:  YES    Length of stay: 0  Expected Day of Discharge: Today   Ask if they have \"Help at Home\" & add to white board? YES    Education Day #: 2    Medication Education Given:  YES  M in the box Medication name: Lisinopril     Pt aware of [facility] survey: NO> patient not going home. Follow Up Appointment Made: NO  Date/Time if applicable: Pt does not have PCP nor cardiologist. Case management made aware, will f/u tomorrow.  See NP note 5/28/2018  (Cherrie Leiva)

## 2018-05-28 NOTE — PROGRESS NOTES
Problem: Discharge Planning  Goal: *Discharge to safe environment  Outcome: Progressing Towards Goal  See CM Notes.  CRM: Nichol Wills, MPH; Z: [phone]

## 2018-05-30 NOTE — PROGRESS NOTES
LATE NOTE- 5/30/2018 11:30am: ELO noted no order had been sent for [place]. ELO faxed Home Health order to Austin Hein at [facility]. Fax # [phone].   Ana Mcdaniel RN CRM

## 2018-05-31 NOTE — PROGRESS NOTES
Late note-Cm was asked by Patient Advocacy to check on this pt's home health that was set up earlier in the week. ELO spoke with the nurse manager, Shelbie Noriega ([phone]) at [facility]. She informed this CM that this pt is going to be opened to services today. CM informed Patient Advocacy.  Una Currie

## 2019-12-21 ENCOUNTER — HOSPITAL ENCOUNTER (INPATIENT)
Age: 79
LOS: 7 days | Discharge: NURSING, REHAB OR LONG-TERM CARE | DRG: 470 | End: 2019-12-28
Attending: EMERGENCY MEDICINE | Admitting: HOSPITALIST
Payer: MEDICARE

## 2019-12-21 ENCOUNTER — APPOINTMENT (OUTPATIENT)
Dept: GENERAL RADIOLOGY | Age: 79
DRG: 470 | End: 2019-12-21
Attending: EMERGENCY MEDICINE
Payer: MEDICARE

## 2019-12-21 DIAGNOSIS — S72.002A CLOSED FRACTURE OF NECK OF LEFT FEMUR, INITIAL ENCOUNTER (HCC): Primary | ICD-10-CM

## 2019-12-21 LAB
ALBUMIN SERPL-MCNC: 3.8 G/DL (ref 3.5–5)
ALBUMIN/GLOB SERPL: 1.2 {RATIO} (ref 1.1–2.2)
ALP SERPL-CCNC: 88 U/L (ref 45–117)
ALT SERPL-CCNC: 26 U/L (ref 12–78)
ANION GAP SERPL CALC-SCNC: 11 MMOL/L (ref 5–15)
AST SERPL-CCNC: 16 U/L (ref 15–37)
BASOPHILS # BLD: 0 K/UL (ref 0–0.1)
BASOPHILS NFR BLD: 0 % (ref 0–1)
BILIRUB SERPL-MCNC: 0.5 MG/DL (ref 0.2–1)
BUN SERPL-MCNC: 27 MG/DL (ref 6–20)
BUN/CREAT SERPL: 16 (ref 12–20)
CALCIUM SERPL-MCNC: 9 MG/DL (ref 8.5–10.1)
CHLORIDE SERPL-SCNC: 101 MMOL/L (ref 97–108)
CO2 SERPL-SCNC: 26 MMOL/L (ref 21–32)
COMMENT, HOLDF: NORMAL
CREAT SERPL-MCNC: 1.67 MG/DL (ref 0.7–1.3)
DIFFERENTIAL METHOD BLD: ABNORMAL
EOSINOPHIL # BLD: 0.1 K/UL (ref 0–0.4)
EOSINOPHIL NFR BLD: 1 % (ref 0–7)
ERYTHROCYTE [DISTWIDTH] IN BLOOD BY AUTOMATED COUNT: 14.5 % (ref 11.5–14.5)
GLOBULIN SER CALC-MCNC: 3.1 G/DL (ref 2–4)
GLUCOSE SERPL-MCNC: 180 MG/DL (ref 65–100)
HCT VFR BLD AUTO: 43.3 % (ref 36.6–50.3)
HGB BLD-MCNC: 14.2 G/DL (ref 12.1–17)
IMM GRANULOCYTES # BLD AUTO: 0.1 K/UL (ref 0–0.04)
IMM GRANULOCYTES NFR BLD AUTO: 1 % (ref 0–0.5)
LYMPHOCYTES # BLD: 1.5 K/UL (ref 0.8–3.5)
LYMPHOCYTES NFR BLD: 12 % (ref 12–49)
MCH RBC QN AUTO: 28 PG (ref 26–34)
MCHC RBC AUTO-ENTMCNC: 32.8 G/DL (ref 30–36.5)
MCV RBC AUTO: 85.4 FL (ref 80–99)
MONOCYTES # BLD: 0.7 K/UL (ref 0–1)
MONOCYTES NFR BLD: 6 % (ref 5–13)
NEUTS SEG # BLD: 10.3 K/UL (ref 1.8–8)
NEUTS SEG NFR BLD: 80 % (ref 32–75)
NRBC # BLD: 0 K/UL (ref 0–0.01)
NRBC BLD-RTO: 0 PER 100 WBC
PLATELET # BLD AUTO: 437 K/UL (ref 150–400)
PMV BLD AUTO: 11 FL (ref 8.9–12.9)
POTASSIUM SERPL-SCNC: 4.8 MMOL/L (ref 3.5–5.1)
PROT SERPL-MCNC: 6.9 G/DL (ref 6.4–8.2)
RBC # BLD AUTO: 5.07 M/UL (ref 4.1–5.7)
SAMPLES BEING HELD,HOLD: NORMAL
SODIUM SERPL-SCNC: 138 MMOL/L (ref 136–145)
WBC # BLD AUTO: 12.6 K/UL (ref 4.1–11.1)

## 2019-12-21 PROCEDURE — 80053 COMPREHEN METABOLIC PANEL: CPT

## 2019-12-21 PROCEDURE — 99285 EMERGENCY DEPT VISIT HI MDM: CPT

## 2019-12-21 PROCEDURE — 74011250636 HC RX REV CODE- 250/636: Performed by: EMERGENCY MEDICINE

## 2019-12-21 PROCEDURE — 36415 COLL VENOUS BLD VENIPUNCTURE: CPT

## 2019-12-21 PROCEDURE — 65270000029 HC RM PRIVATE

## 2019-12-21 PROCEDURE — 85025 COMPLETE CBC W/AUTO DIFF WBC: CPT

## 2019-12-21 PROCEDURE — 73562 X-RAY EXAM OF KNEE 3: CPT

## 2019-12-21 PROCEDURE — 96374 THER/PROPH/DIAG INJ IV PUSH: CPT

## 2019-12-21 PROCEDURE — 71045 X-RAY EXAM CHEST 1 VIEW: CPT

## 2019-12-21 PROCEDURE — 73502 X-RAY EXAM HIP UNI 2-3 VIEWS: CPT

## 2019-12-21 RX ORDER — TAMSULOSIN HYDROCHLORIDE 0.4 MG/1
0.4 CAPSULE ORAL DAILY
COMMUNITY

## 2019-12-21 RX ORDER — MORPHINE SULFATE 2 MG/ML
2 INJECTION, SOLUTION INTRAMUSCULAR; INTRAVENOUS
Status: DISCONTINUED | OUTPATIENT
Start: 2019-12-21 | End: 2019-12-28 | Stop reason: HOSPADM

## 2019-12-21 RX ORDER — MORPHINE SULFATE 4 MG/ML
4 INJECTION INTRAVENOUS ONCE
Status: COMPLETED | OUTPATIENT
Start: 2019-12-22 | End: 2019-12-22

## 2019-12-21 RX ORDER — MELATONIN
DAILY
COMMUNITY
End: 2019-12-28

## 2019-12-21 RX ORDER — METFORMIN HYDROCHLORIDE 500 MG/1
TABLET ORAL 2 TIMES DAILY WITH MEALS
COMMUNITY

## 2019-12-21 RX ORDER — MORPHINE SULFATE 2 MG/ML
4 INJECTION, SOLUTION INTRAMUSCULAR; INTRAVENOUS
Status: COMPLETED | OUTPATIENT
Start: 2019-12-21 | End: 2019-12-21

## 2019-12-21 RX ORDER — SODIUM CHLORIDE, SODIUM LACTATE, POTASSIUM CHLORIDE, CALCIUM CHLORIDE 600; 310; 30; 20 MG/100ML; MG/100ML; MG/100ML; MG/100ML
75 INJECTION, SOLUTION INTRAVENOUS CONTINUOUS
Status: DISCONTINUED | OUTPATIENT
Start: 2019-12-21 | End: 2019-12-23

## 2019-12-21 RX ORDER — ACETAMINOPHEN 325 MG/1
650 TABLET ORAL
Status: DISCONTINUED | OUTPATIENT
Start: 2019-12-21 | End: 2019-12-26

## 2019-12-21 RX ORDER — ONDANSETRON 2 MG/ML
4 INJECTION INTRAMUSCULAR; INTRAVENOUS
Status: COMPLETED | OUTPATIENT
Start: 2019-12-22 | End: 2019-12-22

## 2019-12-21 RX ORDER — CHOLECALCIFEROL (VITAMIN D3) 125 MCG
5000 CAPSULE ORAL
COMMUNITY
End: 2019-12-28

## 2019-12-21 RX ADMIN — MORPHINE SULFATE 4 MG: 2 INJECTION, SOLUTION INTRAMUSCULAR; INTRAVENOUS at 20:49

## 2019-12-21 NOTE — ED PROVIDER NOTES
Mr. Nevin Young i an 78-year-old male who presents to the ERs with complaints of left hip pain. He said he was sitting in a chair when he fell out of it. He is not sure how he fell. He did not hit his head. He did not lose consciousness. He says that he is not in pain when he says though. However, he is in considerable pain when he moves his left leg. He denies any fevers or chills recently. No nausea or vomiting. No changes with his urine or bowel movements. He denies any other complaints. Past Medical History:   Diagnosis Date    Diabetes ([facility] [address]     diet controlled    Hypertension     Meningioma ([facility] [address]     Vertigo        Past Surgical History:   Procedure Laterality Date    HX TONSILLECTOMY           History reviewed. No pertinent family history.     Social History     Socioeconomic History    Marital status:      Spouse name: Not on file    Number of children: Not on file    Years of education: Not on file    Highest education level: Not on file   Occupational History    Not on file   Social Needs    Financial resource strain: Not on file    Food insecurity:     Worry: Not on file     Inability: Not on file    Transportation needs:     Medical: Not on file     Non-medical: Not on file   Tobacco Use    Smoking status: Never Smoker    Smokeless tobacco: Never Used   Substance and Sexual Activity    Alcohol use: No    Drug use: Not on file    Sexual activity: Not on file   Lifestyle    Physical activity:     Days per week: Not on file     Minutes per session: Not on file    Stress: Not on file   Relationships    Social connections:     Talks on phone: Not on file     Gets together: Not on file     Attends Scientologist service: Not on file     Active member of club or organization: Not on file     Attends meetings of clubs or organizations: Not on file     Relationship status: Not on file    Intimate partner violence:     Fear of current or ex partner: Not on file Emotionally abused: Not on file     Physically abused: Not on file     Forced sexual activity: Not on file   Other Topics Concern    Not on file   Social History Narrative    Not on file         ALLERGIES: Codeine; Contrast dye [iodine]; and Pcn [penicillins]    Review of Systems   Constitutional: Negative for chills and fever. HENT: Negative for rhinorrhea and sore throat. Respiratory: Negative for cough and shortness of breath. Cardiovascular: Negative for chest pain. Gastrointestinal: Negative for abdominal pain, diarrhea, nausea and vomiting. Genitourinary: Negative for dysuria and hematuria. Musculoskeletal:        Left hip pain   Skin: Negative for pallor and rash. Neurological: Negative for dizziness, weakness and light-headedness. All other systems reviewed and are negative. There were no vitals filed for this visit. Physical Exam     Vital signs reviewed. Nursing notes reviewed. Const:  No acute distress, well developed, well nourished  Head:  Atraumatic, normocephalic  Eyes:  PERRL, conjunctiva normal, no scleral icterus  Neck:  Supple, trachea midline  Cardiovascular:  RRR, no murmurs, no gallops, no rubs  Resp:  No resp distress, no increased work of breathing, no wheezes, no rhonchi, no rales,  Abd:  Soft, non-tender, non-distended, no rebound, no guarding, no CVA tenderness  MSK: Minimal pain to left hip with palpation.   Moderate pain to the left hip with minimal range of motion of the left hip no tenderness to the left knee, shin, or ankle to palpation,   Neuro:  Alert and oriented x3, no cranial nerve defect  Skin:  Warm, dry, intact  Psych: normal mood and affect, behavior is normal, judgement and thought content is normal        MDM  Number of Diagnoses or Management Options     Amount and/or Complexity of Data Reviewed  Clinical lab tests: ordered and reviewed  Tests in the radiology section of CPT®: ordered and reviewed  Review and summarize past medical records: yes    Patient Progress  Patient progress: stable          Pt. Presents to the ER with hip pain after a fall. I suspect a hip fx. Xray pending. Pt. Lynnette Heaps out to Dr. Rhona Mallory. He will likely need to be admitted.       Procedures

## 2019-12-21 NOTE — ED TRIAGE NOTES
Triage note: Per EMS Pt tripped and fell on left side/hip. Reports left hip pain. No LOC. Pt coming from [facility].

## 2019-12-22 PROBLEM — S72.009A FEMORAL NECK FRACTURE (HCC): Status: ACTIVE | Noted: 2019-12-22

## 2019-12-22 LAB
ABO + RH BLD: NORMAL
ALBUMIN SERPL-MCNC: 3.9 G/DL (ref 3.5–5)
ALBUMIN/GLOB SERPL: 1.3 {RATIO} (ref 1.1–2.2)
ALP SERPL-CCNC: 92 U/L (ref 45–117)
ALT SERPL-CCNC: 23 U/L (ref 12–78)
ANION GAP SERPL CALC-SCNC: 5 MMOL/L (ref 5–15)
AST SERPL-CCNC: 13 U/L (ref 15–37)
ATRIAL RATE: 82 BPM
BASOPHILS # BLD: 0.1 K/UL (ref 0–0.1)
BASOPHILS NFR BLD: 0 % (ref 0–1)
BILIRUB SERPL-MCNC: 1 MG/DL (ref 0.2–1)
BLOOD GROUP ANTIBODIES SERPL: NORMAL
BUN SERPL-MCNC: 23 MG/DL (ref 6–20)
BUN/CREAT SERPL: 14 (ref 12–20)
CALCIUM SERPL-MCNC: 9.1 MG/DL (ref 8.5–10.1)
CALCULATED P AXIS, ECG09: 64 DEGREES
CALCULATED R AXIS, ECG10: -28 DEGREES
CALCULATED T AXIS, ECG11: 9 DEGREES
CHLORIDE SERPL-SCNC: 105 MMOL/L (ref 97–108)
CO2 SERPL-SCNC: 27 MMOL/L (ref 21–32)
CREAT SERPL-MCNC: 1.59 MG/DL (ref 0.7–1.3)
DIAGNOSIS, 93000: NORMAL
DIFFERENTIAL METHOD BLD: ABNORMAL
EOSINOPHIL # BLD: 0.1 K/UL (ref 0–0.4)
EOSINOPHIL NFR BLD: 1 % (ref 0–7)
ERYTHROCYTE [DISTWIDTH] IN BLOOD BY AUTOMATED COUNT: 14.5 % (ref 11.5–14.5)
EST. AVERAGE GLUCOSE BLD GHB EST-MCNC: 160 MG/DL
GLOBULIN SER CALC-MCNC: 3.1 G/DL (ref 2–4)
GLUCOSE BLD STRIP.AUTO-MCNC: 137 MG/DL (ref 65–100)
GLUCOSE BLD STRIP.AUTO-MCNC: 165 MG/DL (ref 65–100)
GLUCOSE BLD STRIP.AUTO-MCNC: 236 MG/DL (ref 65–100)
GLUCOSE BLD STRIP.AUTO-MCNC: 240 MG/DL (ref 65–100)
GLUCOSE SERPL-MCNC: 155 MG/DL (ref 65–100)
HBA1C MFR BLD: 7.2 % (ref 4–5.6)
HCT VFR BLD AUTO: 43.1 % (ref 36.6–50.3)
HGB BLD-MCNC: 14.2 G/DL (ref 12.1–17)
IMM GRANULOCYTES # BLD AUTO: 0.1 K/UL (ref 0–0.04)
IMM GRANULOCYTES NFR BLD AUTO: 1 % (ref 0–0.5)
INR PPP: 1.1 (ref 0.9–1.1)
LYMPHOCYTES # BLD: 1.6 K/UL (ref 0.8–3.5)
LYMPHOCYTES NFR BLD: 9 % (ref 12–49)
MCH RBC QN AUTO: 28.4 PG (ref 26–34)
MCHC RBC AUTO-ENTMCNC: 32.9 G/DL (ref 30–36.5)
MCV RBC AUTO: 86.2 FL (ref 80–99)
MONOCYTES # BLD: 1.2 K/UL (ref 0–1)
MONOCYTES NFR BLD: 6 % (ref 5–13)
NEUTS SEG # BLD: 15.2 K/UL (ref 1.8–8)
NEUTS SEG NFR BLD: 83 % (ref 32–75)
NRBC # BLD: 0 K/UL (ref 0–0.01)
NRBC BLD-RTO: 0 PER 100 WBC
P-R INTERVAL, ECG05: 190 MS
PLATELET # BLD AUTO: 390 K/UL (ref 150–400)
PMV BLD AUTO: 11 FL (ref 8.9–12.9)
POTASSIUM SERPL-SCNC: 4.4 MMOL/L (ref 3.5–5.1)
PROT SERPL-MCNC: 7 G/DL (ref 6.4–8.2)
PROTHROMBIN TIME: 10.7 SEC (ref 9–11.1)
Q-T INTERVAL, ECG07: 360 MS
QRS DURATION, ECG06: 88 MS
QTC CALCULATION (BEZET), ECG08: 420 MS
RBC # BLD AUTO: 5 M/UL (ref 4.1–5.7)
SERVICE CMNT-IMP: ABNORMAL
SODIUM SERPL-SCNC: 137 MMOL/L (ref 136–145)
SPECIMEN EXP DATE BLD: NORMAL
VENTRICULAR RATE, ECG03: 82 BPM
WBC # BLD AUTO: 18.3 K/UL (ref 4.1–11.1)

## 2019-12-22 PROCEDURE — 96375 TX/PRO/DX INJ NEW DRUG ADDON: CPT

## 2019-12-22 PROCEDURE — 74011250637 HC RX REV CODE- 250/637: Performed by: INTERNAL MEDICINE

## 2019-12-22 PROCEDURE — 85025 COMPLETE CBC W/AUTO DIFF WBC: CPT

## 2019-12-22 PROCEDURE — 83036 HEMOGLOBIN GLYCOSYLATED A1C: CPT

## 2019-12-22 PROCEDURE — 74011250636 HC RX REV CODE- 250/636: Performed by: EMERGENCY MEDICINE

## 2019-12-22 PROCEDURE — 85610 PROTHROMBIN TIME: CPT

## 2019-12-22 PROCEDURE — 65270000029 HC RM PRIVATE

## 2019-12-22 PROCEDURE — 80053 COMPREHEN METABOLIC PANEL: CPT

## 2019-12-22 PROCEDURE — 93005 ELECTROCARDIOGRAM TRACING: CPT

## 2019-12-22 PROCEDURE — 74011250636 HC RX REV CODE- 250/636: Performed by: INTERNAL MEDICINE

## 2019-12-22 PROCEDURE — 82962 GLUCOSE BLOOD TEST: CPT

## 2019-12-22 PROCEDURE — 86900 BLOOD TYPING SEROLOGIC ABO: CPT

## 2019-12-22 PROCEDURE — 96376 TX/PRO/DX INJ SAME DRUG ADON: CPT

## 2019-12-22 PROCEDURE — 36415 COLL VENOUS BLD VENIPUNCTURE: CPT

## 2019-12-22 RX ORDER — ACETAMINOPHEN 500 MG
1000 TABLET ORAL EVERY 8 HOURS
Status: DISCONTINUED | OUTPATIENT
Start: 2019-12-22 | End: 2019-12-23

## 2019-12-22 RX ORDER — HYDRALAZINE HYDROCHLORIDE 20 MG/ML
10 INJECTION INTRAMUSCULAR; INTRAVENOUS
Status: DISCONTINUED | OUTPATIENT
Start: 2019-12-22 | End: 2019-12-28 | Stop reason: HOSPADM

## 2019-12-22 RX ORDER — MAGNESIUM SULFATE 100 %
4 CRYSTALS MISCELLANEOUS AS NEEDED
Status: DISCONTINUED | OUTPATIENT
Start: 2019-12-22 | End: 2019-12-28 | Stop reason: HOSPADM

## 2019-12-22 RX ORDER — TAMSULOSIN HYDROCHLORIDE 0.4 MG/1
0.4 CAPSULE ORAL DAILY
Status: DISCONTINUED | OUTPATIENT
Start: 2019-12-23 | End: 2019-12-28 | Stop reason: HOSPADM

## 2019-12-22 RX ORDER — SODIUM CHLORIDE 9 MG/ML
75 INJECTION, SOLUTION INTRAVENOUS CONTINUOUS
Status: CANCELLED | OUTPATIENT
Start: 2019-12-22

## 2019-12-22 RX ORDER — DEXTROSE 50 % IN WATER (D50W) INTRAVENOUS SYRINGE
25-50 AS NEEDED
Status: DISCONTINUED | OUTPATIENT
Start: 2019-12-22 | End: 2019-12-22 | Stop reason: CLARIF

## 2019-12-22 RX ORDER — INSULIN LISPRO 100 [IU]/ML
INJECTION, SOLUTION INTRAVENOUS; SUBCUTANEOUS EVERY 6 HOURS
Status: DISCONTINUED | OUTPATIENT
Start: 2019-12-22 | End: 2019-12-25

## 2019-12-22 RX ORDER — OXYCODONE HYDROCHLORIDE 5 MG/1
5 TABLET ORAL
Status: DISCONTINUED | OUTPATIENT
Start: 2019-12-22 | End: 2019-12-23

## 2019-12-22 RX ORDER — SODIUM CHLORIDE 0.9 % (FLUSH) 0.9 %
5-40 SYRINGE (ML) INJECTION AS NEEDED
Status: DISCONTINUED | OUTPATIENT
Start: 2019-12-22 | End: 2019-12-28 | Stop reason: HOSPADM

## 2019-12-22 RX ORDER — SODIUM CHLORIDE 0.9 % (FLUSH) 0.9 %
5-40 SYRINGE (ML) INJECTION EVERY 8 HOURS
Status: DISCONTINUED | OUTPATIENT
Start: 2019-12-22 | End: 2019-12-27

## 2019-12-22 RX ORDER — NALOXONE HYDROCHLORIDE 0.4 MG/ML
0.4 INJECTION, SOLUTION INTRAMUSCULAR; INTRAVENOUS; SUBCUTANEOUS AS NEEDED
Status: DISCONTINUED | OUTPATIENT
Start: 2019-12-22 | End: 2019-12-23

## 2019-12-22 RX ADMIN — SODIUM CHLORIDE, SODIUM LACTATE, POTASSIUM CHLORIDE, AND CALCIUM CHLORIDE 75 ML/HR: 600; 310; 30; 20 INJECTION, SOLUTION INTRAVENOUS at 01:07

## 2019-12-22 RX ADMIN — ACETAMINOPHEN 1000 MG: 500 TABLET ORAL at 21:59

## 2019-12-22 RX ADMIN — MORPHINE SULFATE 4 MG: 4 INJECTION INTRAVENOUS at 00:04

## 2019-12-22 RX ADMIN — MORPHINE SULFATE 2 MG: 2 INJECTION, SOLUTION INTRAMUSCULAR; INTRAVENOUS at 08:27

## 2019-12-22 RX ADMIN — ACETAMINOPHEN 1000 MG: 500 TABLET ORAL at 13:55

## 2019-12-22 RX ADMIN — Medication 10 ML: at 13:46

## 2019-12-22 RX ADMIN — Medication 10 ML: at 22:07

## 2019-12-22 RX ADMIN — ONDANSETRON 4 MG: 2 INJECTION INTRAMUSCULAR; INTRAVENOUS at 00:04

## 2019-12-22 RX ADMIN — MORPHINE SULFATE 2 MG: 2 INJECTION, SOLUTION INTRAMUSCULAR; INTRAVENOUS at 12:28

## 2019-12-22 RX ADMIN — MORPHINE SULFATE 2 MG: 2 INJECTION, SOLUTION INTRAMUSCULAR; INTRAVENOUS at 16:52

## 2019-12-22 NOTE — ED NOTES
TRANSFER - OUT REPORT:    Verbal report given to Dariel Negron on [facility]  being transferred to [facility](unit) for routine progression of care       Report consisted of patients Situation, Background, Assessment and   Recommendations(SBAR). Information from the following report(s) SBAR, Kardex, ED Summary, Procedure Summary, MAR and Recent Results was reviewed with the receiving nurse. Lines:   Peripheral IV 12/21/19 Right Antecubital (Active)   Site Assessment Clean, dry, & intact 12/21/2019  6:45 PM   Phlebitis Assessment 0 12/21/2019  6:45 PM   Infiltration Assessment 0 12/21/2019  6:45 PM   Dressing Status Clean, dry, & intact 12/21/2019  6:45 PM   Dressing Type Transparent 12/21/2019  6:45 PM   Hub Color/Line Status Pink 12/21/2019  6:45 PM   Action Taken Blood drawn 12/21/2019  6:45 PM        Opportunity for questions and clarification was provided.       Patient transported with:   Monitor

## 2019-12-22 NOTE — PROGRESS NOTES
Bedside shift change report given to [facility] (oncoming nurse) by Asiya Rogers RN(offgoing nurse). Report given with SBAR.

## 2019-12-22 NOTE — ED NOTES
7:02 PM  Change of shift. Care of patient taken over from Dr Jennifer Corado; H&P reviewed, bedside handoff complete. Awaiting imaging/ Pt in radiology currently;     8:36 PM  Pt told of hip fracture/ agrees w/ ortho evaluation/ admission to [facility];      CONSULT  NOTE  8:37 PM  Anne Marie Osuna MD communicated via [email or web address]  with Dr Prisca Pedersen. Specialty: Ortho  Discussed pt's hx, disposition, and available diagnostic and imaging results. Reviewed care plans. Consulting physician agrees with plans as outlined 'OK'. Hospitalist Perfect Serve for Admission  8:37 PM    ED Room Number: SER12/12  Patient Name and age:  Thedora Fabry 78 y.o.  male  Working Diagnosis:   1.  Closed fracture of neck of left femur, initial encounter ([facility] [address]      Readmission: no  Isolation Requirements:  no  Recommended Level of Care:  med/surg  Code Status:  Full Code  Department:[place] ED - [phone]  Other:  L femoral Neck Fracture

## 2019-12-22 NOTE — ED NOTES
Spoke with Daughter Vashti Beltran ([phone]). States that she is flying in from [facility] tomorrow and expected to arrive at 3. She requests that sx is help until she is there to help make decisions. She states that her father has dementia and is concerned he will refuse. MD notified.

## 2019-12-22 NOTE — H&P
History & Physical    Primary Care Provider: Angela Joel MD  Source of Information: Patient   Chief Complaint: \"I fell\"    History of Presenting Illness:   Karlie Lassiter is a 78 y.o. male with past medical history of hypertension, diabetes mellitus who presents from short [facility] ER on account of closed fracture of the neck of left femur. Patient reports that he fell out of a chair. He denies chest pain, shortness of breath. Currently complaining of back spasm. The patient denies any fever, chills, chest pain, cough, congestion, recent illness, palpitations, or dysuria. In the ER, Xray of the hip shows left subcapital femoral neck fracture. Review of Systems:  A 12 point review of system was reviewed including head, eyes, respiratory, cardiac, abdominal, genitourinary, musculoskeletal , neurology, endocrine, endocrine, psychiatry and integument and this was found to be negative apart from what is stated in the body of the history and physical.     Past Medical History:   Diagnosis Date    Diabetes ([address]     diet controlled    Hypertension     Meningioma ([facility] [address]     Vertigo       Past Surgical History:   Procedure Laterality Date    HX TONSILLECTOMY       Prior to Admission medications    Medication Sig Start Date End Date Taking? Authorizing Provider   metFORMIN (GLUCOPHAGE) 500 mg tablet Take  by mouth two (2) times daily (with meals). Yes Wisam, MD Angela   tamsulosin (FLOMAX) 0.4 mg capsule Take 0.4 mg by mouth daily. Yes Wisam, MD Angela   cholecalciferol (VITAMIN D3) (1000 Units /25 mcg) tablet Take  by mouth daily. Yes Wisam, MD Angela   cholecalciferol, vitamin D3, (VITAMIN D3) 2,000 unit tab Take 5,000 Units by mouth. Yes Wisam, MD Angela   lisinopril (PRINIVIL, ZESTRIL) 5 mg tablet Take 10 mg by mouth daily.     Provider, Historical     Allergies   Allergen Reactions    Codeine Vertigo    Contrast Dye [Iodine] Unknown (comments)    Pcn [Penicillins] Hives      History reviewed. No pertinent family history. SOCIAL HISTORY:  Patient resides:  Independently    Assisted Living    SNF    With family care       Smoking history:   None x   Former    Chronic      Alcohol history:   None x   Social    Chronic      Ambulates:   Independently x   w/cane    w/walker    w/wc    CODE STATUS:  DNR    Full x   Other      Objective:     Physical Exam:     Visit Vitals  BP (!) 161/92   Pulse 90   Temp 99.5 °F (37.5 °C)   Resp 16   Ht 5' 11\" (1.803 m)   Wt 85.9 kg (189 lb 6 oz)   SpO2 96%   BMI 26.41 kg/m²      O2 Device: Room air    General:  Alert, cooperative, no distress, appears stated age. Head:  Normocephalic, without obvious abnormality, atraumatic. Eyes:  Conjunctivae/corneas clear. PERRL, EOMs intact. Nose: Nares normal. Septum midline. Mucosa normal. No drainage or sinus tenderness. Throat: Lips, mucosa, and tongue normal. Teeth and gums normal.   Neck: Supple   Back:   Symmetric, no curvature. ROM normal. No CVA tenderness. Lungs:   Clear to auscultation bilaterally. Chest wall:  No tenderness or deformity. Heart:  Regular rate and rhythm, S1, S2 normal, no murmur, click, rub or gallop. Abdomen:   Soft, non-tender. Bowel sounds normal. No masses,  No organomegaly. Extremities: Left lower extremity externally rotated. Pulses: 2+ and symmetric all extremities. Skin: Skin color, texture, turgor normal. No rashes or lesions   Neurologic: Awake and alert. Oriented to name and place. Data Review:     Recent Days:  Recent Labs     12/21/19  1841   WBC 12.6*   HGB 14.2   HCT 43.3   *     Recent Labs     12/21/19  1841      K 4.8      CO2 26   *   BUN 27*   CREA 1.67*   CA 9.0   ALB 3.8   SGOT 16   ALT 26     No results for input(s): PH, PCO2, PO2, HCO3, FIO2 in the last 72 hours.     24 Hour Results:  Recent Results (from the past 24 hour(s))   METABOLIC PANEL, COMPREHENSIVE    Collection Time: 12/21/19  6:41 PM Result Value Ref Range    Sodium 138 136 - 145 mmol/L    Potassium 4.8 3.5 - 5.1 mmol/L    Chloride 101 97 - 108 mmol/L    CO2 26 21 - 32 mmol/L    Anion gap 11 5 - 15 mmol/L    Glucose 180 (H) 65 - 100 mg/dL    BUN 27 (H) 6 - 20 MG/DL    Creatinine 1.67 (H) 0.70 - 1.30 MG/DL    BUN/Creatinine ratio 16 12 - 20      GFR est AA 48 (L) >60 ml/min/1.73m2    GFR est non-AA 40 (L) >60 ml/min/1.73m2    Calcium 9.0 8.5 - 10.1 MG/DL    Bilirubin, total 0.5 0.2 - 1.0 MG/DL    ALT (SGPT) 26 12 - 78 U/L    AST (SGOT) 16 15 - 37 U/L    Alk. phosphatase 88 45 - 117 U/L    Protein, total 6.9 6.4 - 8.2 g/dL    Albumin 3.8 3.5 - 5.0 g/dL    Globulin 3.1 2.0 - 4.0 g/dL    A-G Ratio 1.2 1.1 - 2.2     CBC WITH AUTOMATED DIFF    Collection Time: 12/21/19  6:41 PM   Result Value Ref Range    WBC 12.6 (H) 4.1 - 11.1 K/uL    RBC 5.07 4. 10 - 5.70 M/uL    HGB 14.2 12.1 - 17.0 g/dL    HCT 43.3 36.6 - 50.3 %    MCV 85.4 80.0 - 99.0 FL    MCH 28.0 26.0 - 34.0 PG    MCHC 32.8 30.0 - 36.5 g/dL    RDW 14.5 11.5 - 14.5 %    PLATELET 101 (H) 899 - 400 K/uL    MPV 11.0 8.9 - 12.9 FL    NRBC 0.0 0  WBC    ABSOLUTE NRBC 0.00 0.00 - 0.01 K/uL    NEUTROPHILS 80 (H) 32 - 75 %    LYMPHOCYTES 12 12 - 49 %    MONOCYTES 6 5 - 13 %    EOSINOPHILS 1 0 - 7 %    BASOPHILS 0 0 - 1 %    IMMATURE GRANULOCYTES 1 (H) 0.0 - 0.5 %    ABS. NEUTROPHILS 10.3 (H) 1.8 - 8.0 K/UL    ABS. LYMPHOCYTES 1.5 0.8 - 3.5 K/UL    ABS. MONOCYTES 0.7 0.0 - 1.0 K/UL    ABS. EOSINOPHILS 0.1 0.0 - 0.4 K/UL    ABS. BASOPHILS 0.0 0.0 - 0.1 K/UL    ABS. IMM. GRANS. 0.1 (H) 0.00 - 0.04 K/UL    DF AUTOMATED     SAMPLES BEING HELD    Collection Time: 12/21/19  6:41 PM   Result Value Ref Range    SAMPLES BEING HELD 1RED 1BLUE     COMMENT        Add-on orders for these samples will be processed based on acceptable specimen integrity and analyte stability, which may vary by analyte.          Imaging:     Assessment:     Active Problems:    Closed left hip fracture ([facility] [address] (12/21/2019) Plan:     1. Closed left hip fracture: POA, due to mechanical fall. Patient is at least> 4 METS functional capacity and his therefore cleared for non cardiac surgery. Obtain baseline EKG. Ortho consulted  Gentle IVF hydration, PRN morphine for pain. H/o DM type 2: Initiate ISS as per protocol. Monitor closely for hypoglcyemia. Hold home metformin    ADAM: Suspect pre renal azotemia. Scr 1.67. baseline around 1.28. Continue gentle IVF hydration. Avoid nephrotoxic agents    DVT PPX: SCD    Dispo: Admit to ortho floor.     Surrogate decision maker; Daughter       Signed By: Lane Flores MD     December 22, 2019

## 2019-12-22 NOTE — PROGRESS NOTES
Pt seen and examined.   77y/o male with pmh of BPH, HTN, admitted for L hip fracture  - Plan for repair in OR tomorrow  - May eat today   - Hold metformin inpatient, and continue SSI, POCT glucose checks  - Hold lisinopril given ADAM, may resume if improved in the next few days  - IVF for now given ADAM  - PRN IV morphine, pain control, oral oxycodone, scheduled high dose tylenol  - Rest per Dr. Nohemi Olguin

## 2019-12-22 NOTE — CONSULTS
ORTHO CONSULT NOTE    Date of Consultation:  2019  Referring Physician:  Ganga Penaloza  CC: left hip pain    HPI:  Daniella Jimenes is a 78 y.o. male PMH dementia, HTN, DM, and vertigo who c/o left hip pain after falling out of chair at facility last night per patient. He denies open wound and foot numbness. Resides [place] and ambulates with walker. No home med blood thinners. Past Medical History:   Diagnosis Date    Diabetes ([facility] [address]     diet controlled    Hypertension     Meningioma ([facility] [address]     Vertigo       Past Surgical History:   Procedure Laterality Date    HX TONSILLECTOMY        History reviewed. No pertinent family history. Social History     Tobacco Use    Smoking status: Never Smoker    Smokeless tobacco: Never Used   Substance Use Topics    Alcohol use: No        Allergies   Allergen Reactions    Codeine Vertigo    Contrast Dye [Iodine] Unknown (comments)    Pcn [Penicillins] Hives        Review of Systems:  Per HPI. Objective:     Patient Vitals for the past 8 hrs:   BP Temp Pulse Resp SpO2   19 0851 147/87 99.4 °F (37.4 °C) 91 16 96 %   19 0550 145/78  86       Temp (24hrs), Av.2 °F (37.3 °C), Min:98.8 °F (37.1 °C), Max:99.5 °F (37.5 °C)      EXAM:   NAD. Lying in bed. No family present. Alert but not oriented to time. Moves BUE OK. Moves RLE some but increases pain left hip. Left hip skin intact. Resting with left hip externally rotated and knee flexed. Moves left ankle OK. Feet SILT and palp DP. Imaging Review:   Results from Hospital Encounter encounter on 19   XR KNEE LT 3 V    Narrative XR KNEE LT 3 V, XR HIP LT W OR WO PELV 2-3 VWS ,2019 7:41 PM  INDICATION:  Additional history: Hip pain after tripping and falling. COMPARISON: None  . FINDINGS:  HIP: 2 views  There is a subcapital femoral neck fracture with proximal migration of the femur  by approximately 1.8 cm  .   KNEE: 3 views  The bones, joints, and soft tissues of the knee are normal.  No joint effusion  is identified. Vascular calcifications. .    Impression IMPRESSION:  1. Subcapital femoral neck fracture. Labs:   Hgb 14.2, , INR 1.1. A1C 7.2. Impression:     Patient Active Problem List    Diagnosis Date Noted    Femoral neck fracture ([facility] [address] 12/22/2019    Closed left hip fracture ([facility] [address] 12/21/2019    TIA (transient ischemic attack) 05/28/2018    Dizziness and giddiness 05/26/2018    Benign positional vertigo 05/26/2018    Leukocytosis 10/12/2017    Generalized abdominal pain 10/12/2017    ADAM (acute kidney injury) ([facility] [address] 10/12/2017     Active Problems:    Closed left hip fracture ([facility] [address] (12/21/2019)      Femoral neck fracture ([facility] [address] (12/22/2019)        Plan:   I explained the nature of the injury and discussed the recommended surgery. Discussed potential risks/benefits of surgery and blood transfusions. Plan for left cinthia vs total hip arthroplasty. Posted for surgery 12/23 am Dr. Lucio Thorpe. Awaiting for arrival of daughter, Annis Lundborg ([phone]), from [facility] for consent. Medical clearance. Bedrest.  NPO p MN. Ice. SCDs. Dr. Lelan Blizzard is aware and agrees with above plan.     MARIELA Castro  Orthopedic Trauma Service  [address] [facility]

## 2019-12-22 NOTE — PROGRESS NOTES
Ortho:  Daughter arrived from [facility]. I met with daughter and patient with family friend (internist) on speaker phone. Detailed discussion of injury as well as risks/benefits/alternatives to hip surgery. Family/friend understand. Patient is confused and upset that no one told him this information earlier (I personally had similar conversation with him earlier today). Family will speak with patient and hope to proceed tomorrow as scheduled left hip cinthia-arthroplasty Dr. Ceci Street.     MARIELA Kelley

## 2019-12-22 NOTE — PROGRESS NOTES
Patient refusing 2 units of insulin for blood glucose check of 236. Called and reported to Dr. Oscar Marrufo. No orders at this time.

## 2019-12-22 NOTE — PROGRESS NOTES
Patient scheduled to potentially have surgery tomorrow. Spoke with Dr. Behzad Schmitt about ordering patient's home medications. No orders at this time.

## 2019-12-22 NOTE — ROUTINE PROCESS
Primary Nurse Shirin Gregory RN and Zulema Golden RN performed a dual skin assessment on this patient Impairment noted- see wound doc flow sheet Delvin score is 11 Abrasion on RLE.

## 2019-12-23 ENCOUNTER — ANESTHESIA EVENT (OUTPATIENT)
Dept: SURGERY | Age: 79
DRG: 470 | End: 2019-12-23
Payer: MEDICARE

## 2019-12-23 ENCOUNTER — ANESTHESIA (OUTPATIENT)
Dept: SURGERY | Age: 79
DRG: 470 | End: 2019-12-23
Payer: MEDICARE

## 2019-12-23 ENCOUNTER — APPOINTMENT (OUTPATIENT)
Dept: GENERAL RADIOLOGY | Age: 79
DRG: 470 | End: 2019-12-23
Attending: ORTHOPAEDIC SURGERY
Payer: MEDICARE

## 2019-12-23 LAB
ANION GAP SERPL CALC-SCNC: 7 MMOL/L (ref 5–15)
BASOPHILS # BLD: 0 K/UL (ref 0–0.1)
BASOPHILS NFR BLD: 0 % (ref 0–1)
BUN SERPL-MCNC: 19 MG/DL (ref 6–20)
BUN/CREAT SERPL: 15 (ref 12–20)
CALCIUM SERPL-MCNC: 8.7 MG/DL (ref 8.5–10.1)
CHLORIDE SERPL-SCNC: 107 MMOL/L (ref 97–108)
CO2 SERPL-SCNC: 23 MMOL/L (ref 21–32)
CREAT SERPL-MCNC: 1.29 MG/DL (ref 0.7–1.3)
DIFFERENTIAL METHOD BLD: ABNORMAL
EOSINOPHIL # BLD: 0.3 K/UL (ref 0–0.4)
EOSINOPHIL NFR BLD: 2 % (ref 0–7)
ERYTHROCYTE [DISTWIDTH] IN BLOOD BY AUTOMATED COUNT: 14.4 % (ref 11.5–14.5)
GLUCOSE BLD STRIP.AUTO-MCNC: 145 MG/DL (ref 65–100)
GLUCOSE BLD STRIP.AUTO-MCNC: 148 MG/DL (ref 65–100)
GLUCOSE BLD STRIP.AUTO-MCNC: 153 MG/DL (ref 65–100)
GLUCOSE BLD STRIP.AUTO-MCNC: 176 MG/DL (ref 65–100)
GLUCOSE SERPL-MCNC: 161 MG/DL (ref 65–100)
HCT VFR BLD AUTO: 41.1 % (ref 36.6–50.3)
HGB BLD-MCNC: 14.1 G/DL (ref 12.1–17)
IMM GRANULOCYTES # BLD AUTO: 0.1 K/UL (ref 0–0.04)
IMM GRANULOCYTES NFR BLD AUTO: 1 % (ref 0–0.5)
LYMPHOCYTES # BLD: 1.3 K/UL (ref 0.8–3.5)
LYMPHOCYTES NFR BLD: 8 % (ref 12–49)
MAGNESIUM SERPL-MCNC: 1.9 MG/DL (ref 1.6–2.4)
MCH RBC QN AUTO: 29.2 PG (ref 26–34)
MCHC RBC AUTO-ENTMCNC: 34.3 G/DL (ref 30–36.5)
MCV RBC AUTO: 85.1 FL (ref 80–99)
MONOCYTES # BLD: 1.1 K/UL (ref 0–1)
MONOCYTES NFR BLD: 7 % (ref 5–13)
NEUTS SEG # BLD: 12.7 K/UL (ref 1.8–8)
NEUTS SEG NFR BLD: 82 % (ref 32–75)
NRBC # BLD: 0 K/UL (ref 0–0.01)
NRBC BLD-RTO: 0 PER 100 WBC
PHOSPHATE SERPL-MCNC: 3.1 MG/DL (ref 2.6–4.7)
PLATELET # BLD AUTO: 323 K/UL (ref 150–400)
PMV BLD AUTO: 11.2 FL (ref 8.9–12.9)
POTASSIUM SERPL-SCNC: 4.3 MMOL/L (ref 3.5–5.1)
RBC # BLD AUTO: 4.83 M/UL (ref 4.1–5.7)
SERVICE CMNT-IMP: ABNORMAL
SODIUM SERPL-SCNC: 137 MMOL/L (ref 136–145)
WBC # BLD AUTO: 15.4 K/UL (ref 4.1–11.1)

## 2019-12-23 PROCEDURE — 74011250636 HC RX REV CODE- 250/636: Performed by: PHYSICIAN ASSISTANT

## 2019-12-23 PROCEDURE — 77030005513 HC CATH URETH FOL11 MDII -B: Performed by: ORTHOPAEDIC SURGERY

## 2019-12-23 PROCEDURE — 77030011264 HC ELECTRD BLD EXT COVD -A: Performed by: ORTHOPAEDIC SURGERY

## 2019-12-23 PROCEDURE — 74011250636 HC RX REV CODE- 250/636: Performed by: ORTHOPAEDIC SURGERY

## 2019-12-23 PROCEDURE — 77030026438 HC STYL ET INTUB CARD -A: Performed by: ANESTHESIOLOGY

## 2019-12-23 PROCEDURE — 77030002933 HC SUT MCRYL J&J -A: Performed by: ORTHOPAEDIC SURGERY

## 2019-12-23 PROCEDURE — 80048 BASIC METABOLIC PNL TOTAL CA: CPT

## 2019-12-23 PROCEDURE — 74011250636 HC RX REV CODE- 250/636: Performed by: NURSE ANESTHETIST, CERTIFIED REGISTERED

## 2019-12-23 PROCEDURE — 77030035236 HC SUT PDS STRATFX BARB J&J -B: Performed by: ORTHOPAEDIC SURGERY

## 2019-12-23 PROCEDURE — 83735 ASSAY OF MAGNESIUM: CPT

## 2019-12-23 PROCEDURE — 85025 COMPLETE CBC W/AUTO DIFF WBC: CPT

## 2019-12-23 PROCEDURE — 77030003666 HC NDL SPINAL BD -A: Performed by: ORTHOPAEDIC SURGERY

## 2019-12-23 PROCEDURE — 77030031139 HC SUT VCRL2 J&J -A: Performed by: ORTHOPAEDIC SURGERY

## 2019-12-23 PROCEDURE — C1776 JOINT DEVICE (IMPLANTABLE): HCPCS | Performed by: ORTHOPAEDIC SURGERY

## 2019-12-23 PROCEDURE — 73501 X-RAY EXAM HIP UNI 1 VIEW: CPT

## 2019-12-23 PROCEDURE — 77030018836 HC SOL IRR NACL ICUM -A: Performed by: ORTHOPAEDIC SURGERY

## 2019-12-23 PROCEDURE — 74011000250 HC RX REV CODE- 250: Performed by: ORTHOPAEDIC SURGERY

## 2019-12-23 PROCEDURE — 36415 COLL VENOUS BLD VENIPUNCTURE: CPT

## 2019-12-23 PROCEDURE — 77030020788: Performed by: ORTHOPAEDIC SURGERY

## 2019-12-23 PROCEDURE — 84100 ASSAY OF PHOSPHORUS: CPT

## 2019-12-23 PROCEDURE — 76210000006 HC OR PH I REC 0.5 TO 1 HR: Performed by: ORTHOPAEDIC SURGERY

## 2019-12-23 PROCEDURE — 74011000250 HC RX REV CODE- 250: Performed by: NURSE ANESTHETIST, CERTIFIED REGISTERED

## 2019-12-23 PROCEDURE — 82962 GLUCOSE BLOOD TEST: CPT

## 2019-12-23 PROCEDURE — 76010000162 HC OR TIME 1.5 TO 2 HR INTENSV-TIER 1: Performed by: ORTHOPAEDIC SURGERY

## 2019-12-23 PROCEDURE — 0SRS01A REPLACEMENT OF LEFT HIP JOINT, FEMORAL SURFACE WITH METAL SYNTHETIC SUBSTITUTE, UNCEMENTED, OPEN APPROACH: ICD-10-PCS | Performed by: ORTHOPAEDIC SURGERY

## 2019-12-23 PROCEDURE — 74011250636 HC RX REV CODE- 250/636: Performed by: ANESTHESIOLOGY

## 2019-12-23 PROCEDURE — 77030008684 HC TU ET CUF COVD -B: Performed by: ANESTHESIOLOGY

## 2019-12-23 PROCEDURE — 74011250637 HC RX REV CODE- 250/637: Performed by: INTERNAL MEDICINE

## 2019-12-23 PROCEDURE — 77030036660

## 2019-12-23 PROCEDURE — 76060000034 HC ANESTHESIA 1.5 TO 2 HR: Performed by: ORTHOPAEDIC SURGERY

## 2019-12-23 PROCEDURE — 77030006835 HC BLD SAW SAG STRY -B: Performed by: ORTHOPAEDIC SURGERY

## 2019-12-23 PROCEDURE — 77030027138 HC INCENT SPIROMETER -A

## 2019-12-23 PROCEDURE — 77030018846 HC SOL IRR STRL H20 ICUM -A: Performed by: ORTHOPAEDIC SURGERY

## 2019-12-23 PROCEDURE — 74011250637 HC RX REV CODE- 250/637: Performed by: PHYSICIAN ASSISTANT

## 2019-12-23 PROCEDURE — 77030011640 HC PAD GRND REM COVD -A: Performed by: ORTHOPAEDIC SURGERY

## 2019-12-23 PROCEDURE — 77030040361 HC SLV COMPR DVT MDII -B: Performed by: ORTHOPAEDIC SURGERY

## 2019-12-23 PROCEDURE — 77030010507 HC ADH SKN DERMBND J&J -B: Performed by: ORTHOPAEDIC SURGERY

## 2019-12-23 PROCEDURE — 77030040922 HC BLNKT HYPOTHRM STRY -A

## 2019-12-23 PROCEDURE — 77030012935 HC DRSG AQUACEL BMS -B: Performed by: ORTHOPAEDIC SURGERY

## 2019-12-23 PROCEDURE — 65270000029 HC RM PRIVATE

## 2019-12-23 DEVICE — SUMMIT FEMORAL STEM 12/14 TAPER TAPER ED W/POROCOAT SIZE 9 STD 165MM
Type: IMPLANTABLE DEVICE | Site: HIP | Status: FUNCTIONAL
Brand: SUMMIT POROCOAT

## 2019-12-23 DEVICE — STEM FEM PRSS FT HIP BPLR/UPLR CEM: Type: IMPLANTABLE DEVICE | Status: FUNCTIONAL

## 2019-12-23 DEVICE — SELF CENTERING BI-POLAR HEAD 28MM ID 56MM OD
Type: IMPLANTABLE DEVICE | Site: HIP | Status: FUNCTIONAL
Brand: SELF CENTERING

## 2019-12-23 DEVICE — ARTICUL/EZE FEMORAL HEAD DIAMETER 28MM +1.5 12/14 TAPER
Type: IMPLANTABLE DEVICE | Site: HIP | Status: FUNCTIONAL
Brand: ARTICUL/EZE

## 2019-12-23 RX ORDER — ONDANSETRON 4 MG/1
4 TABLET, ORALLY DISINTEGRATING ORAL
Status: DISCONTINUED | OUTPATIENT
Start: 2019-12-23 | End: 2019-12-28 | Stop reason: HOSPADM

## 2019-12-23 RX ORDER — OXYCODONE HYDROCHLORIDE 5 MG/1
5 TABLET ORAL
Status: DISCONTINUED | OUTPATIENT
Start: 2019-12-23 | End: 2019-12-28 | Stop reason: HOSPADM

## 2019-12-23 RX ORDER — HYDROMORPHONE HYDROCHLORIDE 1 MG/ML
0.2 INJECTION, SOLUTION INTRAMUSCULAR; INTRAVENOUS; SUBCUTANEOUS
Status: DISCONTINUED | OUTPATIENT
Start: 2019-12-23 | End: 2019-12-23 | Stop reason: HOSPADM

## 2019-12-23 RX ORDER — SODIUM CHLORIDE 9 MG/ML
125 INJECTION, SOLUTION INTRAVENOUS CONTINUOUS
Status: DISPENSED | OUTPATIENT
Start: 2019-12-23 | End: 2019-12-24

## 2019-12-23 RX ORDER — GLYCOPYRROLATE 0.2 MG/ML
INJECTION INTRAMUSCULAR; INTRAVENOUS AS NEEDED
Status: DISCONTINUED | OUTPATIENT
Start: 2019-12-23 | End: 2019-12-23 | Stop reason: HOSPADM

## 2019-12-23 RX ORDER — SODIUM CHLORIDE 0.9 % (FLUSH) 0.9 %
5-40 SYRINGE (ML) INJECTION AS NEEDED
Status: DISCONTINUED | OUTPATIENT
Start: 2019-12-23 | End: 2019-12-23 | Stop reason: HOSPADM

## 2019-12-23 RX ORDER — BUPIVACAINE HYDROCHLORIDE AND EPINEPHRINE 5; 5 MG/ML; UG/ML
INJECTION, SOLUTION EPIDURAL; INTRACAUDAL; PERINEURAL AS NEEDED
Status: DISCONTINUED | OUTPATIENT
Start: 2019-12-23 | End: 2019-12-23 | Stop reason: HOSPADM

## 2019-12-23 RX ORDER — VANCOMYCIN/0.9 % SOD CHLORIDE 1.5G/250ML
1500 PLASTIC BAG, INJECTION (ML) INTRAVENOUS ONCE
Status: COMPLETED | OUTPATIENT
Start: 2019-12-23 | End: 2019-12-23

## 2019-12-23 RX ORDER — NEOSTIGMINE METHYLSULFATE 1 MG/ML
INJECTION INTRAVENOUS AS NEEDED
Status: DISCONTINUED | OUTPATIENT
Start: 2019-12-23 | End: 2019-12-23 | Stop reason: HOSPADM

## 2019-12-23 RX ORDER — LIDOCAINE HYDROCHLORIDE 20 MG/ML
INJECTION, SOLUTION EPIDURAL; INFILTRATION; INTRACAUDAL; PERINEURAL AS NEEDED
Status: DISCONTINUED | OUTPATIENT
Start: 2019-12-23 | End: 2019-12-23 | Stop reason: HOSPADM

## 2019-12-23 RX ORDER — FENTANYL CITRATE 50 UG/ML
50 INJECTION, SOLUTION INTRAMUSCULAR; INTRAVENOUS AS NEEDED
Status: DISCONTINUED | OUTPATIENT
Start: 2019-12-23 | End: 2019-12-23 | Stop reason: HOSPADM

## 2019-12-23 RX ORDER — ROPIVACAINE HYDROCHLORIDE 5 MG/ML
30 INJECTION, SOLUTION EPIDURAL; INFILTRATION; PERINEURAL AS NEEDED
Status: DISCONTINUED | OUTPATIENT
Start: 2019-12-23 | End: 2019-12-23 | Stop reason: HOSPADM

## 2019-12-23 RX ORDER — ACETAMINOPHEN 325 MG/1
650 TABLET ORAL ONCE
Status: DISCONTINUED | OUTPATIENT
Start: 2019-12-23 | End: 2019-12-23 | Stop reason: HOSPADM

## 2019-12-23 RX ORDER — LIDOCAINE HYDROCHLORIDE 10 MG/ML
0.1 INJECTION, SOLUTION EPIDURAL; INFILTRATION; INTRACAUDAL; PERINEURAL AS NEEDED
Status: DISCONTINUED | OUTPATIENT
Start: 2019-12-23 | End: 2019-12-23 | Stop reason: HOSPADM

## 2019-12-23 RX ORDER — NALOXONE HYDROCHLORIDE 0.4 MG/ML
0.4 INJECTION, SOLUTION INTRAMUSCULAR; INTRAVENOUS; SUBCUTANEOUS AS NEEDED
Status: DISCONTINUED | OUTPATIENT
Start: 2019-12-23 | End: 2019-12-28 | Stop reason: HOSPADM

## 2019-12-23 RX ORDER — MIDAZOLAM HYDROCHLORIDE 1 MG/ML
0.5 INJECTION, SOLUTION INTRAMUSCULAR; INTRAVENOUS
Status: DISCONTINUED | OUTPATIENT
Start: 2019-12-23 | End: 2019-12-23 | Stop reason: HOSPADM

## 2019-12-23 RX ORDER — ONDANSETRON 2 MG/ML
INJECTION INTRAMUSCULAR; INTRAVENOUS AS NEEDED
Status: DISCONTINUED | OUTPATIENT
Start: 2019-12-23 | End: 2019-12-23 | Stop reason: HOSPADM

## 2019-12-23 RX ORDER — ONDANSETRON 2 MG/ML
4 INJECTION INTRAMUSCULAR; INTRAVENOUS
Status: ACTIVE | OUTPATIENT
Start: 2019-12-23 | End: 2019-12-24

## 2019-12-23 RX ORDER — ONDANSETRON 2 MG/ML
4 INJECTION INTRAMUSCULAR; INTRAVENOUS AS NEEDED
Status: DISCONTINUED | OUTPATIENT
Start: 2019-12-23 | End: 2019-12-23 | Stop reason: HOSPADM

## 2019-12-23 RX ORDER — ENOXAPARIN SODIUM 100 MG/ML
40 INJECTION SUBCUTANEOUS DAILY
Status: DISCONTINUED | OUTPATIENT
Start: 2019-12-24 | End: 2019-12-28 | Stop reason: HOSPADM

## 2019-12-23 RX ORDER — SODIUM CHLORIDE, SODIUM LACTATE, POTASSIUM CHLORIDE, CALCIUM CHLORIDE 600; 310; 30; 20 MG/100ML; MG/100ML; MG/100ML; MG/100ML
100 INJECTION, SOLUTION INTRAVENOUS CONTINUOUS
Status: DISCONTINUED | OUTPATIENT
Start: 2019-12-23 | End: 2019-12-23 | Stop reason: HOSPADM

## 2019-12-23 RX ORDER — MORPHINE SULFATE 10 MG/ML
2 INJECTION, SOLUTION INTRAMUSCULAR; INTRAVENOUS
Status: DISCONTINUED | OUTPATIENT
Start: 2019-12-23 | End: 2019-12-23 | Stop reason: HOSPADM

## 2019-12-23 RX ORDER — FENTANYL CITRATE 50 UG/ML
INJECTION, SOLUTION INTRAMUSCULAR; INTRAVENOUS AS NEEDED
Status: DISCONTINUED | OUTPATIENT
Start: 2019-12-23 | End: 2019-12-23 | Stop reason: HOSPADM

## 2019-12-23 RX ORDER — POLYETHYLENE GLYCOL 3350 17 G/17G
17 POWDER, FOR SOLUTION ORAL DAILY
Status: DISCONTINUED | OUTPATIENT
Start: 2019-12-24 | End: 2019-12-28 | Stop reason: HOSPADM

## 2019-12-23 RX ORDER — OXYCODONE HYDROCHLORIDE 5 MG/1
5 TABLET ORAL AS NEEDED
Status: DISCONTINUED | OUTPATIENT
Start: 2019-12-23 | End: 2019-12-23 | Stop reason: HOSPADM

## 2019-12-23 RX ORDER — SODIUM CHLORIDE 0.9 % (FLUSH) 0.9 %
5-40 SYRINGE (ML) INJECTION EVERY 8 HOURS
Status: DISCONTINUED | OUTPATIENT
Start: 2019-12-23 | End: 2019-12-23 | Stop reason: HOSPADM

## 2019-12-23 RX ORDER — SODIUM CHLORIDE 0.9 % (FLUSH) 0.9 %
5-40 SYRINGE (ML) INJECTION AS NEEDED
Status: DISCONTINUED | OUTPATIENT
Start: 2019-12-23 | End: 2019-12-28 | Stop reason: HOSPADM

## 2019-12-23 RX ORDER — ACETAMINOPHEN 325 MG/1
650 TABLET ORAL EVERY 6 HOURS
Status: DISCONTINUED | OUTPATIENT
Start: 2019-12-23 | End: 2019-12-28 | Stop reason: HOSPADM

## 2019-12-23 RX ORDER — MIDAZOLAM HYDROCHLORIDE 1 MG/ML
1 INJECTION, SOLUTION INTRAMUSCULAR; INTRAVENOUS AS NEEDED
Status: DISCONTINUED | OUTPATIENT
Start: 2019-12-23 | End: 2019-12-23 | Stop reason: HOSPADM

## 2019-12-23 RX ORDER — AMOXICILLIN 250 MG
1 CAPSULE ORAL 2 TIMES DAILY
Status: DISCONTINUED | OUTPATIENT
Start: 2019-12-23 | End: 2019-12-28 | Stop reason: HOSPADM

## 2019-12-23 RX ORDER — DIPHENHYDRAMINE HYDROCHLORIDE 50 MG/ML
12.5 INJECTION, SOLUTION INTRAMUSCULAR; INTRAVENOUS AS NEEDED
Status: DISCONTINUED | OUTPATIENT
Start: 2019-12-23 | End: 2019-12-23 | Stop reason: HOSPADM

## 2019-12-23 RX ORDER — CEFAZOLIN SODIUM 1 G/3ML
INJECTION, POWDER, FOR SOLUTION INTRAMUSCULAR; INTRAVENOUS AS NEEDED
Status: DISCONTINUED | OUTPATIENT
Start: 2019-12-23 | End: 2019-12-23

## 2019-12-23 RX ORDER — FACIAL-BODY WIPES
10 EACH TOPICAL DAILY PRN
Status: DISCONTINUED | OUTPATIENT
Start: 2019-12-25 | End: 2019-12-28 | Stop reason: HOSPADM

## 2019-12-23 RX ORDER — PROPOFOL 10 MG/ML
INJECTION, EMULSION INTRAVENOUS AS NEEDED
Status: DISCONTINUED | OUTPATIENT
Start: 2019-12-23 | End: 2019-12-23 | Stop reason: HOSPADM

## 2019-12-23 RX ORDER — ROCURONIUM BROMIDE 10 MG/ML
INJECTION, SOLUTION INTRAVENOUS AS NEEDED
Status: DISCONTINUED | OUTPATIENT
Start: 2019-12-23 | End: 2019-12-23 | Stop reason: HOSPADM

## 2019-12-23 RX ORDER — SODIUM CHLORIDE 9 MG/ML
25 INJECTION, SOLUTION INTRAVENOUS CONTINUOUS
Status: DISCONTINUED | OUTPATIENT
Start: 2019-12-23 | End: 2019-12-23 | Stop reason: HOSPADM

## 2019-12-23 RX ORDER — SODIUM CHLORIDE, SODIUM LACTATE, POTASSIUM CHLORIDE, CALCIUM CHLORIDE 600; 310; 30; 20 MG/100ML; MG/100ML; MG/100ML; MG/100ML
25 INJECTION, SOLUTION INTRAVENOUS CONTINUOUS
Status: DISCONTINUED | OUTPATIENT
Start: 2019-12-23 | End: 2019-12-23 | Stop reason: HOSPADM

## 2019-12-23 RX ORDER — OXYCODONE HYDROCHLORIDE 5 MG/1
2.5 TABLET ORAL
Status: DISCONTINUED | OUTPATIENT
Start: 2019-12-23 | End: 2019-12-28 | Stop reason: HOSPADM

## 2019-12-23 RX ORDER — FENTANYL CITRATE 50 UG/ML
25 INJECTION, SOLUTION INTRAMUSCULAR; INTRAVENOUS
Status: DISCONTINUED | OUTPATIENT
Start: 2019-12-23 | End: 2019-12-23 | Stop reason: HOSPADM

## 2019-12-23 RX ORDER — SODIUM CHLORIDE 0.9 % (FLUSH) 0.9 %
5-40 SYRINGE (ML) INJECTION EVERY 8 HOURS
Status: DISCONTINUED | OUTPATIENT
Start: 2019-12-23 | End: 2019-12-28 | Stop reason: HOSPADM

## 2019-12-23 RX ORDER — SODIUM CHLORIDE, SODIUM LACTATE, POTASSIUM CHLORIDE, CALCIUM CHLORIDE 600; 310; 30; 20 MG/100ML; MG/100ML; MG/100ML; MG/100ML
INJECTION, SOLUTION INTRAVENOUS
Status: DISCONTINUED | OUTPATIENT
Start: 2019-12-23 | End: 2019-12-23 | Stop reason: HOSPADM

## 2019-12-23 RX ORDER — SUCCINYLCHOLINE CHLORIDE 20 MG/ML
INJECTION INTRAMUSCULAR; INTRAVENOUS AS NEEDED
Status: DISCONTINUED | OUTPATIENT
Start: 2019-12-23 | End: 2019-12-23 | Stop reason: HOSPADM

## 2019-12-23 RX ORDER — FERROUS SULFATE, DRIED 160(50) MG
1 TABLET, EXTENDED RELEASE ORAL
Status: DISCONTINUED | OUTPATIENT
Start: 2019-12-24 | End: 2019-12-28 | Stop reason: HOSPADM

## 2019-12-23 RX ADMIN — FENTANYL CITRATE 75 MCG: 50 INJECTION, SOLUTION INTRAMUSCULAR; INTRAVENOUS at 13:52

## 2019-12-23 RX ADMIN — ACETAMINOPHEN 650 MG: 325 TABLET ORAL at 18:00

## 2019-12-23 RX ADMIN — OXYCODONE 5 MG: 5 TABLET ORAL at 20:14

## 2019-12-23 RX ADMIN — NEOSTIGMINE METHYLSULFATE 2 MG: 1 INJECTION, SOLUTION INTRAMUSCULAR; INTRAVENOUS; SUBCUTANEOUS at 14:46

## 2019-12-23 RX ADMIN — SODIUM CHLORIDE, POTASSIUM CHLORIDE, SODIUM LACTATE AND CALCIUM CHLORIDE: 600; 310; 30; 20 INJECTION, SOLUTION INTRAVENOUS at 12:50

## 2019-12-23 RX ADMIN — SUCCINYLCHOLINE CHLORIDE 160 MG: 20 INJECTION, SOLUTION INTRAMUSCULAR; INTRAVENOUS at 13:42

## 2019-12-23 RX ADMIN — FENTANYL CITRATE 25 MCG: 50 INJECTION, SOLUTION INTRAMUSCULAR; INTRAVENOUS at 12:54

## 2019-12-23 RX ADMIN — Medication 10 ML: at 08:22

## 2019-12-23 RX ADMIN — ACETAMINOPHEN 650 MG: 325 TABLET ORAL at 23:21

## 2019-12-23 RX ADMIN — SENNOSIDES AND DOCUSATE SODIUM 1 TABLET: 8.6; 5 TABLET ORAL at 18:00

## 2019-12-23 RX ADMIN — ROCURONIUM BROMIDE 25 MG: 10 SOLUTION INTRAVENOUS at 13:53

## 2019-12-23 RX ADMIN — ACETAMINOPHEN 1000 MG: 500 TABLET ORAL at 08:21

## 2019-12-23 RX ADMIN — GLYCOPYRROLATE 0.4 MG: 0.2 INJECTION, SOLUTION INTRAMUSCULAR; INTRAVENOUS at 14:46

## 2019-12-23 RX ADMIN — SODIUM CHLORIDE 125 ML/HR: 900 INJECTION, SOLUTION INTRAVENOUS at 16:00

## 2019-12-23 RX ADMIN — VANCOMYCIN HYDROCHLORIDE 1500 MG: 10 INJECTION, POWDER, LYOPHILIZED, FOR SOLUTION INTRAVENOUS at 12:38

## 2019-12-23 RX ADMIN — ROCURONIUM BROMIDE 5 MG: 10 SOLUTION INTRAVENOUS at 13:42

## 2019-12-23 RX ADMIN — PROPOFOL 80 MG: 10 INJECTION, EMULSION INTRAVENOUS at 13:42

## 2019-12-23 RX ADMIN — SODIUM CHLORIDE, POTASSIUM CHLORIDE, SODIUM LACTATE AND CALCIUM CHLORIDE: 600; 310; 30; 20 INJECTION, SOLUTION INTRAVENOUS at 15:14

## 2019-12-23 RX ADMIN — ONDANSETRON HYDROCHLORIDE 4 MG: 2 INJECTION, SOLUTION INTRAMUSCULAR; INTRAVENOUS at 14:20

## 2019-12-23 RX ADMIN — LIDOCAINE HYDROCHLORIDE 100 MG: 20 INJECTION, SOLUTION EPIDURAL; INFILTRATION; INTRACAUDAL; PERINEURAL at 13:42

## 2019-12-23 RX ADMIN — FENTANYL CITRATE 100 MCG: 50 INJECTION, SOLUTION INTRAMUSCULAR; INTRAVENOUS at 14:21

## 2019-12-23 RX ADMIN — HYDRALAZINE HYDROCHLORIDE 10 MG: 20 INJECTION INTRAMUSCULAR; INTRAVENOUS at 23:30

## 2019-12-23 RX ADMIN — FENTANYL CITRATE 25 MCG: 50 INJECTION, SOLUTION INTRAMUSCULAR; INTRAVENOUS at 13:42

## 2019-12-23 NOTE — PERIOP NOTES
TRANSFER - OUT REPORT:    Verbal report given to Gale(name) on [facility]  being transferred to (unit) for routine post - op       Report consisted of patients Situation, Background, Assessment and   Recommendations(SBAR). Time Pre op antibiotic given:1238  Anesthesia Stop time: 5959  Granados Present on Transfer to floor:no  Order for Granados on Chart:no  Discharge Prescriptions with Chart:no    Information from the following report(s) SBAR, OR Summary, MAR and Cardiac Rhythm nsr was reviewed with the receiving nurse. Opportunity for questions and clarification was provided. Is the patient on 02? NO         Is the patient on a monitor? NO    Is the nurse transporting with the patient? NO    Surgical Waiting Area notified of patient's transfer from PACU?  YES      The following personal items collected during your admission accompanied patient upon transfer:   Dental Appliance: Dental Appliances: None  Vision: Visual Aid: None  Hearing Aid:    Jewelry:    Clothing:    Other Valuables:    Valuables sent to safe:

## 2019-12-23 NOTE — PROGRESS NOTES
TRANSFER - OUT REPORT:    Verbal report given to Radha(name) on [facility]  being transferred to Pre-op(unit) for ordered procedure       Report consisted of patients Situation, Background, Assessment and   Recommendations(SBAR). Information from the following report(s) SBAR and Kardex was reviewed with the receiving nurse. Lines:   Peripheral IV 12/21/19 Right Antecubital (Active)   Site Assessment Clean, dry, & intact 12/22/2019  8:00 AM   Phlebitis Assessment 0 12/22/2019  8:00 AM   Infiltration Assessment 0 12/22/2019  8:00 AM   Dressing Status Clean, dry, & intact 12/22/2019  8:00 AM   Dressing Type Transparent 12/22/2019  8:00 AM   Hub Color/Line Status Infusing 12/22/2019  8:00 AM   Action Taken Blood drawn 12/21/2019  6:45 PM        Opportunity for questions and clarification was provided.       Patient transported with:   [employer]

## 2019-12-23 NOTE — ANESTHESIA PREPROCEDURE EVALUATION
Relevant Problems   No relevant active problems       Anesthetic History   No history of anesthetic complications            Review of Systems / Medical History  Patient summary reviewed, nursing notes reviewed and pertinent labs reviewed    Pulmonary  Within defined limits                 Neuro/Psych         TIA     Cardiovascular    Hypertension              Exercise tolerance: >4 METS     GI/Hepatic/Renal         Renal disease: CRI       Endo/Other    Diabetes         Other Findings   Comments: Hip fracture           Physical Exam    Airway  Mallampati: II  TM Distance: 4 - 6 cm  Neck ROM: normal range of motion   Mouth opening: Normal     Cardiovascular  Regular rate and rhythm,  S1 and S2 normal,  no murmur, click, rub, or gallop             Dental    Dentition: Caps/crowns     Pulmonary  Breath sounds clear to auscultation               Abdominal  GI exam deferred       Other Findings            Anesthetic Plan    ASA: 3  Anesthesia type: general          Induction: Intravenous  Anesthetic plan and risks discussed with: Patient

## 2019-12-23 NOTE — PROGRESS NOTES
Primary Nurse Sal Jordan RN and Mag RN performed a dual skin assessment on this patient No impairment noted  Delvin score is 14

## 2019-12-23 NOTE — PROGRESS NOTES
ORTHO FRACTURE PROGRESS NOTE    2019  Admit Date:   2019    Post Op day: * No surgery date entered *    Subjective:    Juve Graham is resting comfortably at this time. No family present. Awaiting surgery today with Dr Minerva Wilson.   NPO at this time       PT/OT:   Gait:                    Vital Signs:    Patient Vitals for the past 8 hrs:   BP Temp Pulse Resp SpO2   19 0802 151/82 98.1 °F (36.7 °C) 100 18 96 %   19 0204 139/79 99.4 °F (37.4 °C) 87 16 94 %     Temp (24hrs), Av °F (37.2 °C), Min:98.1 °F (36.7 °C), Max:99.5 °F (37.5 °C)      Pain Control:   Pain Assessment  Pain Scale 1: Numeric (0 - 10)  Pain Intensity 1: 3  Pain Onset 1: PTA   Pain Location 1: Hip  Pain Orientation 1: Left  Pain Description 1: Aching  Pain Intervention(s) 1: Rest    Meds:    Current Facility-Administered Medications   Medication Dose Route Frequency    sodium chloride (NS) flush 5-40 mL  5-40 mL IntraVENous Q8H    sodium chloride (NS) flush 5-40 mL  5-40 mL IntraVENous PRN    naloxone (NARCAN) injection 0.4 mg  0.4 mg IntraVENous PRN    hydrALAZINE (APRESOLINE) 20 mg/mL injection 10 mg  10 mg IntraVENous Q6H PRN    glucose chewable tablet 16 g  4 Tab Oral PRN    glucagon (GLUCAGEN) injection 1 mg  1 mg IntraMUSCular PRN    insulin lispro (HUMALOG) injection   SubCUTAneous Q6H    dextrose 10 % infusion 125-250 mL  125-250 mL IntraVENous PRN    tamsulosin (FLOMAX) capsule 0.4 mg  0.4 mg Oral DAILY    oxyCODONE IR (ROXICODONE) tablet 5 mg  5 mg Oral Q4H PRN    acetaminophen (TYLENOL) tablet 1,000 mg  1,000 mg Oral Q8H    acetaminophen (TYLENOL) tablet 650 mg  650 mg Oral Q4H PRN    lactated Ringers infusion  75 mL/hr IntraVENous CONTINUOUS    morphine injection 2 mg  2 mg IntraVENous Q4H PRN       LAB:    Recent Labs     19  0642 19  0531   HCT 41.1 43.1   HGB 14.1 14.2   INR  --  1.1       Transfuse PRBC's:      Assessment & Physician's Comment:  Asleep  Left leg slightly shortened compared to the right  Moves spontaneously    Active Problems:    Closed left hip fracture ([facility] [address] (12/21/2019)      Femoral neck fracture ([address] (12/22/2019)        Plan:    NPO  Bed rest  Dr Tania Muñoz to meet with family prior to consent signing  To OR later today for bipolar    Carla Johnson PA-C   Orthopedic Trauma Service  [address] [facility]    Ortho Attending  Chart reviewed, xrays reviewed, met with patient and daughter  Discussed xrays, treatment options. Went over hemiarthroplasty with them including risks and complications, expected outcomes and post op recovery. They agree to proceed.

## 2019-12-23 NOTE — ROUTINE PROCESS
TRANSFER - IN REPORT: 
 
Verbal report received from Susana on Major Fink  being received from [address] [place] [address](unit) for ordered procedure Report consisted of patients Situation, Background, Assessment and  
Recommendations(SBAR). Information from the following report(s) SBAR was reviewed with the receiving nurse. Opportunity for questions and clarification was provided. Assessment completed upon patients arrival to unit and care assumed.

## 2019-12-23 NOTE — BRIEF OP NOTE
BRIEF OPERATIVE NOTE    Date of Procedure: 12/23/2019   Preoperative Diagnosis: Left hip femoral neck fracture  Postoperative Diagnosis: Left hip femoral neck fracture    Procedure(s):  LEFT HIP HEMIARTHROPLASTY, ANTERIOR APPROACH  Surgeon(s) and Role:     * Dayday Cabrera MD - Primary         Surgical Assistant: none    Surgical Staff:  Circ-1: Sade Romo RN  Scrub Tech-1: Ya Fuentes  Surg Asst-1: Dollene Cowden  Surg Asst-2: Park Beltran Staff: Ariela Burks RN; Nehemias Gudino RN  Event Time In Time Out   Incision Start 12/23/2019 1408    Incision Close       Anesthesia: General   Estimated Blood Loss: 200cc  Specimens: * No specimens in log *   Findings: as above   Complications: none  Implants:   Implant Name Type Inv.  Item Serial No.  Lot No. LRB No. Used Action   HEAD FEM SLF-CENTER [id number] ALEXIS --  - SN/A Joint Component HEAD FEM SLF-CENTER 56X28 ALEXIS --  N/A [facility] [employer] [id number] Left 1 Implanted   HEAD FEM +1.5 25MM 12/14 [place] - SN/A Joint Component HEAD FEM +1.5 25MM 12/14 [employer] N/A [facility] [employer] [id number] Left 1 Implanted   STEM FEM POR TAPR 9 -- SUMMIT - SN/A Joint Component STEM FEM POR TAPR 9 -- SUMMIT N/A [facility] [employer] [id number] Left 1 Implanted

## 2019-12-23 NOTE — PERIOP NOTES
Patient: Faith Wakefield MRN: [id number]  SSN: [id number]   YOB: 1940  Age: 78 y.o. Sex: male     Patient is status post Procedure(s):  HIP ARTHROPLASTY TOTAL ANTERIOR APPROACH. Surgeon(s) and Role:     * Teodoro Kiran MD - Primary    Local/Dose/Irrigation:  MARCAINE 0.5% WITH EPI 1:120,000 30ML                Peripheral IV 12/21/19 Right Antecubital (Active)   Site Assessment Clean, dry, & intact 12/22/2019  8:00 AM   Phlebitis Assessment 0 12/22/2019  8:00 AM   Infiltration Assessment 0 12/22/2019  8:00 AM   Dressing Status Clean, dry, & intact 12/22/2019  8:00 AM   Dressing Type Transparent 12/22/2019  8:00 AM   Hub Color/Line Status Infusing 12/22/2019  8:00 AM   Action Taken Blood drawn 12/21/2019  6:45 PM            Airway - Endotracheal Tube 12/23/19 (Active)                   Dressing/Packing:  Wound Hip Left-Dressing Type: Aquacel; Topical skin adhesive/glue(dERMABOND) (12/23/19 3909)    Splint/Cast:  ]    Other:  STRAIGHT CATH PRIOR TO LEAVING OR - 325ML OUT

## 2019-12-23 NOTE — PROGRESS NOTES
Spiritual Care Assessment/Progress Note  [facility]      NAME: Ryan Rg      MRN: [id number]  AGE: 78 y.o. SEX: male  Mu-ism Affiliation: Shinto   Language: English     12/23/2019     Total Time (in minutes): 28     Spiritual Assessment begun in [address] [address] [facility] through conversation with:         [x]Patient        [x] Family    [] Friend(s)        Reason for Consult: Advance medical directive consult     Spiritual beliefs: (Please include comment if needed)     [x] Identifies with a rebecca tradition: Shinto       [] Supported by a rebecca community:            [] Claims no spiritual orientation:           [] Seeking spiritual identity:                [] Adheres to an individual form of spirituality:           [] Not able to assess:                           Identified resources for coping:      [x] Prayer                               [] Music                  [] Guided Imagery     [x] Family/friends                 [] Pet visits     [] Devotional reading                         [] Unknown     [] Other:                                               Interventions offered during this visit: (See comments for more details)    Patient Interventions: Affirmation of emotions/emotional suffering, Advance medical directive consult, Initial/Spiritual assessment, patient floor     Family/Friend(s):  Affirmation of emotions/emotional suffering, Catharsis/review of pertinent events in supportive environment, Initial Assessment     Plan of Care:     [x] Support spiritual and/or cultural needs    [x] Support AMD and/or advance care planning process      [] Support grieving process   [] Coordinate Rites and/or Rituals    [] Coordination with community clergy   [] No spiritual needs identified at this time   [] Detailed Plan of Care below (See Comments)  [] Make referral to Music Therapy  [] Make referral to Pet Therapy     [] Make referral to Addiction services  [] Make referral to [facility]  [] Make referral to Spiritual Care Partner  [] No future visits requested        [x] Follow up visits as needed     Comments: Received request that Mr Lazo's (room 12) daughter would like for patient to complete a new AMD. It was noted in patient's chart that he had dementia. It was also noted that patient already had an AMD on record. Mr Mihir Herman was lying quietly in bed and appeared in good spirits; his daughter, Marleni Stapleton, was at his bedside when  arrived. Explained to Marleni Stapleton that since MD's note stated patient had dementia, it would not be appropriate to have him complete a new AMD. Daughter expressed her concerns that patient would refuse critically needed medical treatment and she felt he would not be mentally competent to make such a decision; she wanted his MPOA's to be able to override such decisions should they occur. Cydney Tai to have physician state in his/her notes as to the patient's mental competency and if physician deemed Mr Mihir Herman incapable of making an informed decision, then his MPOA could act on his behalf. Patient shared that he was [place] and that Kennedy was a enmanuel. They expressed appreciation for 's support. : Rev. Dorothy Werner.  Dilip Baptiste; [facility], to contact [phone] [address] call: [phone]

## 2019-12-23 NOTE — PROGRESS NOTES
[facility]  Hospitalist Group                                                                                          Hospitalist Progress Note  Ange Stack MD  Answering service: [phone] OR [phone] from in house phone        Date of Service:  2019  NAME:  Bhavna Ang  :  1940  MRN:  [id number]      Admission Summary:   Bhavna Ang is a 78 y.o. male with past medical history of hypertension, diabetes mellitus who [facility] on account of closed fracture of the neck of left femur.     Interval history / Subjective:   Pleasantly confused, plan for OR today      Assessment & Plan:     L hip fracture  - Ortho following, to OR today for repair  - DVT PPX per orthopedics  - PRN IV morphine and PO oxycodone, high dose scheduled tylenol for pain  - PT/OT post op    Tyep 2 diabetes - A1c 7.2%  - SSI, POCT glucose checks and hypoglycemia  - Hold metformin while inaptient     Elevated Cr and dehydration on CKD stage 3 - Cr on admit 1.66, improved  - I and O   - Monitor BMP daily  - DC IVF today     BPH - home tamsulosin     Code status: FULL  DVT prophylaxis: Per Ortho    Care Plan discussed with: Patient/Family  Disposition: TBD   POA - daughters. Hospital Problems  Date Reviewed: 2019          Codes Class Noted POA    * (Principal) Femoral neck fracture ([facility] [address] ICD-10-CM: S72.009A  ICD-9-CM: 820.8  2019 Yes        Closed left hip fracture [facility]) ICD-10-CM: V40.660C  ICD-9-CM: 820.8  2019 Unknown                Review of Systems:   A comprehensive review of systems was negative except for that written in the HPI. Vital Signs:    Last 24hrs VS reviewed since prior progress note.  Most recent are:  Visit Vitals  BP (!) 167/93 (BP 1 Location: Left arm, BP Patient Position: At rest)   Pulse 100   Temp 98.7 °F (37.1 °C)   Resp 18   Ht 5' 11\" (1.803 m)   Wt 85.9 kg (189 lb 6 oz)   SpO2 93%   BMI 26.41 kg/m²         Intake/Output Summary (Last 24 hours) at 12/23/2019 1725  Last data filed at 12/23/2019 1514  Gross per 24 hour   Intake 1000 ml   Output 625 ml   Net 375 ml        Physical Examination:             Constitutional:  No acute distress, cooperative, pleasant    ENT:  Oral mucous moist, oropharynx benign. Resp:  CTA bilaterally. No wheezing/rhonchi/rales. No accessory muscle use   CV:  Regular rhythm, normal rate, no murmurs, gallops, rubs    GI:  Soft, non distended, non tender. normoactive bowel sounds, no hepatosplenomegaly     Musculoskeletal:  No edema, warm, 2+ pulses throughout    Neurologic:  Moves all extremities. AAOx1 (self), L hip elevated      Skin:  Good turgor, no rashes or ulcers       Data Review:    Review and/or order of clinical lab test  Review and/or order of tests in the radiology section of CPT  Review and/or order of tests in the medicine section of CPT      Labs:     Recent Labs     12/23/19  0642 12/22/19  0531   WBC 15.4* 18.3*   HGB 14.1 14.2   HCT 41.1 43.1    390     Recent Labs     12/23/19  0642 12/22/19  0531 12/21/19  1841    137 138   K 4.3 4.4 4.8    105 101   CO2 23 27 26   BUN 19 23* 27*   CREA 1.29 1.59* 1.67*   * 155* 180*   CA 8.7 9.1 9.0   MG 1.9  --   --    PHOS 3.1  --   --      Recent Labs     12/22/19  0531 12/21/19  1841   SGOT 13* 16   ALT 23 26   AP 92 88   TBILI 1.0 0.5   TP 7.0 6.9   ALB 3.9 3.8   GLOB 3.1 3.1     Recent Labs     12/22/19  0531   INR 1.1   PTP 10.7      No results for input(s): FE, TIBC, PSAT, FERR in the last 72 hours. No results found for: FOL, RBCF   No results for input(s): PH, PCO2, PO2 in the last 72 hours. No results for input(s): CPK, CKNDX, TROIQ in the last 72 hours.     No lab exists for component: CPKMB  No results found for: CHOL, CHOLX, CHLST, CHOLV, HDL, HDLP, LDL, LDLC, DLDLP, TGLX, TRIGL, TRIGP, CHHD, CHHDX  Lab Results   Component Value Date/Time    Glucose (POC) 145 (H) 12/23/2019 03:29 PM    Glucose (POC) 148 (H) 12/23/2019 06:48 AM    Glucose (POC) 240 (H) 12/22/2019 09:05 PM    Glucose (POC) 236 (H) 12/22/2019 04:13 PM    Glucose (POC) 137 (H) 12/22/2019 11:29 AM     Lab Results   Component Value Date/Time    Color YELLOW/STRAW 10/11/2017 11:45 PM    Appearance CLEAR 10/11/2017 11:45 PM    Specific gravity 1.027 10/11/2017 11:45 PM    pH (UA) 5.5 10/11/2017 11:45 PM    Protein 100 (A) 10/11/2017 11:45 PM    Glucose NEGATIVE  10/11/2017 11:45 PM    Ketone 40 (A) 10/11/2017 11:45 PM    Bilirubin NEGATIVE  10/11/2017 11:45 PM    Urobilinogen 0.2 10/11/2017 11:45 PM    Nitrites NEGATIVE  10/11/2017 11:45 PM    Leukocyte Esterase TRACE (A) 10/11/2017 11:45 PM    Epithelial cells FEW 10/11/2017 11:45 PM    Bacteria NEGATIVE  10/11/2017 11:45 PM    WBC 0-4 10/11/2017 11:45 PM    RBC 0-5 10/11/2017 11:45 PM         Medications Reviewed:     Current Facility-Administered Medications   Medication Dose Route Frequency    0.9% sodium chloride infusion  125 mL/hr IntraVENous CONTINUOUS    sodium chloride (NS) flush 5-40 mL  5-40 mL IntraVENous Q8H    sodium chloride (NS) flush 5-40 mL  5-40 mL IntraVENous PRN    acetaminophen (TYLENOL) tablet 650 mg  650 mg Oral Q6H    oxyCODONE IR (ROXICODONE) tablet 2.5 mg  2.5 mg Oral Q4H PRN    oxyCODONE IR (ROXICODONE) tablet 5 mg  5 mg Oral Q4H PRN    naloxone (NARCAN) injection 0.4 mg  0.4 mg IntraVENous PRN    ondansetron (ZOFRAN ODT) tablet 4 mg  4 mg Oral Q8H PRN    ondansetron (ZOFRAN) injection 4 mg  4 mg IntraVENous Q8H PRN    [START ON 12/24/2019] calcium-vitamin D (OS-NICKO) 500 mg-200 unit tablet  1 Tab Oral TID WITH MEALS    senna-docusate (PERICOLACE) 8.6-50 mg per tablet 1 Tab  1 Tab Oral BID    [START ON 12/24/2019] polyethylene glycol (MIRALAX) packet 17 g  17 g Oral DAILY    [START ON 12/25/2019] bisacodyl (DULCOLAX) suppository 10 mg  10 mg Rectal DAILY PRN    [START ON 12/24/2019] enoxaparin (LOVENOX) injection 40 mg  40 mg SubCUTAneous DAILY    sodium chloride (NS) flush 5-40 mL  5-40 mL IntraVENous Q8H    sodium chloride (NS) flush 5-40 mL  5-40 mL IntraVENous PRN    hydrALAZINE (APRESOLINE) 20 mg/mL injection 10 mg  10 mg IntraVENous Q6H PRN    glucose chewable tablet 16 g  4 Tab Oral PRN    glucagon (GLUCAGEN) injection 1 mg  1 mg IntraMUSCular PRN    insulin lispro (HUMALOG) injection   SubCUTAneous Q6H    dextrose 10 % infusion 125-250 mL  125-250 mL IntraVENous PRN    tamsulosin (FLOMAX) capsule 0.4 mg  0.4 mg Oral DAILY    acetaminophen (TYLENOL) tablet 650 mg  650 mg Oral Q4H PRN    lactated Ringers infusion  75 mL/hr IntraVENous CONTINUOUS    morphine injection 2 mg  2 mg IntraVENous Q4H PRN     ______________________________________________________________________  EXPECTED LENGTH OF STAY: - - -  ACTUAL LENGTH OF STAY:          2                 Toni Maurer MD

## 2019-12-23 NOTE — PROGRESS NOTES
Bedside shift change report given to Josie   (oncoming nurse) by Milad Jordan (offgoing nurse). Report included the following information SBAR, Kardex, Intake/Output and MAR.

## 2019-12-23 NOTE — PROGRESS NOTES
TRANSFER - IN REPORT:    Verbal report received from Annabella(name) on [place]  being received from [place]) for routine progression of care      Report consisted of patients Situation, Background, Assessment and   Recommendations(SBAR). Information from the following report(s) SBAR, Kardex, Procedure Summary, Intake/Output and MAR was reviewed with the receiving nurse. Opportunity for questions and clarification was provided. Assessment completed upon patients arrival to unit and care assumed.

## 2019-12-23 NOTE — WOUND CARE
WOCN Note Attempted to see patient for consult. Patient off the floor in the OR. Wound care will see tomorrow. Lorrie AQUINO RN [facility] Wound Care Office [phone] Pager [phone]

## 2019-12-23 NOTE — OP NOTES
[address] [facility] [address], [address]     OP NOTE       Name: Mike Gallagher  MR#: [id number]  : 1940  Account #: [de-identified] Surgery Date: 2019  Date of Adm: 2019         ATTENDING PHYSICIAN: Eloy Cabrera MD     ASSISTANT: Clara Cho PA-C     PREOPERATIVE DIAGNOSIS: Left hip displaced femoral neck   fracture. POSTOPERATIVE DIAGNOSIS: Left hip displaced femoral neck   fracture. PROCEDURES PERFORMED: Left hip hemiarthroplasty. ANESTHESIA: general anesthesia. ESTIMATED BLOOD LOSS: 200 mL. SPECIMENS REMOVED: Femoral head. INDICATIONS: A 78 y.o. patient with a displaced   femoral neck fracture. OPERATION: The patient taken to the operating room, underwent   general anesthesia anesthesia. She was placed on the [place] table   with both feet in boots. Intermittent compression hose were placed on   bilateral lower legs. Right hip and leg were prepped and draped in the   usual fashion. A standard anterior approach was made to the hip down through skin   and subcutaneous tissue. Fascia was incised longitudinally. Tensor   muscle was dissected off the fascia and retracted posterolaterally. Fascia was incised and the rectus muscle was retracted   anteromedially. The circumflex vessels were clamped and cauterized. Rectus was elevated off the anterior capsule and the capsule was   incised in a T-shaped fashion. The capsule was dissected   subperiosteally. Each side of the capsule was tagged with #2 Vicryl   suture. Retractors were placed intracapsular. The hip was externally   rotated to 50 degrees. Femoral neck was recut at the appropriate level. The fracture had partially healed. Corkscrew was used to capture the   femoral head and remove it from the acetabulum. The femoral head   was sized to a size 56. Femoral elevator was placed around the femur   and femoral retractor was used to retract the femur anterolaterally. The   leg was brought down into hyperextension and external rotation. Capsular releases were performed. Femoral elevator was used to   bring the proximal femur up into the wound. After further capsular   releases, we had excellent exposure of the proximal femur. Canal was   reamed up to a size 9 tapered reamer. Canal was broached up to a   size 9 broach. [place] was removed. Canal was cleansed using   pulsatile lavage system with antibiotic solution. A size 9 [place] stem   was impacted down the intramedullary canal with good fixation. Size   56  bipolar component with a 1.5 head was impacted onto the femoral   component. The hip was then reduced and excellent range of motion   and stability was noted. Fluoroscopy was used to confirm placement of   our implants and leg lengths. The joint was extensively irrigated with antibiotic solution. Capsule was   repaired anatomically using #2 Vicryl interrupted figure-of-eight   fashion. Fascia was repaired using #1 Stratafix in a running fashion. Subcutaneous tissue was approximated using 2-0 Vicryl in interrupted   fashion. The skin edges were approximated using 3-0 Monocryl in   running subcuticular fashion, followed by Dermabond. Sterile dressing   was applied and the patient was awakened, extubated and transferred   to the recovery room in stable condition. There were no complications.            Ro Reyes MD

## 2019-12-23 NOTE — ACP (ADVANCE CARE PLANNING)
Received request that Mr Lazo's (room 12) daughter would like for patient to complete a new AMD. It was noted in patient's chart that he had dementia. It was also noted that patient already had an AMD on record. Mr Chelle De La Vega was lying quietly in bed and appeared in good spirits; his daughter, Nneka Rangel, was at his bedside when  arrived. Explained to Nneka aRngel that since MD's note stated patient had dementia, it would not be appropriate to have him complete a new AMD. Daughter expressed her concerns that patient would refuse critically needed medical treatment and she felt he would not be mentally competent to make such a decision; she wanted his MPOA's to be able to override such decisions should they occur. Roma Hankins to have physician state in his/her notes as to the patient's mental competency and if physician deemed Mr Chelle De La Vega incapable of making an informed decision, then his MPOA could act on his behalf. : Rev. Kimberley San.  August Davis; [facility], to contact [phone] [address] call: [phone]

## 2019-12-23 NOTE — ANESTHESIA POSTPROCEDURE EVALUATION
Procedure(s):  HIP ARTHROPLASTY TOTAL ANTERIOR APPROACH. general    Anesthesia Post Evaluation        Patient location during evaluation: PACU  Patient participation: complete - patient participated  Level of consciousness: awake  Pain management: adequate  Airway patency: patent  Anesthetic complications: no  Cardiovascular status: hemodynamically stable  Respiratory status: acceptable  Hydration status: acceptable  Comments: I have seen and evaluated the patient. The patient is ready for PACU discharge. [address], DO                         Vitals Value Taken Time   /69 12/23/2019  3:30 PM   Temp 36.8 °C (98.2 °F) 12/23/2019  3:23 PM   Pulse 82 12/23/2019  3:34 PM   Resp 14 12/23/2019  3:34 PM   SpO2 96 % 12/23/2019  3:34 PM   Vitals shown include unvalidated device data.

## 2019-12-23 NOTE — PROGRESS NOTES
Reason for Admission:   Closed hip fx. RRAT Score:   13               Do you (patient/family) have any concerns for transition/discharge? No              Plan for utilizing home health:   TBD    Current Advanced Directive/Advance Care Plan:  Yes. On file. Transition of Care Plan:    CM met with pt's daughter, Nikkie Wang ([phone]) to introduce her to CM role. (Pt has dementia). Per daughter, this pt lives at [facility] in the Memory care Unit. CM informed daughter that this pt may need continued therapy post d/c and gave her a list of rehabs. Will follow. [address] Management Interventions  PCP Verified by CM: Yes  Palliative Care Criteria Met (RRAT>21 & CHF Dx)?: No  Transition of Care Consult (CM Consult): Discharge Planning  MyChart Signup: No  Discharge Durable Medical Equipment: No  Physical Therapy Consult: No  Occupational Therapy Consult: No  Speech Therapy Consult: No  Current Support Network: Assisted Living([facility] (Memory Care Unit). )  Confirm Follow Up Transport: Family  The Plan for Transition of Care is Related to the Following Treatment Goals : Discharge planning.   The Patient and/or Patient Representative was Provided with a Choice of Provider and Agrees with the Discharge Plan?: Yes  Freedom of Choice List was Provided with Basic Dialogue that Supports the Patient's Individualized Plan of Care/Goals, Treatment Preferences and Shares the Quality Data Associated with the Providers?: Yes  [employer] Information Provided?: No

## 2019-12-23 NOTE — PROGRESS NOTES
Paged Dr. Golden Malin regarding 's request to have note put in the chart stating patient is incapable of making informed decisions. Madina [address] to Dr. Golden Malin. Per MD, she will put a note in the chart this afternoon.

## 2019-12-24 LAB
ANION GAP SERPL CALC-SCNC: 8 MMOL/L (ref 5–15)
BASOPHILS # BLD: 0.1 K/UL (ref 0–0.1)
BASOPHILS NFR BLD: 0 % (ref 0–1)
BUN SERPL-MCNC: 14 MG/DL (ref 6–20)
BUN/CREAT SERPL: 10 (ref 12–20)
CALCIUM SERPL-MCNC: 8.3 MG/DL (ref 8.5–10.1)
CHLORIDE SERPL-SCNC: 107 MMOL/L (ref 97–108)
CO2 SERPL-SCNC: 22 MMOL/L (ref 21–32)
CREAT SERPL-MCNC: 1.4 MG/DL (ref 0.7–1.3)
DIFFERENTIAL METHOD BLD: ABNORMAL
EOSINOPHIL # BLD: 0.3 K/UL (ref 0–0.4)
EOSINOPHIL NFR BLD: 2 % (ref 0–7)
ERYTHROCYTE [DISTWIDTH] IN BLOOD BY AUTOMATED COUNT: 14.7 % (ref 11.5–14.5)
GLUCOSE BLD STRIP.AUTO-MCNC: 162 MG/DL (ref 65–100)
GLUCOSE BLD STRIP.AUTO-MCNC: 209 MG/DL (ref 65–100)
GLUCOSE BLD STRIP.AUTO-MCNC: 257 MG/DL (ref 65–100)
GLUCOSE SERPL-MCNC: 180 MG/DL (ref 65–100)
HCT VFR BLD AUTO: 38.1 % (ref 36.6–50.3)
HGB BLD-MCNC: 12.6 G/DL (ref 12.1–17)
IMM GRANULOCYTES # BLD AUTO: 0.1 K/UL (ref 0–0.04)
IMM GRANULOCYTES NFR BLD AUTO: 1 % (ref 0–0.5)
LYMPHOCYTES # BLD: 1.5 K/UL (ref 0.8–3.5)
LYMPHOCYTES NFR BLD: 9 % (ref 12–49)
MAGNESIUM SERPL-MCNC: 1.8 MG/DL (ref 1.6–2.4)
MCH RBC QN AUTO: 28.4 PG (ref 26–34)
MCHC RBC AUTO-ENTMCNC: 33.1 G/DL (ref 30–36.5)
MCV RBC AUTO: 86 FL (ref 80–99)
MONOCYTES # BLD: 1.7 K/UL (ref 0–1)
MONOCYTES NFR BLD: 10 % (ref 5–13)
NEUTS SEG # BLD: 13.7 K/UL (ref 1.8–8)
NEUTS SEG NFR BLD: 78 % (ref 32–75)
NRBC # BLD: 0 K/UL (ref 0–0.01)
NRBC BLD-RTO: 0 PER 100 WBC
PHOSPHATE SERPL-MCNC: 3.1 MG/DL (ref 2.6–4.7)
PLATELET # BLD AUTO: 348 K/UL (ref 150–400)
PMV BLD AUTO: 11.5 FL (ref 8.9–12.9)
POTASSIUM SERPL-SCNC: 4.4 MMOL/L (ref 3.5–5.1)
RBC # BLD AUTO: 4.43 M/UL (ref 4.1–5.7)
SERVICE CMNT-IMP: ABNORMAL
SODIUM SERPL-SCNC: 137 MMOL/L (ref 136–145)
WBC # BLD AUTO: 17.3 K/UL (ref 4.1–11.1)

## 2019-12-24 PROCEDURE — 74011250637 HC RX REV CODE- 250/637: Performed by: PHYSICIAN ASSISTANT

## 2019-12-24 PROCEDURE — 97110 THERAPEUTIC EXERCISES: CPT

## 2019-12-24 PROCEDURE — 74011636637 HC RX REV CODE- 636/637: Performed by: PHYSICIAN ASSISTANT

## 2019-12-24 PROCEDURE — 82962 GLUCOSE BLOOD TEST: CPT

## 2019-12-24 PROCEDURE — 97161 PT EVAL LOW COMPLEX 20 MIN: CPT

## 2019-12-24 PROCEDURE — 80048 BASIC METABOLIC PNL TOTAL CA: CPT

## 2019-12-24 PROCEDURE — 85025 COMPLETE CBC W/AUTO DIFF WBC: CPT

## 2019-12-24 PROCEDURE — 97530 THERAPEUTIC ACTIVITIES: CPT

## 2019-12-24 PROCEDURE — 36415 COLL VENOUS BLD VENIPUNCTURE: CPT

## 2019-12-24 PROCEDURE — 83735 ASSAY OF MAGNESIUM: CPT

## 2019-12-24 PROCEDURE — 97165 OT EVAL LOW COMPLEX 30 MIN: CPT

## 2019-12-24 PROCEDURE — 65270000029 HC RM PRIVATE

## 2019-12-24 PROCEDURE — 84100 ASSAY OF PHOSPHORUS: CPT

## 2019-12-24 PROCEDURE — 74011250636 HC RX REV CODE- 250/636: Performed by: PHYSICIAN ASSISTANT

## 2019-12-24 RX ADMIN — CALCIUM CARBONATE 500 MG (1,250 MG)-VITAMIN D3 200 UNIT TABLET 1 TABLET: at 09:22

## 2019-12-24 RX ADMIN — INSULIN LISPRO 2 UNITS: 100 INJECTION, SOLUTION INTRAVENOUS; SUBCUTANEOUS at 13:21

## 2019-12-24 RX ADMIN — TAMSULOSIN HYDROCHLORIDE 0.4 MG: 0.4 CAPSULE ORAL at 09:22

## 2019-12-24 RX ADMIN — OXYCODONE 5 MG: 5 TABLET ORAL at 03:01

## 2019-12-24 RX ADMIN — ACETAMINOPHEN 650 MG: 325 TABLET ORAL at 13:21

## 2019-12-24 RX ADMIN — CALCIUM CARBONATE 500 MG (1,250 MG)-VITAMIN D3 200 UNIT TABLET 1 TABLET: at 18:33

## 2019-12-24 RX ADMIN — POLYETHYLENE GLYCOL 3350 17 G: 17 POWDER, FOR SOLUTION ORAL at 09:22

## 2019-12-24 RX ADMIN — OXYCODONE 5 MG: 5 TABLET ORAL at 20:01

## 2019-12-24 RX ADMIN — ENOXAPARIN SODIUM 40 MG: 40 INJECTION SUBCUTANEOUS at 09:22

## 2019-12-24 RX ADMIN — ACETAMINOPHEN 650 MG: 325 TABLET ORAL at 06:23

## 2019-12-24 RX ADMIN — SENNOSIDES AND DOCUSATE SODIUM 1 TABLET: 8.6; 5 TABLET ORAL at 18:33

## 2019-12-24 RX ADMIN — ACETAMINOPHEN 650 MG: 325 TABLET ORAL at 18:34

## 2019-12-24 RX ADMIN — OXYCODONE 5 MG: 5 TABLET ORAL at 09:33

## 2019-12-24 RX ADMIN — INSULIN LISPRO 3 UNITS: 100 INJECTION, SOLUTION INTRAVENOUS; SUBCUTANEOUS at 18:33

## 2019-12-24 RX ADMIN — SENNOSIDES AND DOCUSATE SODIUM 1 TABLET: 8.6; 5 TABLET ORAL at 09:22

## 2019-12-24 NOTE — PROGRESS NOTES
ORTHO FRACTURE PROGRESS NOTE    2019  Admit Date:   2019    Post Op day: 1 Day Post-Op    Subjective:    Thedora Fabry states that he has some pain with movement. Did not do much with OT this am. S/P left hip bipolar yesterday. No new complaints.   Tolerating diet  Denies N/V/SOB or CP     PT/OT:   Gait:                    Vital Signs:    Patient Vitals for the past 8 hrs:   BP Temp Pulse Resp SpO2 Weight   19 0829 143/81 98.4 °F (36.9 °C) 98 16 94 %    19 0601      78.5 kg (173 lb 1 oz)   19 0247 (!) 158/94 98.5 °F (36.9 °C) (!) 108 17 94 %      Temp (24hrs), Av.5 °F (36.9 °C), Min:98 °F (36.7 °C), Max:99.6 °F (37.6 °C)      Pain Control:   Pain Assessment  Pain Scale 1: Numeric (0 - 10)  Pain Intensity 1: 5  Pain Onset 1: acute  Pain Location 1: Hip  Pain Orientation 1: Left  Pain Description 1: Aching  Pain Intervention(s) 1: Medication (see MAR)    Meds:    Current Facility-Administered Medications   Medication Dose Route Frequency    0.9% sodium chloride infusion  125 mL/hr IntraVENous CONTINUOUS    sodium chloride (NS) flush 5-40 mL  5-40 mL IntraVENous Q8H    sodium chloride (NS) flush 5-40 mL  5-40 mL IntraVENous PRN    acetaminophen (TYLENOL) tablet 650 mg  650 mg Oral Q6H    oxyCODONE IR (ROXICODONE) tablet 2.5 mg  2.5 mg Oral Q4H PRN    oxyCODONE IR (ROXICODONE) tablet 5 mg  5 mg Oral Q4H PRN    naloxone (NARCAN) injection 0.4 mg  0.4 mg IntraVENous PRN    ondansetron (ZOFRAN ODT) tablet 4 mg  4 mg Oral Q8H PRN    ondansetron (ZOFRAN) injection 4 mg  4 mg IntraVENous Q8H PRN    calcium-vitamin D (OS-NICKO) 500 mg-200 unit tablet  1 Tab Oral TID WITH MEALS    senna-docusate (PERICOLACE) 8.6-50 mg per tablet 1 Tab  1 Tab Oral BID    polyethylene glycol (MIRALAX) packet 17 g  17 g Oral DAILY    [START ON 2019] bisacodyl (DULCOLAX) suppository 10 mg  10 mg Rectal DAILY PRN    enoxaparin (LOVENOX) injection 40 mg  40 mg SubCUTAneous DAILY    sodium chloride (NS) flush 5-40 mL  5-40 mL IntraVENous Q8H    sodium chloride (NS) flush 5-40 mL  5-40 mL IntraVENous PRN    hydrALAZINE (APRESOLINE) 20 mg/mL injection 10 mg  10 mg IntraVENous Q6H PRN    glucose chewable tablet 16 g  4 Tab Oral PRN    glucagon (GLUCAGEN) injection 1 mg  1 mg IntraMUSCular PRN    insulin lispro (HUMALOG) injection   SubCUTAneous Q6H    dextrose 10 % infusion 125-250 mL  125-250 mL IntraVENous PRN    tamsulosin (FLOMAX) capsule 0.4 mg  0.4 mg Oral DAILY    acetaminophen (TYLENOL) tablet 650 mg  650 mg Oral Q4H PRN    morphine injection 2 mg  2 mg IntraVENous Q4H PRN       LAB:    Recent Labs     12/24/19  0300  12/22/19  0531   HCT 38.1   < > 43.1   HGB 12.6   < > 14.2   INR  --   --  1.1    < > = values in this interval not displayed.        Transfuse PRBC's:      Assessment & Physician's Comment:  Left thigh soft with mild soreness  Bilateral calves soft and non-tender  Moves toes and ankles to command   Distal pulses palpable  Dressing is clean, dry, and intact  Neurovascular checks within normal limits  Orientation:  Disoriented (baseline)    Principal Problem:    Femoral neck fracture ([address] (12/22/2019)    Active Problems:    Closed left hip fracture (HCC) (12/21/2019)        Plan:    Cont PT/OT - WBAT  Tylenol for pain with oxycodone PRN  Ice packs to hip PRN  Lovenox for DVT prophylaxis  Medical management per primary team  Case Management for disposition planning - likely SNF    Chaim Ackerman PA-C   Orthopedic Trauma Service  [facility]

## 2019-12-24 NOTE — PROGRESS NOTES
NUTRITION  Pt seen for:     Reason for Rescreen: Provider Consult          RECOMMENDATIONS:   Continue current diet  Interventions   - added supplements/snacks: Glucerna BID       Information obtained from:   patient      Past Medical History:   Diagnosis Date    Diabetes ([facility] [address]     diet controlled    Hypertension     Meningioma ([facility] [address]     Vertigo      Pt admitted for hip fx s/p repair yesterday. Lives at [facility] with dementia noted. On consistent CHO diet there and able to feed himself. Pt visited today. Reports appetite down slightly since surgery. PCT at bedside reports doing better with drinking. Pt hesistant about supplements at first but agreeable to Glucerna BID (chocolate) = 440kcal, 20g protein. Encouraged good protein intake with all meals. No significant wt loss noted and appears well nourished. Will rescreen per protocol.      Diet:  consistent CHO  Supplements: None  Intake: Fair    Weight Changes:   Stable  Wt Readings from Last 10 Encounters:   12/24/19 78.5 kg (173 lb 1 oz)   05/28/18 84 kg (185 lb 3 oz)   10/11/17 83.9 kg (185 lb)     Nutrition-Related Concerns Identified:  None    PLAN:   - Continue current diet and - Check for acceptance of supplements    Rescreen:  []            At Nutrition Risk - will follow          [x]            Rescreen per screening protocol    Cielo Nicholas RD [facility], Pager #[phone] or via [employer]

## 2019-12-24 NOTE — PROGRESS NOTES
Problem: Self Care Deficits Care Plan (Adult)  Goal: *Acute Goals and Plan of Care (Insert Text)  Description  FUNCTIONAL STATUS PRIOR TO ADMISSION: Patient was modified independent using a rollator for functional mobility. HOME SUPPORT: The patient lived in [facility] in the memory care unit and had assistance as needed with ADLs. Occupational Therapy Goals  Initiated 12/24/2019  1. Patient will perform lower body ADLs with AE moderate assistance within 4 day(s). 2.  Patient will perform upper body ADLs sitting EOB with supervision/set-up within 4 day(s). 3.  Patient will perform toilet transfer with moderate assistance within 4 day(s). 4.  Patient will perform all aspects of toileting with moderate assistance within 4 day(s). 5.  Patient will participate in upper extremity therapeutic exercise/activities with supervision/set-up for 10 minutes within 4 day(s). 6.  Patient will utilize energy conservation techniques during functional activities without cues within 4 day(s). Outcome: Not Met    OCCUPATIONAL THERAPY EVALUATION  Patient: Karlie Lassiter (57 y.o. male)  Date: 12/24/2019  Primary Diagnosis: Closed left hip fracture (HCC) [S72.002A]  Femoral neck fracture (HCC) [S72.009A]  Procedure(s) (LRB):  HIP ARTHROPLASTY TOTAL ANTERIOR APPROACH (Left) 1 Day Post-Op   Precautions:  Fall, WBAT    ASSESSMENT  Based on the objective data described below, the patient presents with severely limited ADL performance s/p L femoral neck fx with resulting anterior LORNE, now POD #1. Patient limited by cognition/memory (attention to task, complex processing, sequencing), fear of pain with movement, generalized weakness, decreased functional activity tolerance, self limiting behavior (may be due to hx of dementia), and impaired functional reach to feet. At baseline, patient reports living in assisted living and using a rollator for mobility - patient with inconsistent reports of need for assist with ADLs.      Today, patient received in bed with sitter present. Patient able to self feed with setup. Noted BUE ROM, strength, sensation and coordination intact. Attempted to have patient sit EOB to complete grooming/oral care. Patient yelling out and telling therapist to stop with even slightest movement of LLE. Attempt to sit EOB aborted. Based on presentation today, anticipate patient will require SNF level rehab once medically cleared for D/C. Will continue to follow. Current Level of Function Impacting Discharge (ADLs/self-care): up to min A for upper body, infer max-total A for lower body    Functional Outcome Measure: The patient scored 25/100 on the Barthel Index outcome measure which is indicative of severe deficits in ADLs and mobility. Other factors to consider for discharge: was mod I with mobility at baseline. Patient will benefit from skilled therapy intervention to address the above noted impairments. PLAN :  Recommendations and Planned Interventions: self care training, functional mobility training, therapeutic exercise, balance training, therapeutic activities, endurance activities, patient education, home safety training, and family training/education    Frequency/Duration: Patient will be followed by occupational therapy 5 times a week to address goals. Recommendation for discharge: (in order for the patient to meet his/her long term goals)  Therapy up to 5 days/week in SNF setting    This discharge recommendation:  Has not yet been discussed the attending provider and/or case management    IF patient discharges home will need the following DME: to be determined (TBD)       SUBJECTIVE:   Patient stated I am not okay with this.     OBJECTIVE DATA SUMMARY:   HISTORY:   Past Medical History:   Diagnosis Date    Diabetes ([facility] [address]     diet controlled    Hypertension     Meningioma ([facility] [address]     Vertigo      Past Surgical History:   Procedure Laterality Date    HX TONSILLECTOMY         Expanded or extensive additional review of patient history:     Home Situation  Home Environment: Assisted living  One/Two Story Residence: One story  Living Alone: No  Support Systems: Assisted living  Current DME Used/Available at Home: Ivis Boss, adenator, Grab bars, Shower chair  Tub or Shower Type: Shower    Hand dominance: Right    EXAMINATION OF PERFORMANCE DEFICITS:  Cognitive/Behavioral Status:  Neurologic State: Alert;Confused  Orientation Level: Oriented to person;Oriented to place; Disoriented to situation;Disoriented to time  Cognition: Impaired decision making;Memory loss;Decreased attention/concentration; Follows commands  Perception: (unable to formally assess 2* patient unwilling to move)  Perseveration: Perseverates during conversation(on pain and not being able to move)  Safety/Judgement: Decreased awareness of environment;Decreased awareness of need for assistance;Decreased awareness of need for safety;Decreased insight into deficits; Fall prevention    Skin: Appears grossly intact    Edema: none noted in BUEs    Hearing: Auditory  Auditory Impairment: None    Vision/Perceptual:    Tracking: Able to track stimulus in all quadrants w/o difficulty    Diplopia: No    Acuity: Within Defined Limits    Corrective Lenses: Glasses    Range of Motion:  In BUEs  AROM: Within functional limits    Strength: In BUEs  Strength: Within functional limits    Coordination:  Coordination: Within functional limits  Fine Motor Skills-Upper: Left Intact; Right Intact    Gross Motor Skills-Upper: Left Intact; Right Intact    Tone & Sensation:  In BUEs  Tone: Normal  Sensation: Intact    Balance:  Sitting: (unwilling to come to sitting EOB)    Functional Mobility and Transfers for ADLs:  Bed Mobility:  Rolling: Total assistance  Scooting: Total assistance    Transfers:   Toilet Transfer : Maximum assistance(infer per obs of balance, strength)    ADL Assessment:  Feeding: Setup    Oral Facial Hygiene/Grooming: Setup    Bathing: Maximum assistance(Infer per obs of func reach, balance, strength)    Upper Body Dressing: Setup    Lower Body Dressing: Maximum assistance(Infer per obs of func reach, balance, strength)    Toileting: Maximum assistance(Infer per obs of func reach, balance, strength)    ADL Intervention and task modifications:  Feeding  Container Management: Set-up  Utensil Management: Set-up  Food to Mouth: Independent  Drink to Mouth: Independent    Cognitive Retraining  Safety/Judgement: Decreased awareness of environment;Decreased awareness of need for assistance;Decreased awareness of need for safety;Decreased insight into deficits; Fall prevention    Functional Measure:  Barthel Index:    Bathin  Bladder: 5  Bowels: 10  Groomin  Dressin  Feedin  Mobility: 0  Stairs: 0  Toilet Use: 0  Transfer (Bed to Chair and Back): 0  Total: 25/100        The Barthel ADL Index: Guidelines  1. The index should be used as a record of what a patient does, not as a record of what a patient could do. 2. The main aim is to establish degree of independence from any help, physical or verbal, however minor and for whatever reason. 3. The need for supervision renders the patient not independent. 4. A patient's performance should be established using the best available evidence. Asking the patient, friends/relatives and nurses are the usual sources, but direct observation and common sense are also important. However direct testing is not needed. 5. Usually the patient's performance over the preceding 24-48 hours is important, but occasionally longer periods will be relevant. 6. Middle categories imply that the patient supplies over 50 per cent of the effort. 7. Use of aids to be independent is allowed. Matilde Bo., Barthel, D.W. (1403). Functional evaluation: the Barthel Index. [facility] (14)2. ZAID Jack, Nadir Villarreal., Manas Morillo., Anahi Lawson, [address] [place] [address] ().  Measuring the change indisability after inpatient rehabilitation; comparison of the responsiveness of the Barthel Index and Functional [place] Measure. Journal of Neurology, Neurosurgery, and Psychiatry, 66(4), 938-330. ALEXANDRE Ward, BAUDILIO Alcantara, & Manuel Dyson M.A. (2004.) Assessment of post-stroke quality of life in cost-effectiveness studies: The usefulness of the Barthel Index and the EuroQoL-5D. Quality of Life Research, 15, 229-83       Occupational Therapy Evaluation Charge Determination   History Examination Decision-Making   LOW Complexity : Brief history review  MEDIUM Complexity : 3-5 performance deficits relating to physical, cognitive , or psychosocial skils that result in activity limitations and / or participation restrictions HIGH Complexity : Patient presents with comorbidities that affect occupational performance. Signifigant modification of tasks or assistance (eg, physical or verbal) with assessment (s) is necessary to enable patient to complete evaluation       Based on the above components, the patient evaluation is determined to be of the following complexity level: MEDIUM  Pain Rating:  Did not quantify - would not allow movement of LE    Activity Tolerance:   Poor  Please refer to the flowsheet for vital signs taken during this treatment. After treatment patient left in no apparent distress:    Supine in bed, Call bell within reach, Caregiver / family present, Side rails x 3, and RN aware     COMMUNICATION/EDUCATION:   The patients plan of care was discussed with: Physical Therapist and Registered Nurse. Home safety education was provided and the patient/caregiver indicated understanding. and Patient/family have participated as able in goal setting and plan of care. This patients plan of care is appropriate for delegation to [facility].     Thank you for this referral.  Rommel Mendoza OT  Time Calculation: 17 mins

## 2019-12-24 NOTE — PROGRESS NOTES
Problem: Pressure Injury - Risk of  Goal: *Prevention of pressure injury  Description  Document Delvin Scale and appropriate interventions in the flowsheet. Outcome: Progressing Towards Goal  Note: Pressure Injury Interventions:  Sensory Interventions: Assess changes in LOC, Check visual cues for pain, Keep linens dry and wrinkle-free, Minimize linen layers, Pressure redistribution bed/mattress (bed type)    Moisture Interventions: Absorbent underpads, Check for incontinence Q2 hours and as needed, Minimize layers    Activity Interventions: Pressure redistribution bed/mattress(bed type), PT/OT evaluation    Mobility Interventions: HOB 30 degrees or less, Pressure redistribution bed/mattress (bed type), PT/OT evaluation    Nutrition Interventions: Offer support with meals,snacks and hydration    Friction and Shear Interventions: Apply protective barrier, creams and emollients, HOB 30 degrees or less, Lift sheet, Minimize layers                Problem: Diabetes Self-Management  Goal: *Monitoring blood glucose, interpreting and using results  Description  Identify recommended blood glucose targets  and personal targets. Outcome: Progressing Towards Goal  Note:   Monitor blood sugar  Administer insulin as ordered     Problem: Falls - Risk of  Goal: *Absence of Falls  Description  Document Cj Alas Fall Risk and appropriate interventions in the flowsheet.   Outcome: Progressing Towards Goal  Note: Fall Risk Interventions:  Mobility Interventions: Communicate number of staff needed for ambulation/transfer, Patient to call before getting OOB, PT Consult for mobility concerns    Mentation Interventions: Adequate sleep, hydration, pain control, Door open when patient unattended, Bed/chair exit alarm, Evaluate medications/consider consulting pharmacy, Toileting rounds, Reorient patient, Room close to nurse's station    Medication Interventions: Evaluate medications/consider consulting pharmacy, Teach patient to arise slowly, Reason for Call:  Other prescription    Detailed comments: asking for refill for methylphenidate ER (CONCERTA) 18 MG CR tablet    Phone Number Patient can be reached at: Home number on file [phone] (home)    Best Time: any    Can we leave a detailed message on this number? YES    Call taken on 1/10/2019 at 8:02 AM by Mindi Nguyen     Patient to call before getting OOB    Elimination Interventions: Call light in reach, Patient to call for help with toileting needs, Elevated toilet seat, Stay With Me (per policy), Toilet paper/wipes in reach, Toileting schedule/hourly rounds, Urinal in reach    History of Falls Interventions: Consult care management for discharge planning, Bed/chair exit alarm, Door open when patient unattended, Evaluate medications/consider consulting pharmacy, Investigate reason for fall, Room close to nurse's station, Utilize gait belt for transfer/ambulation         Problem: Hip Replacement: Post-Op Day 2  Goal: Activity/Safety  Outcome: Progressing Towards Goal  Note:   Patient will have bedrest today r/t hypotension  Goal: *Hemodynamically stable  Outcome: Progressing Towards Goal  Note:   Assess vitals as ordered/needed and monitor for changes

## 2019-12-24 NOTE — WOUND CARE
WOCN Note:  
 
New consult placed for assessment of sacrum. Chart reviewed. Admitted DX:  Closed left hip fracture; Femoral neck fracture 12.23.19 S/p Left hip hemiarthroplasty. Assessment:   
Patient is alert, communicative and requires assist with repositioning. Bed: p500 air mattress Patient wearing briefs for incontinence. Patient reports left hip pain. Patient repositioned on right side with pillow. Heels intact without erythema and offloaded on pillows. 1.  Sacrum and buttocks intact with blanching red erythema. Recommendations:   
1. Sacrum and buttocks:  Protect with hydraguard (blue tube). 2.  Continue air mattress 3. Turn team 
 
Skin Care & Pressure Prevention: 
Minimize layers of linen/pads under patient to optimize support surface. Turn/reposition approximately every 2 hours and offload heels. Manage incontinence / promote continence Discussed above plan with patient and Kelsey RN. Transition of Care: Plan to follow as needed while admitted to hospital. 
 
MILES Blancas RN [facility] [phone] Pager [phone]

## 2019-12-24 NOTE — PROGRESS NOTES
Bedside and Verbal shift change report given to Steven Vigil RN (oncoming nurse) by Dayanara Sandoval RN (offgoing nurse). Report included the following information SBAR, Kardex, Intake/Output, MAR and Recent Results.

## 2019-12-24 NOTE — PROGRESS NOTES
[address] [facility]                                                                                          Hospitalist Progress Note  Soco Choudhury MD  Answering service: [phone] OR [phone] from in house phone        Date of Service:  2019  NAME:  Yeyo Perez  :  1940  MRN:  [id number]      Admission Summary:   Yeyo Perez is a 78 y.o. male with past medical history of hypertension, diabetes mellitus who [facility] on account of closed fracture of the neck of left femur.     Interval history / Subjective:   Pleasantly confused, plan for OR today      Assessment & Plan:     L hip fracture  - Ortho following, to OR  LEFT HIP HEMIARTHROPLASTY, ANTERIOR APPROACH  - DVT PPX sc lovenox per ortho   - PRN IV morphine and PO oxycodone, high dose scheduled tylenol for pain  - PT/OT post op    Tyep 2 diabetes - A1c 7.2%  - SSI, POCT glucose checks and hypoglycemia  - Hold metformin while inaptient     Elevated Cr and dehydration on CKD stage 3 - Cr on admit 1.66, improved  - I and O   - Monitor BMP daily  - DC IVF      BPH - home tamsulosin     Code status: FULL  DVT prophylaxis: Per Ortho    Care Plan discussed with: Patient/Family  Disposition: TBD SNF when stable   POA - daughters. Hospital Problems  Date Reviewed: 2019          Codes Class Noted POA    * (Principal) Femoral neck fracture ([facility] [address] ICD-10-CM: S72.009A  ICD-9-CM: 820.8  2019 Yes        Closed left hip fracture [facility]) ICD-10-CM: H67.704Q  ICD-9-CM: 820.8  2019 Unknown                Review of Systems:   A comprehensive review of systems was negative except for that written in the HPI. Vital Signs:    Last 24hrs VS reviewed since prior progress note.  Most recent are:  Visit Vitals  /81 (BP 1 Location: Left arm, BP Patient Position: At rest)   Pulse 98   Temp 98.4 °F (36.9 °C)   Resp 16   Ht 5' 11\" (1.803 m)   Wt 78.5 kg (173 lb 1 oz)   SpO2 94%   BMI 24.14 kg/m² Intake/Output Summary (Last 24 hours) at 12/24/2019 1329  Last data filed at 12/23/2019 1514  Gross per 24 hour   Intake 1000 ml   Output 625 ml   Net 375 ml        Physical Examination:             Constitutional:  No acute distress, cooperative, pleasant    ENT:  Oral mucous moist, oropharynx benign. Resp:  CTA bilaterally. No wheezing/rhonchi/rales. No accessory muscle use   CV:  Regular rhythm, normal rate, no murmurs, gallops, rubs    GI:  Soft, non distended, non tender. normoactive bowel sounds, no hepatosplenomegaly     Musculoskeletal:  left hip dressing clean     Neurologic:  Moves all extremities. AAOx1 (self), L hip elevated      Skin:  Good turgor, no rashes or ulcers       Data Review:    Review and/or order of clinical lab test  Review and/or order of tests in the radiology section of CPT  Review and/or order of tests in the medicine section of CPT      Labs:     Recent Labs     12/24/19  0300 12/23/19  0642   WBC 17.3* 15.4*   HGB 12.6 14.1   HCT 38.1 41.1    323     Recent Labs     12/24/19  0300 12/23/19  0642 12/22/19  0531    137 137   K 4.4 4.3 4.4    107 105   CO2 22 23 27   BUN 14 19 23*   CREA 1.40* 1.29 1.59*   * 161* 155*   CA 8.3* 8.7 9.1   MG 1.8 1.9  --    PHOS 3.1 3.1  --      Recent Labs     12/22/19  0531 12/21/19  1841   SGOT 13* 16   ALT 23 26   AP 92 88   TBILI 1.0 0.5   TP 7.0 6.9   ALB 3.9 3.8   GLOB 3.1 3.1     Recent Labs     12/22/19  0531   INR 1.1   PTP 10.7      No results for input(s): FE, TIBC, PSAT, FERR in the last 72 hours. No results found for: FOL, RBCF   No results for input(s): PH, PCO2, PO2 in the last 72 hours. No results for input(s): CPK, CKNDX, TROIQ in the last 72 hours.     No lab exists for component: CPKMB  No results found for: CHOL, CHOLX, CHLST, CHOLV, HDL, HDLP, LDL, LDLC, DLDLP, TGLX, TRIGL, TRIGP, CHHD, CHHDX  Lab Results   Component Value Date/Time    Glucose (POC) 209 (H) 12/24/2019 12:34 PM    Glucose (POC) 162 (H) 12/24/2019 06:00 AM    Glucose (POC) 176 (H) 12/23/2019 11:20 PM    Glucose (POC) 153 (H) 12/23/2019 06:04 PM    Glucose (POC) 145 (H) 12/23/2019 03:29 PM     Lab Results   Component Value Date/Time    Color YELLOW/STRAW 10/11/2017 11:45 PM    Appearance CLEAR 10/11/2017 11:45 PM    Specific gravity 1.027 10/11/2017 11:45 PM    pH (UA) 5.5 10/11/2017 11:45 PM    Protein 100 (A) 10/11/2017 11:45 PM    Glucose NEGATIVE  10/11/2017 11:45 PM    Ketone 40 (A) 10/11/2017 11:45 PM    Bilirubin NEGATIVE  10/11/2017 11:45 PM    Urobilinogen 0.2 10/11/2017 11:45 PM    Nitrites NEGATIVE  10/11/2017 11:45 PM    Leukocyte Esterase TRACE (A) 10/11/2017 11:45 PM    Epithelial cells FEW 10/11/2017 11:45 PM    Bacteria NEGATIVE  10/11/2017 11:45 PM    WBC 0-4 10/11/2017 11:45 PM    RBC 0-5 10/11/2017 11:45 PM         Medications Reviewed:     Current Facility-Administered Medications   Medication Dose Route Frequency    0.9% sodium chloride infusion  125 mL/hr IntraVENous CONTINUOUS    sodium chloride (NS) flush 5-40 mL  5-40 mL IntraVENous Q8H    sodium chloride (NS) flush 5-40 mL  5-40 mL IntraVENous PRN    acetaminophen (TYLENOL) tablet 650 mg  650 mg Oral Q6H    oxyCODONE IR (ROXICODONE) tablet 2.5 mg  2.5 mg Oral Q4H PRN    oxyCODONE IR (ROXICODONE) tablet 5 mg  5 mg Oral Q4H PRN    naloxone (NARCAN) injection 0.4 mg  0.4 mg IntraVENous PRN    ondansetron (ZOFRAN ODT) tablet 4 mg  4 mg Oral Q8H PRN    ondansetron (ZOFRAN) injection 4 mg  4 mg IntraVENous Q8H PRN    calcium-vitamin D (OS-NICKO) 500 mg-200 unit tablet  1 Tab Oral TID WITH MEALS    senna-docusate (PERICOLACE) 8.6-50 mg per tablet 1 Tab  1 Tab Oral BID    polyethylene glycol (MIRALAX) packet 17 g  17 g Oral DAILY    [START ON 12/25/2019] bisacodyl (DULCOLAX) suppository 10 mg  10 mg Rectal DAILY PRN    enoxaparin (LOVENOX) injection 40 mg  40 mg SubCUTAneous DAILY    sodium chloride (NS) flush 5-40 mL  5-40 mL IntraVENous Q8H    sodium chloride (NS) flush 5-40 mL  5-40 mL IntraVENous PRN    hydrALAZINE (APRESOLINE) 20 mg/mL injection 10 mg  10 mg IntraVENous Q6H PRN    glucose chewable tablet 16 g  4 Tab Oral PRN    glucagon (GLUCAGEN) injection 1 mg  1 mg IntraMUSCular PRN    insulin lispro (HUMALOG) injection   SubCUTAneous Q6H    dextrose 10 % infusion 125-250 mL  125-250 mL IntraVENous PRN    tamsulosin (FLOMAX) capsule 0.4 mg  0.4 mg Oral DAILY    acetaminophen (TYLENOL) tablet 650 mg  650 mg Oral Q4H PRN    morphine injection 2 mg  2 mg IntraVENous Q4H PRN     ______________________________________________________________________  EXPECTED LENGTH OF STAY: - - -  ACTUAL LENGTH OF STAY:          3                 Smooth Dallas MD

## 2019-12-24 NOTE — PROGRESS NOTES
Problem: Mobility Impaired (Adult and Pediatric)  Goal: *Acute Goals and Plan of Care (Insert Text)  Description  FUNCTIONAL STATUS PRIOR TO ADMISSION: Patient required minimal assistance for basic and instrumental ADLs. HOME SUPPORT PRIOR TO ADMISSION: The patient lived in Memory Unit at [facility]. Physical Therapy Goals  Initiated 12/24/2019  1. Patient will move from supine to sit and sit to supine , scoot up and down and roll side to side in bed with moderate assistance  within 7 day(s). 2.  Patient will transfer from bed to chair and chair to bed with moderate assistance  using the least restrictive device within 7 day(s). 3.  Patient will perform sit to stand with moderate assistance  within 7 day(s). 4.  Patient will ambulate with moderate assistance of 2 for 10 feet with the least restrictive device within 7 day(s). Outcome: Progressing Towards Goal   PHYSICAL THERAPY EVALUATION  Patient: Bonifacio Freeman (84 y.o. male)  Date: 12/24/2019  Primary Diagnosis: Closed left hip fracture (HCC) [S72.002A]  Femoral neck fracture (HCC) [S72.009A]  Procedure(s) (LRB):  HIP ARTHROPLASTY TOTAL ANTERIOR APPROACH (Left) 1 Day Post-Op   Precautions:   Fall, WBAT      ASSESSMENT  Based on the objective data described below, the patient presents with  impairment in functional mobility, activity tolerance and balance s/p fall & subsequent left hip arthroplasty. PLOF: Lives in Memory Unit at [address] Per daughter, ambulatory short distances with rollator and stand by assistance, walked very forward flexed. Daughter present for session. Family's goal is for him to return to [facility] (hope[facility]) after a rehab stay at [facility]. Patient initially resistant to moving (pain and fear of falling), but cooperative after much encouragement and ice cream bribe. Current Level of Function Impacting Discharge (mobility/balance): All bed mobility required maximal assistance of 2.  After initial attempts, he was able to maintain static sitting for 4 seconds at at time. Demonstrated to daughter how to place bed into modified chair position for brief periods of time frequently during the day (especially for eating) in order to encourage upright posture/normalize blood pressure and balance response. In supine, performed physically cued and assisted LLE exercises. Functional Outcome Measure: The patient scored 25/100 on the Barthel outcome measure which is indicative of maximal impaired ability to care for basic self-needs/dependency on others. Other factors to consider for discharge: Dementia/Assisted mobility PLOF/Supportive Family/lives in [facility]       Patient will benefit from skilled therapy intervention to address the above noted impairments. PLAN :  Recommendations and Planned Interventions: bed mobility training, transfer training, gait training, therapeutic exercises, patient and family training/education, and therapeutic activities      Frequency/Duration: Patient will be followed by physical therapy:  twice daily for 2 days, then daily, to address goals. Recommendation for discharge: (in order for the patient to meet his/her long term goals)  Therapy up to 5 days/week in SNF setting    This discharge recommendation:  Has been made in collaboration with the attending provider and/or case management    IF patient discharges home will need the following DME: to be determined (TBD)         SUBJECTIVE:   Patient stated I can't get up. Not today.     OBJECTIVE DATA SUMMARY:   HISTORY:    Past Medical History:   Diagnosis Date    Diabetes ([address]     diet controlled    Hypertension     Meningioma ([address]     Vertigo      Past Surgical History:   Procedure Laterality Date    HX TONSILLECTOMY         Personal factors and/or comorbidities impacting plan of care: Dementia/Assisted mobility PLOF/Supportive Family/lives in [facility]    Home Situation  Home Environment: Assisted living  One/Two Story Residence: One story  Living Alone: No  Support Systems: Assisted living  Current DME Used/Available at Home: Stephanie Medico, rollator, Grab bars, Shower chair  Tub or Shower Type: Shower    EXAMINATION/PRESENTATION/DECISION MAKING:   Critical Behavior:  Neurologic State: Alert, Confused  Orientation Level: Oriented to person, Oriented to place, Disoriented to situation, Disoriented to time  Cognition: Impaired decision making, Memory loss, Decreased attention/concentration, Follows commands  Safety/Judgement: Decreased awareness of environment, Decreased awareness of need for assistance, Decreased awareness of need for safety, Decreased insight into deficits, Fall prevention  Hearing: Auditory  Auditory Impairment: None    Range Of Motion:  AROM: Generally decreased, functional                       Strength:    Strength: Generally decreased, functional                    Tone & Sensation:   Tone: Normal              Sensation: Impaired               Coordination:  Coordination: Generally decreased, functional  Vision:   Tracking: Able to track stimulus in all quadrants w/o difficulty  Diplopia: No  Acuity: Within Defined Limits  Corrective Lenses: Glasses  Functional Mobility:  Bed Mobility:  Rolling: Maximum assistance;Assist x2  Supine to Sit: Maximum assistance;Assist x2  Sit to Supine: Maximum assistance;Assist x2  Scooting: Total assistance;Assist x2  Transfers:      Unable-refused to try                       Balance:   Sitting: Impaired  Sitting - Static: Fair (occasional)  Sitting - Dynamic: Fair (occasional)  Standing: (Refused to try today)  Ambulation/Gait Training:            Therapeutic Exercises:    Ankle Pumps  Physically Cued Quad sets (5 second hold)  X 10 reps every hour   Assisted heel slides x 10    Functional Measure:  Barthel Index:    Bathin  Bladder: 5  Bowels: 10  Groomin  Dressin  Feedin  Mobility: 0  Stairs: 0  Toilet Use: 0  Transfer (Bed to Chair and Back): 0  Total: 25/100       The Barthel ADL Index: Guidelines  1. The index should be used as a record of what a patient does, not as a record of what a patient could do. 2. The main aim is to establish degree of independence from any help, physical or verbal, however minor and for whatever reason. 3. The need for supervision renders the patient not independent. 4. A patient's performance should be established using the best available evidence. Asking the patient, friends/relatives and nurses are the usual sources, but direct observation and common sense are also important. However direct testing is not needed. 5. Usually the patient's performance over the preceding 24-48 hours is important, but occasionally longer periods will be relevant. 6. Middle categories imply that the patient supplies over 50 per cent of the effort. 7. Use of aids to be independent is allowed. Deysi Duncan, Barthel, D.W. (7680). Functional evaluation: the Barthel Index. [address] [facility] (14)2. Johnna Montoya octavia ZAID Rodriguez, Og Martínez., [facility] Dobson., [place], [address] [place] [address] (1999). Measuring the change indisability after inpatient rehabilitation; comparison of the responsiveness of the Barthel Index and Functional [place] Measure. Journal of Neurology, Neurosurgery, and Psychiatry, 66(4), 569-968. Lashell Lu, N.J.A, BAUDILIO Alcantara, & Musa Morales MTWYLA. (2004.) Assessment of post-stroke quality of life in cost-effectiveness studies: The usefulness of the Barthel Index and the EuroQoL-5D.  Quality of Life Research, 15, 535-41           Physical Therapy Evaluation Charge Determination   History Examination Presentation Decision-Making   MEDIUM  Complexity : 1-2 comorbidities / personal factors will impact the outcome/ POC  MEDIUM Complexity : 3 Standardized tests and measures addressing body structure, function, activity limitation and / or participation in recreation  MEDIUM Complexity : Evolving with changing characteristics  HIGH Complexity : FOTO score of 1- 25 Based on the above components, the patient evaluation is determined to be of the following complexity level: MEDIUM    Pain Ratin/10    Activity Tolerance:   Fair-limited by pain. After treatment patient left in no apparent distress:   Call bell within reach, Caregiver / family present, Side rails x 3, and bed in chair position, nurse notified. COMMUNICATION/EDUCATION:   The patients plan of care was discussed with: Occupational Therapist and Registered Nurse. Patient unable to participate in plan of care and goals. However daughter participated and demonstrated understanding of Fall prevention/PT Goals and PT Plan of Care.     Thank you for this referral.  Stephen Clarke   Time Calculation: 30 mins

## 2019-12-24 NOTE — PROGRESS NOTES
Bedside and Verbal shift change report given to Sara Green (oncoming nurse) by Zeinab Zee (offgoing nurse). Report included the following information SBAR, Kardex, Procedure Summary, Intake/Output and MAR.

## 2019-12-24 NOTE — PROGRESS NOTES
HUMBLE:    Referral sent to [facility] via [place]. Daughter requested that patient not be discharged to a SNF until Friday when her brother arrives in [place].     Dariel Jimenez RN/CRM

## 2019-12-24 NOTE — PROGRESS NOTES
Orders received, chart reviewed and patient evaluated by occupational therapy. Pending progression with skilled acute occupational therapy, recommend:  Therapy up to 5 days/week in SNF setting     Recommend with nursing patient to complete as able in order to maintain strength, endurance and independence: EOB 3x/day for meals with 2 assist and functional mobility in bed for toileting with 2 assist. Thank you for your assistance. Full evaluation to follow.

## 2019-12-24 NOTE — PROGRESS NOTES
Bedside and Verbal shift change report given to Jordyn Odell RN (oncoming nurse) by Amando Durand RN (offgoing nurse). Report included the following information Kardex, OR Summary, Intake/Output, MAR and Recent Results.

## 2019-12-25 LAB
BASOPHILS # BLD: 0 K/UL (ref 0–0.1)
BASOPHILS NFR BLD: 0 % (ref 0–1)
DIFFERENTIAL METHOD BLD: ABNORMAL
EOSINOPHIL # BLD: 0.3 K/UL (ref 0–0.4)
EOSINOPHIL NFR BLD: 2 % (ref 0–7)
ERYTHROCYTE [DISTWIDTH] IN BLOOD BY AUTOMATED COUNT: 14.6 % (ref 11.5–14.5)
GLUCOSE BLD STRIP.AUTO-MCNC: 159 MG/DL (ref 65–100)
GLUCOSE BLD STRIP.AUTO-MCNC: 179 MG/DL (ref 65–100)
GLUCOSE BLD STRIP.AUTO-MCNC: 257 MG/DL (ref 65–100)
GLUCOSE BLD STRIP.AUTO-MCNC: 295 MG/DL (ref 65–100)
HCT VFR BLD AUTO: 35 % (ref 36.6–50.3)
HGB BLD-MCNC: 11.7 G/DL (ref 12.1–17)
IMM GRANULOCYTES # BLD AUTO: 0.1 K/UL (ref 0–0.04)
IMM GRANULOCYTES NFR BLD AUTO: 1 % (ref 0–0.5)
LYMPHOCYTES # BLD: 1.5 K/UL (ref 0.8–3.5)
LYMPHOCYTES NFR BLD: 10 % (ref 12–49)
MCH RBC QN AUTO: 28.5 PG (ref 26–34)
MCHC RBC AUTO-ENTMCNC: 33.4 G/DL (ref 30–36.5)
MCV RBC AUTO: 85.4 FL (ref 80–99)
MONOCYTES # BLD: 1.7 K/UL (ref 0–1)
MONOCYTES NFR BLD: 11 % (ref 5–13)
NEUTS SEG # BLD: 11.9 K/UL (ref 1.8–8)
NEUTS SEG NFR BLD: 76 % (ref 32–75)
NRBC # BLD: 0 K/UL (ref 0–0.01)
NRBC BLD-RTO: 0 PER 100 WBC
PLATELET # BLD AUTO: 365 K/UL (ref 150–400)
PMV BLD AUTO: 11.4 FL (ref 8.9–12.9)
RBC # BLD AUTO: 4.1 M/UL (ref 4.1–5.7)
SERVICE CMNT-IMP: ABNORMAL
WBC # BLD AUTO: 15.5 K/UL (ref 4.1–11.1)

## 2019-12-25 PROCEDURE — 74011250636 HC RX REV CODE- 250/636: Performed by: PHYSICIAN ASSISTANT

## 2019-12-25 PROCEDURE — 65270000029 HC RM PRIVATE

## 2019-12-25 PROCEDURE — 74011636637 HC RX REV CODE- 636/637: Performed by: PHYSICIAN ASSISTANT

## 2019-12-25 PROCEDURE — 74011250637 HC RX REV CODE- 250/637: Performed by: PHYSICIAN ASSISTANT

## 2019-12-25 PROCEDURE — 82962 GLUCOSE BLOOD TEST: CPT

## 2019-12-25 PROCEDURE — 36415 COLL VENOUS BLD VENIPUNCTURE: CPT

## 2019-12-25 PROCEDURE — 85025 COMPLETE CBC W/AUTO DIFF WBC: CPT

## 2019-12-25 RX ORDER — INSULIN LISPRO 100 [IU]/ML
INJECTION, SOLUTION INTRAVENOUS; SUBCUTANEOUS
Status: DISCONTINUED | OUTPATIENT
Start: 2019-12-25 | End: 2019-12-28 | Stop reason: HOSPADM

## 2019-12-25 RX ADMIN — ENOXAPARIN SODIUM 40 MG: 40 INJECTION SUBCUTANEOUS at 11:01

## 2019-12-25 RX ADMIN — ACETAMINOPHEN 650 MG: 325 TABLET ORAL at 23:18

## 2019-12-25 RX ADMIN — INSULIN LISPRO 3 UNITS: 100 INJECTION, SOLUTION INTRAVENOUS; SUBCUTANEOUS at 17:56

## 2019-12-25 RX ADMIN — ACETAMINOPHEN 650 MG: 325 TABLET ORAL at 08:52

## 2019-12-25 RX ADMIN — OXYCODONE 5 MG: 5 TABLET ORAL at 17:56

## 2019-12-25 RX ADMIN — ACETAMINOPHEN 650 MG: 325 TABLET ORAL at 12:07

## 2019-12-25 RX ADMIN — ACETAMINOPHEN 650 MG: 325 TABLET ORAL at 17:56

## 2019-12-25 RX ADMIN — Medication 10 ML: at 08:53

## 2019-12-25 RX ADMIN — ACETAMINOPHEN 650 MG: 325 TABLET ORAL at 04:12

## 2019-12-25 RX ADMIN — Medication 10 ML: at 22:00

## 2019-12-25 RX ADMIN — INSULIN LISPRO 2 UNITS: 100 INJECTION, SOLUTION INTRAVENOUS; SUBCUTANEOUS at 12:57

## 2019-12-25 RX ADMIN — CALCIUM CARBONATE 500 MG (1,250 MG)-VITAMIN D3 200 UNIT TABLET 1 TABLET: at 12:06

## 2019-12-25 NOTE — PROGRESS NOTES
[address] [facility]  [facility]ospitalist [facility]                                                                                          Hospitalist Progress Note  Brittnee Pearl MD  Answering service: [phone] OR [phone] from in house phone        Date of Service:  2019  NAME:  Katey Samayoa  :  1940  MRN:  [id number]      Admission Summary:   Katey Samayoa is a 78 y.o. male with past medical history of hypertension, diabetes mellitus who [facility] on account of closed fracture of the neck of left femur.     Interval history / Subjective:   Patient seen in follow-up of hip fracture. His daughter was in room with him. He denies any complaints at this time. Reports that his pain is moderately controlled. Assessment & Plan:     L hip fracture  - Ortho following, to OR  LEFT HIP HEMIARTHROPLASTY, ANTERIOR APPROACH  - DVT PPX sc lovenox per ortho   - PRN IV morphine and PO oxycodone, high dose scheduled tylenol for pain  - PT/OT post op  -Ambulate as possible. Tyep 2 diabetes - A1c 7.2%  - SSI, POCT glucose checks and hypoglycemia  - Hold metformin while inpatient     Elevated Cr and dehydration on CKD stage 3 - Cr on admit 1.66, improved  - I and O   - Monitor BMP daily    BPH - home tamsulosin     Code status: FULL  DVT prophylaxis: Per Ortho    Care Plan discussed with: Patient/Family  Disposition: TBD SNF when stable in 1 to 2 days. Patient prefers [facility]. POA - daughters. Hospital Problems  Date Reviewed: 2019          Codes Class Noted POA    * (Principal) Femoral neck fracture ([facility] [address] ICD-10-CM: S72.009A  ICD-9-CM: 820.8  2019 Yes        Closed left hip fracture [facility]) ICD-10-CM: R71.601R  ICD-9-CM: 820.8  2019 Unknown                Review of Systems:   A comprehensive review of systems was negative except for that written in the HPI. Vital Signs:    Last 24hrs VS reviewed since prior progress note.  Most recent are:  Visit Vitals  /87   Pulse 99   Temp 100 °F (37.8 °C)   Resp 16   Ht 5' 11\" (1.803 m)   Wt 78.5 kg (173 lb 1 oz)   SpO2 95%   BMI 24.14 kg/m²         Intake/Output Summary (Last 24 hours) at 12/25/2019 1554  Last data filed at 12/25/2019 0320  Gross per 24 hour   Intake    Output 1 ml   Net -1 ml        Physical Examination:             Constitutional:  No acute distress, cooperative, pleasant    ENT:  Oral mucous moist, oropharynx benign. Resp:  CTA bilaterally. No wheezing/rhonchi/rales. No accessory muscle use   CV:  Regular rhythm, normal rate, no murmurs, gallops, rubs    GI:  Soft, non distended, non tender. normoactive bowel sounds, no hepatosplenomegaly     Musculoskeletal:  left hip dressing clean , no signs of bleeding noted. Neurologic:  Moves all extremities. Alert oriented to person place and time. Skin:  Good turgor, no rashes or ulcers       Data Review:    Review and/or order of clinical lab test  Review and/or order of tests in the radiology section of CPT  Review and/or order of tests in the medicine section of CPT      Labs:     Recent Labs     12/25/19  0439 12/24/19  0300   WBC 15.5* 17.3*   HGB 11.7* 12.6   HCT 35.0* 38.1    348     Recent Labs     12/24/19  0300 12/23/19  0642    137   K 4.4 4.3    107   CO2 22 23   BUN 14 19   CREA 1.40* 1.29   * 161*   CA 8.3* 8.7   MG 1.8 1.9   PHOS 3.1 3.1     No results for input(s): SGOT, GPT, ALT, AP, TBIL, TBILI, TP, ALB, GLOB, GGT, AML, LPSE in the last 72 hours. No lab exists for component: AMYP, HLPSE  No results for input(s): INR, PTP, APTT, INREXT, INREXT in the last 72 hours. No results for input(s): FE, TIBC, PSAT, FERR in the last 72 hours. No results found for: FOL, RBCF   No results for input(s): PH, PCO2, PO2 in the last 72 hours. No results for input(s): CPK, CKNDX, TROIQ in the last 72 hours.     No lab exists for component: CPKMB  No results found for: CHOL, CHOLX, CHLST, CHOLV, HDL, HDLP, LDL, LDLC, DLDLP, TGLX, TRIGL, TRIGP, CHHD, CHHDX  Lab Results   Component Value Date/Time    Glucose (POC) 257 (H) 12/25/2019 11:27 AM    Glucose (POC) 159 (H) 12/25/2019 06:24 AM    Glucose (POC) 257 (H) 12/24/2019 04:07 PM    Glucose (POC) 209 (H) 12/24/2019 12:34 PM    Glucose (POC) 162 (H) 12/24/2019 06:00 AM     Lab Results   Component Value Date/Time    Color YELLOW/STRAW 10/11/2017 11:45 PM    Appearance CLEAR 10/11/2017 11:45 PM    Specific gravity 1.027 10/11/2017 11:45 PM    pH (UA) 5.5 10/11/2017 11:45 PM    Protein 100 (A) 10/11/2017 11:45 PM    Glucose NEGATIVE  10/11/2017 11:45 PM    Ketone 40 (A) 10/11/2017 11:45 PM    Bilirubin NEGATIVE  10/11/2017 11:45 PM    Urobilinogen 0.2 10/11/2017 11:45 PM    Nitrites NEGATIVE  10/11/2017 11:45 PM    Leukocyte Esterase TRACE (A) 10/11/2017 11:45 PM    Epithelial cells FEW 10/11/2017 11:45 PM    Bacteria NEGATIVE  10/11/2017 11:45 PM    WBC 0-4 10/11/2017 11:45 PM    RBC 0-5 10/11/2017 11:45 PM         Medications Reviewed:     Current Facility-Administered Medications   Medication Dose Route Frequency    sodium chloride (NS) flush 5-40 mL  5-40 mL IntraVENous Q8H    sodium chloride (NS) flush 5-40 mL  5-40 mL IntraVENous PRN    acetaminophen (TYLENOL) tablet 650 mg  650 mg Oral Q6H    oxyCODONE IR (ROXICODONE) tablet 2.5 mg  2.5 mg Oral Q4H PRN    oxyCODONE IR (ROXICODONE) tablet 5 mg  5 mg Oral Q4H PRN    naloxone (NARCAN) injection 0.4 mg  0.4 mg IntraVENous PRN    ondansetron (ZOFRAN ODT) tablet 4 mg  4 mg Oral Q8H PRN    calcium-vitamin D (OS-NICKO) 500 mg-200 unit tablet  1 Tab Oral TID WITH MEALS    senna-docusate (PERICOLACE) 8.6-50 mg per tablet 1 Tab  1 Tab Oral BID    polyethylene glycol (MIRALAX) packet 17 g  17 g Oral DAILY    bisacodyl (DULCOLAX) suppository 10 mg  10 mg Rectal DAILY PRN    enoxaparin (LOVENOX) injection 40 mg  40 mg SubCUTAneous DAILY    sodium chloride (NS) flush 5-40 mL  5-40 mL IntraVENous Q8H    sodium chloride (NS) flush 5-40 mL  5-40 mL IntraVENous PRN    hydrALAZINE (APRESOLINE) 20 mg/mL injection 10 mg  10 mg IntraVENous Q6H PRN    glucose chewable tablet 16 g  4 Tab Oral PRN    glucagon (GLUCAGEN) injection 1 mg  1 mg IntraMUSCular PRN    insulin lispro (HUMALOG) injection   SubCUTAneous Q6H    dextrose 10 % infusion 125-250 mL  125-250 mL IntraVENous PRN    tamsulosin (FLOMAX) capsule 0.4 mg  0.4 mg Oral DAILY    acetaminophen (TYLENOL) tablet 650 mg  650 mg Oral Q4H PRN    morphine injection 2 mg  2 mg IntraVENous Q4H PRN     ______________________________________________________________________  EXPECTED LENGTH OF STAY: - - -  ACTUAL LENGTH OF STAY:          4                 Britni Vizcaino MD

## 2019-12-25 NOTE — PROGRESS NOTES
Attempted with help to get this patient OOB to chair. We were able to sit and prop on the bedside. Daughter present. We could not safely transfer to a chair and he resisted that activity.

## 2019-12-25 NOTE — PROGRESS NOTES
ORTHO FRACTURE PROGRESS NOTE    2019  Admit Date:   2019    Post Op day: 2 Days Post-Op    Subjective:    Mike Gallagher remains confused but is pleasant today. States pain is controlled. No new complaints. Slow movement with PT but is not very active at baseline.   Denies N/V/SOB or CP    PT/OT:   Gait:                    Vital Signs:    Patient Vitals for the past 8 hrs:   BP Pulse Resp SpO2   19 0815 160/84 98 16 98 %     Temp (24hrs), Av.8 °F (37.1 °C), Min:98.5 °F (36.9 °C), Max:99.1 °F (37.3 °C)      Pain Control:   Pain Assessment  Pain Scale 1: Numeric (0 - 10)  Pain Intensity 1: 0  Pain Onset 1: acute  Pain Location 1: Hip  Pain Orientation 1: Left  Pain Description 1: Aching  Pain Intervention(s) 1: Medication (see MAR)    Meds:    Current Facility-Administered Medications   Medication Dose Route Frequency    sodium chloride (NS) flush 5-40 mL  5-40 mL IntraVENous Q8H    sodium chloride (NS) flush 5-40 mL  5-40 mL IntraVENous PRN    acetaminophen (TYLENOL) tablet 650 mg  650 mg Oral Q6H    oxyCODONE IR (ROXICODONE) tablet 2.5 mg  2.5 mg Oral Q4H PRN    oxyCODONE IR (ROXICODONE) tablet 5 mg  5 mg Oral Q4H PRN    naloxone (NARCAN) injection 0.4 mg  0.4 mg IntraVENous PRN    ondansetron (ZOFRAN ODT) tablet 4 mg  4 mg Oral Q8H PRN    calcium-vitamin D (OS-NICKO) 500 mg-200 unit tablet  1 Tab Oral TID WITH MEALS    senna-docusate (PERICOLACE) 8.6-50 mg per tablet 1 Tab  1 Tab Oral BID    polyethylene glycol (MIRALAX) packet 17 g  17 g Oral DAILY    bisacodyl (DULCOLAX) suppository 10 mg  10 mg Rectal DAILY PRN    enoxaparin (LOVENOX) injection 40 mg  40 mg SubCUTAneous DAILY    sodium chloride (NS) flush 5-40 mL  5-40 mL IntraVENous Q8H    sodium chloride (NS) flush 5-40 mL  5-40 mL IntraVENous PRN    hydrALAZINE (APRESOLINE) 20 mg/mL injection 10 mg  10 mg IntraVENous Q6H PRN    glucose chewable tablet 16 g  4 Tab Oral PRN    glucagon (GLUCAGEN) injection 1 mg  1 mg IntraMUSCular PRN    insulin lispro (HUMALOG) injection   SubCUTAneous Q6H    dextrose 10 % infusion 125-250 mL  125-250 mL IntraVENous PRN    tamsulosin (FLOMAX) capsule 0.4 mg  0.4 mg Oral DAILY    acetaminophen (TYLENOL) tablet 650 mg  650 mg Oral Q4H PRN    morphine injection 2 mg  2 mg IntraVENous Q4H PRN       LAB:    Recent Labs     12/25/19  0439   HCT 35.0*   HGB 11.7*       Transfuse PRBC's:      Assessment & Physician's Comment:  Left thigh soft and compressible  Calves non-tender bilaterally  Moves toes and ankles to command  Distal pulses palpable   Dressing is clean, dry, and intact  Neurovascular checks within normal limits  Orientation:  Disoriented (baseline)    Principal Problem:    Femoral neck fracture ([address] (12/22/2019)    Active Problems:    Closed left hip fracture (HCC) (12/21/2019)        Plan:    Cont PT/OT - WBAT  Pan control  Lovenox for DVT prophylaxis  Ice packs to hip PRN  Medical management per primary team  Case Management for disposition planning - Karen Carson?     Chaim Ackerman PA-C   Orthopedic Trauma Service  [address]

## 2019-12-25 NOTE — PROGRESS NOTES
Bedside shift change report given to Tori Sommer RN (oncoming nurse) by Odell Ortega RN (offgoing nurse). Report included the following information SBAR, Kardex, Intake/Output, MAR and Recent Results.

## 2019-12-25 NOTE — PROGRESS NOTES
Bedside and Verbal shift change report given to Mary Gandhi   (oncoming nurse) by Keily Browne (offgoing nurse). Report included the following information SBAR, Kardex, Intake/Output and MAR.

## 2019-12-26 LAB
GLUCOSE BLD STRIP.AUTO-MCNC: 168 MG/DL (ref 65–100)
GLUCOSE BLD STRIP.AUTO-MCNC: 223 MG/DL (ref 65–100)
GLUCOSE BLD STRIP.AUTO-MCNC: 227 MG/DL (ref 65–100)
GLUCOSE BLD STRIP.AUTO-MCNC: 257 MG/DL (ref 65–100)
SERVICE CMNT-IMP: ABNORMAL

## 2019-12-26 PROCEDURE — 74011636637 HC RX REV CODE- 636/637: Performed by: NURSE PRACTITIONER

## 2019-12-26 PROCEDURE — 82962 GLUCOSE BLOOD TEST: CPT

## 2019-12-26 PROCEDURE — 97116 GAIT TRAINING THERAPY: CPT

## 2019-12-26 PROCEDURE — 74011250636 HC RX REV CODE- 250/636: Performed by: PHYSICIAN ASSISTANT

## 2019-12-26 PROCEDURE — 97530 THERAPEUTIC ACTIVITIES: CPT

## 2019-12-26 PROCEDURE — 65270000029 HC RM PRIVATE

## 2019-12-26 PROCEDURE — 97535 SELF CARE MNGMENT TRAINING: CPT

## 2019-12-26 PROCEDURE — 74011250637 HC RX REV CODE- 250/637: Performed by: PHYSICIAN ASSISTANT

## 2019-12-26 RX ADMIN — OXYCODONE 5 MG: 5 TABLET ORAL at 00:13

## 2019-12-26 RX ADMIN — Medication 10 ML: at 22:21

## 2019-12-26 RX ADMIN — INSULIN LISPRO 2 UNITS: 100 INJECTION, SOLUTION INTRAVENOUS; SUBCUTANEOUS at 12:09

## 2019-12-26 RX ADMIN — Medication 10 ML: at 06:00

## 2019-12-26 RX ADMIN — ACETAMINOPHEN 650 MG: 325 TABLET ORAL at 18:47

## 2019-12-26 RX ADMIN — INSULIN LISPRO 3 UNITS: 100 INJECTION, SOLUTION INTRAVENOUS; SUBCUTANEOUS at 17:10

## 2019-12-26 RX ADMIN — INSULIN LISPRO 2 UNITS: 100 INJECTION, SOLUTION INTRAVENOUS; SUBCUTANEOUS at 22:20

## 2019-12-26 RX ADMIN — ACETAMINOPHEN 650 MG: 325 TABLET ORAL at 00:14

## 2019-12-26 RX ADMIN — ENOXAPARIN SODIUM 40 MG: 40 INJECTION SUBCUTANEOUS at 09:16

## 2019-12-26 RX ADMIN — OXYCODONE 5 MG: 5 TABLET ORAL at 10:00

## 2019-12-26 RX ADMIN — Medication 5 ML: at 14:00

## 2019-12-26 RX ADMIN — ACETAMINOPHEN 650 MG: 325 TABLET ORAL at 12:09

## 2019-12-26 RX ADMIN — ACETAMINOPHEN 650 MG: 325 TABLET ORAL at 09:12

## 2019-12-26 RX ADMIN — ACETAMINOPHEN 650 MG: 325 TABLET ORAL at 07:03

## 2019-12-26 NOTE — PROGRESS NOTES
Problem: Self Care Deficits Care Plan (Adult)  Goal: *Acute Goals and Plan of Care (Insert Text)  Description  FUNCTIONAL STATUS PRIOR TO ADMISSION: Patient was modified independent using a rollator for functional mobility. HOME SUPPORT: The patient lived in [facility] in the memory care unit and had assistance as needed with ADLs. Occupational Therapy Goals  Initiated 12/24/2019  1. Patient will perform lower body ADLs with AE moderate assistance within 4 day(s). 2.  Patient will perform upper body ADLs sitting EOB with supervision/set-up within 4 day(s). 3.  Patient will perform toilet transfer with moderate assistance within 4 day(s). 4.  Patient will perform all aspects of toileting with moderate assistance within 4 day(s). 5.  Patient will participate in upper extremity therapeutic exercise/activities with supervision/set-up for 10 minutes within 4 day(s). 6.  Patient will utilize energy conservation techniques during functional activities without cues within 4 day(s). Outcome: Progressing Towards Goal   OCCUPATIONAL THERAPY TREATMENT  Patient: Jourdan Reyes (42 y.o. male)  Date: 12/26/2019  Diagnosis: Closed left hip fracture (HCC) [S72.002A]  Femoral neck fracture (HCC) [S72.009A]   Femoral neck fracture (HCC)  Procedure(s) (LRB):  HIP ARTHROPLASTY TOTAL ANTERIOR APPROACH (Left) 3 Days Post-Op  Precautions: Fall, WBAT  Chart, occupational therapy assessment, plan of care, and goals were reviewed. ASSESSMENT  Patient continues with skilled OT services and is progressing towards goals. Patient with increased indep with functional mobility necessary to progression towards ADL tasks. Completed multiple sit > stand with max Ax2, pulling up on RW for brief mgmt and transfer to chair with max Ax 2 with heavy UE reliance on RW. Returned later in morning to assist with patient returning to bed with max Ax2 with pulling up on RW with additional time to facilitate weight shifting.   Recommend dc to SNF for additional skilled OT services. Current Level of Function Impacting Discharge (ADLs): UB dressing for gown min A, protective brief total A, self care transfer max Ax2    Other factors to consider for discharge: Patient is presenting below baseline he is requiring max AX2 for self care transfers. Recommend SNF prior to return to Memory Care at [facility]. PLAN :  Patient continues to benefit from skilled intervention to address the above impairments. Continue treatment per established plan of care. to address goals. 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     OBJECTIVE DATA SUMMARY:   Cognitive/Behavioral Status:  Neurologic State: Alert;Confused  Orientation Level: Oriented to person;Oriented to place;Oriented to time;Disoriented to situation  Cognition: Follows commands;Memory loss             Functional Mobility and Transfers for ADLs:  Bed Mobility:  Supine to Sit: Moderate assistance;Assist x2  Sit to Supine: Maximum assistance;Assist x2  Scooting: Maximum assistance;Assist x2    Transfers:  Sit to Stand: Maximum assistance;Assist x2     Bed to Chair: Maximum assistance;Assist x2(chair to bed)    Balance:  Sitting: Impaired;High guard  Sitting - Static: Good (unsupported)  Sitting - Dynamic: Fair (occasional)  Standing: Impaired; With support  Standing - Static: Fair;Constant support  Standing - Dynamic : Poor;Constant support    ADL Intervention:   Cues for safety, sequencing and body mechanics with bed mob, sitting balance, sit > stand and self care transfers. Did well with basic one step cues and additional time for him to increased initiation of the task. Grooming  Washing Face: Set-up; Supervision(seated)  Upper Body [address]: Minimum  assistance    Lower Body Dressing Assistance  Socks: Total assistance (dependent)    Toileting  Bladder Hygiene: Total assistance (dependent)  Clothing Management: Total assistance (dependent)      Pain:  Initial grimacing with sitting with additional time able to increase erect posture and decrease guarding of L hip- received pain meds prior to session    Activity Tolerance:   Fair and limited secondary to LLE pain   Please refer to the flowsheet for vital signs taken during this treatment.     After treatment patient left in no apparent distress:   Sitting in chair, Call bell within reach, and Caregiver / family present    COMMUNICATION/COLLABORATION:   The patients plan of care was discussed with: Physical Therapist and Registered Nurse    Telly Lawrence, OT  Time Calculation: 19 mins

## 2019-12-26 NOTE — PROGRESS NOTES
Bedside and Verbal shift change report given to Berta Hidalgo RN (oncoming nurse) by Beckie Chambers RN (offgoing nurse). Report included the following information SBAR, Kardex, OR Summary, Procedure Summary, Intake/Output, MAR, Accordion and Recent Results.

## 2019-12-26 NOTE — PROGRESS NOTES
Bedside and Verbal shift change report given to Haider Pedroza RN (oncoming nurse) by Zenia Crawford RN (offgoing nurse). Report included the following information SBAR.

## 2019-12-26 NOTE — PROGRESS NOTES
HUMBLE: [facility] and [facility] have denied referral, referrals are pending with [facility] and [facility]. ELO spoke with daughter Zurdo Vieyra [phone]. She would like a referral sent to [facility]. If [facility] is unable to accept, the next choice would be [facility] or [facility]. Referral sent via [email or web address]. [facility] has denied referral. Referral sent to [facility]. Daughter Virgil Godfrey ([phone]) at bedside is aware, she will update her sister Zurdo Vieyra. Daughter Kashif Graves is leaving town today, and the other daughter Zurdo Vieyra #[phone] will be the main contact starting tomorrow.      Fran Bloch, BSW/CRM

## 2019-12-26 NOTE — PROGRESS NOTES
ORTHO PROGRESS NOTE      SUBJECTIVE:  Gela Owusu denies hip pain at rest.    OBJECTIVE:  Patient Vitals for the past 24 hrs:   Temp Pulse BP   12/26/19 1444 97.6 °F (36.4 °C) 90 153/89   12/26/19 0841 98.1 °F (36.7 °C) (!) 101 138/72   12/26/19 0337 98.2 °F (36.8 °C) 87 152/80   12/25/19 1932 98.6 °F (37 °C) 97 133/83       Alert, no distress. Lying in bed. No family present. He recalls my name but doesn't recall hip injury and thinks PT didn't work with him today (although PT did see him per notes). Respirations unlabored. Min pain with genlte PROM. Thigh soft. Dressing CDI. Calf no TTP bilat. Recent Labs     12/25/19  0439 12/24/19  0300   HGB 11.7* 12.6   HCT 35.0* 38.1    348   BUN  --  14   CREA  --  1.40*   GFRAA  --  59*   GFRNA  --  49*       PT/OT:   Gait:  Gait  Base of Support: Widened, Shift to right  Speed/Valarie: Pace decreased (<100 feet/min)  Step Length: Right shortened, Left shortened  Swing Pattern: Right asymmetrical, Left asymmetrical  Stance: Left decreased  Gait Abnormalities: Antalgic, Shuffling gait  Ambulation - Level of Assistance: Maximum assistance, Assist x2  Distance (ft): (3)  Assistive Device: Walker, rolling, Gait belt                 ASSESSMENT:  Procedure: Procedure(s):  HIP ARTHROPLASTY TOTAL ANTERIOR APPROACH  Post Op day: 3 Days Post-Op  Dementia  stable    PLAN:  PT/OT: WBAT LLE with walker. Analgesics: Tylenol scheduled. Limited oxycodone. DVT proph: Lovenox for 10 days, then ASA. Disp planning.    F/U: 3-4 weeks    Yen Rueda 171

## 2019-12-26 NOTE — PROGRESS NOTES
Problem: Mobility Impaired (Adult and Pediatric)  Goal: *Acute Goals and Plan of Care (Insert Text)  Description  FUNCTIONAL STATUS PRIOR TO ADMISSION: Patient required minimal assistance for basic and instrumental ADLs. HOME SUPPORT PRIOR TO ADMISSION: The patient lived in Memory Unit at [facility]. Physical Therapy Goals  Initiated 12/24/2019  1. Patient will move from supine to sit and sit to supine , scoot up and down and roll side to side in bed with moderate assistance  within 7 day(s). 2.  Patient will transfer from bed to chair and chair to bed with moderate assistance  using the least restrictive device within 7 day(s). 3.  Patient will perform sit to stand with moderate assistance  within 7 day(s). 4.  Patient will ambulate with moderate assistance of 2 for 10 feet with the least restrictive device within 7 day(s). 12/26/2019 1157 by Jason Estrada  Outcome: Progressing Towards Goal   PHYSICAL THERAPY TREATMENT  Patient: Bhavna Ang (89 y.o. male)  Date: 12/26/2019  Diagnosis: Closed left hip fracture (HCC) [S72.002A]  Femoral neck fracture (HCC) [S72.009A]   Femoral neck fracture (HCC)  Procedure(s) (LRB):  HIP ARTHROPLASTY TOTAL ANTERIOR APPROACH (Left) 3 Days Post-Op  Precautions: Fall, WBAT  Chart, physical therapy assessment, plan of care and goals were reviewed. ASSESSMENT  Patient continues with skilled PT services and is progressing towards goals. Patient up in chair for 90 minutes, requesting to go back to bed. Performed sit-stand much better. Still having difficulty moving feet for ambulation. Current Level of Function Impacting Discharge (mobility/balance): See Below    Other factors to consider for discharge: Memory Issues/Supportive family/Lives in custodial requiring assistance for all mobility         PLAN :  Patient continues to benefit from skilled intervention to address the above impairments. Continue treatment per established plan of care.   to address goals. Recommendation for discharge: (in order for the patient to meet his/her long term goals)  Therapy up to 5 days/week in SNF setting  in order to achieve maximum level of safe, functional mobility, balance and return to his [facility]. This discharge recommendation:  Has been made in collaboration with the attending provider and/or case management    IF patient discharges home will need the following DME: to be determined (TBD)       SUBJECTIVE:   Patient stated I am ready to go back to bed.     OBJECTIVE DATA SUMMARY:   Critical Behavior:  Neurologic State: Alert, Confused  Orientation Level: Oriented to person, Oriented to place, Oriented to time, Disoriented to situation  Cognition: Follows commands, Memory loss  Safety/Judgement: Decreased awareness of environment, Decreased awareness of need for assistance, Decreased awareness of need for safety, Decreased insight into deficits, Fall prevention  Functional Mobility Training:  Bed Mobility:     Supine to Sit: Moderate assistance;Assist x2  Sit to Supine: Maximum assistance;Assist x2  Scooting: Maximum assistance;Assist x2        Transfers:  Sit to Stand: Maximum assistance;Assist x2  Stand to Sit: Maximum assistance;Assist x2        Bed to Chair: Maximum assistance;Assist x2(chair to bed)                    Balance:  Sitting: Impaired;High guard  Sitting - Static: Good (unsupported)  Sitting - Dynamic: Fair (occasional)  Standing: Impaired; With support  Standing - Static: Fair;Constant support  Standing - Dynamic : Poor;Constant support  Ambulation/Gait Training:  Distance (ft): (3)  Assistive Device: Walker, rolling;Gait belt  Ambulation - Level of Assistance: Maximum assistance;Assist x2        Gait Abnormalities: Antalgic; Shuffling gait     Left Side Weight Bearing: As tolerated  Base of Support: Widened;Shift to right  Stance: Left decreased  Speed/Valarie: Pace decreased (<100 feet/min)  Step Length: Right shortened;Left shortened  Swing Pattern: Right asymmetrical;Left asymmetrical       Activity Tolerance:   Fair    After treatment patient left in no apparent distress:   Supine in bed, Call bell within reach, Bed / chair alarm activated, Caregiver / family present, Side rails x 3, and nurse notified.      COMMUNICATION/COLLABORATION:   The patients plan of care was discussed with: Occupational Therapist and Registered Nurse    You Snyder   Time Calculation: 25 mins

## 2019-12-26 NOTE — PROGRESS NOTES
[address] [facility] Adult  Hospitalist Group                                                                                          Hospitalist Progress Note  Brittnee Pearl MD  Answering service: [phone] OR [phone] from in house phone        Date of Service:  2019  NAME:  Katey Samayoa  :  1940  MRN:  [id number]      Admission Summary:   Katey Samayoa is a 78 y.o. male with past medical history of hypertension, diabetes mellitus who [facility] on account of closed fracture of the neck of left femur.     Interval history / Subjective:   Patient seen in follow-up of hip fracture. His daughter was in room with him. He denies any complaints at this time. Reports that his pain is moderately controlled. Family was updated on plan of care     Assessment & Plan:     L hip fracture  - Ortho following, to OR  LEFT HIP HEMIARTHROPLASTY, ANTERIOR APPROACH  - DVT PPX sc lovenox per ortho   - PRN IV morphine and PO oxycodone, high dose scheduled tylenol for pain  - PT/OT post op  -Ambulate as possible. Tyep 2 diabetes - A1c 7.2%  - SSI, POCT glucose checks and hypoglycemia  - Hold metformin while inpatient     Elevated Cr and dehydration on CKD stage 3 - Cr on admit 1.66, improved  - I and O   - Monitor BMP daily    BPH - home tamsulosin     Code status: FULL  DVT prophylaxis: Per Ortho    Care Plan discussed with: Patient/Family  Disposition: TBD SNF when stable in 1 to 2 days. Patient prefers [facility]. POA - daughters. Discussed with patient's daughter       Hospital Problems  Date Reviewed: 2019          Codes Class Noted POA    * (Principal) [facility] ICD-10-CM: Z87.238C  ICD-9-CM: 820.8  2019 Yes        Closed left hip fracture [facility]) ICD-10-CM: T49.209S  ICD-9-CM: 820.8  2019 Unknown                Review of Systems:   A comprehensive review of systems was negative except for that written in the HPI.        Vital Signs:    Last 24hrs VS reviewed since prior progress note. Most recent are:  Visit Vitals  /89 (BP 1 Location: Left arm, BP Patient Position: At rest)   Pulse 90   Temp 97.6 °F (36.4 °C)   Resp 16   Ht 5' 11\" (1.803 m)   Wt 78.8 kg (173 lb 11.6 oz)   SpO2 96%   BMI 24.23 kg/m²         Intake/Output Summary (Last 24 hours) at 12/26/2019 1654  Last data filed at 12/26/2019 4844  Gross per 24 hour   Intake 540 ml   Output    Net 540 ml        Physical Examination:             Constitutional:  No acute distress, cooperative, pleasant    ENT:  Oral mucous moist, oropharynx benign. Resp:  CTA bilaterally. No wheezing/rhonchi/rales. No accessory muscle use   CV:  Regular rhythm, normal rate, no murmurs, gallops, rubs    GI:  Soft, non distended, non tender. normoactive bowel sounds, no hepatosplenomegaly     Musculoskeletal:  left hip dressing clean , no signs of bleeding noted. Neurologic:  Moves all extremities. Alert oriented to person place and time. Skin:  Good turgor, no rashes or ulcers       Data Review:    Review and/or order of clinical lab test  Review and/or order of tests in the radiology section of CPT  Review and/or order of tests in the medicine section of CPT      Labs:     Recent Labs     12/25/19  0439 12/24/19  0300   WBC 15.5* 17.3*   HGB 11.7* 12.6   HCT 35.0* 38.1    348     Recent Labs     12/24/19  0300      K 4.4      CO2 22   BUN 14   CREA 1.40*   *   CA 8.3*   MG 1.8   PHOS 3.1     No results for input(s): SGOT, GPT, ALT, AP, TBIL, TBILI, TP, ALB, GLOB, GGT, AML, LPSE in the last 72 hours. No lab exists for component: AMYP, HLPSE  No results for input(s): INR, PTP, APTT, INREXT, INREXT in the last 72 hours. No results for input(s): FE, TIBC, PSAT, FERR in the last 72 hours. No results found for: FOL, RBCF   No results for input(s): PH, PCO2, PO2 in the last 72 hours. No results for input(s): CPK, CKNDX, TROIQ in the last 72 hours.     No lab exists for component: CPKMB  No results found for: CHOL, CHOLX, CHLST, CHOLV, HDL, HDLP, LDL, LDLC, DLDLP, TGLX, TRIGL, TRIGP, CHHD, CHHDX  Lab Results   Component Value Date/Time    Glucose (POC) 257 (H) 12/26/2019 04:05 PM    Glucose (POC) 227 (H) 12/26/2019 10:39 AM    Glucose (POC) 168 (H) 12/26/2019 06:31 AM    Glucose (POC) 179 (H) 12/25/2019 09:45 PM    Glucose (POC) 295 (H) 12/25/2019 05:47 PM     Lab Results   Component Value Date/Time    Color YELLOW/STRAW 10/11/2017 11:45 PM    Appearance CLEAR 10/11/2017 11:45 PM    Specific gravity 1.027 10/11/2017 11:45 PM    pH (UA) 5.5 10/11/2017 11:45 PM    Protein 100 (A) 10/11/2017 11:45 PM    Glucose NEGATIVE  10/11/2017 11:45 PM    Ketone 40 (A) 10/11/2017 11:45 PM    Bilirubin NEGATIVE  10/11/2017 11:45 PM    Urobilinogen 0.2 10/11/2017 11:45 PM    Nitrites NEGATIVE  10/11/2017 11:45 PM    Leukocyte Esterase TRACE (A) 10/11/2017 11:45 PM    Epithelial cells FEW 10/11/2017 11:45 PM    Bacteria NEGATIVE  10/11/2017 11:45 PM    WBC 0-4 10/11/2017 11:45 PM    RBC 0-5 10/11/2017 11:45 PM         Medications Reviewed:     Current Facility-Administered Medications   Medication Dose Route Frequency    insulin lispro (HUMALOG) injection   SubCUTAneous AC&HS    sodium chloride (NS) flush 5-40 mL  5-40 mL IntraVENous Q8H    sodium chloride (NS) flush 5-40 mL  5-40 mL IntraVENous PRN    acetaminophen (TYLENOL) tablet 650 mg  650 mg Oral Q6H    oxyCODONE IR (ROXICODONE) tablet 2.5 mg  2.5 mg Oral Q4H PRN    oxyCODONE IR (ROXICODONE) tablet 5 mg  5 mg Oral Q4H PRN    naloxone (NARCAN) injection 0.4 mg  0.4 mg IntraVENous PRN    ondansetron (ZOFRAN ODT) tablet 4 mg  4 mg Oral Q8H PRN    calcium-vitamin D (OS-NICKO) 500 mg-200 unit tablet  1 Tab Oral TID WITH MEALS    senna-docusate (PERICOLACE) 8.6-50 mg per tablet 1 Tab  1 Tab Oral BID    polyethylene glycol (MIRALAX) packet 17 g  17 g Oral DAILY    bisacodyl (DULCOLAX) suppository 10 mg  10 mg Rectal DAILY PRN    enoxaparin (LOVENOX) injection 40 mg  40 mg SubCUTAneous DAILY    sodium chloride (NS) flush 5-40 mL  5-40 mL IntraVENous Q8H    sodium chloride (NS) flush 5-40 mL  5-40 mL IntraVENous PRN    hydrALAZINE (APRESOLINE) 20 mg/mL injection 10 mg  10 mg IntraVENous Q6H PRN    glucose chewable tablet 16 g  4 Tab Oral PRN    glucagon (GLUCAGEN) injection 1 mg  1 mg IntraMUSCular PRN    dextrose 10 % infusion 125-250 mL  125-250 mL IntraVENous PRN    tamsulosin (FLOMAX) capsule 0.4 mg  0.4 mg Oral DAILY    acetaminophen (TYLENOL) tablet 650 mg  650 mg Oral Q4H PRN    morphine injection 2 mg  2 mg IntraVENous Q4H PRN     ______________________________________________________________________  EXPECTED LENGTH OF STAY: - - -  ACTUAL LENGTH OF STAY:          5                 Xochilt Chan MD

## 2019-12-26 NOTE — DIABETES MGMT
[facility]    [facility] Progress Note    Recommendations/ Comments: Review for variable BS in past 24 hours (159-295 mg/dl) Pt eating 75 % of meals. No hypoglycemia since admitted . If appropriate, please consider changing correction scale to normal sensitivity for improved coverage of elevated BS. Current hospital DM medication: lispro insulin correction scale-high sensitivity    Chart reviewed on [employer]. Patient is a 78 y.o. male with known  Type 2 Diabetes on oral agent (monotherapy): metformin (generic) 500 mg bid  at home. A1c:   Lab Results   Component Value Date/Time    Hemoglobin A1c 7.2 (H) 12/22/2019 05:31 AM    Hemoglobin A1c 7.3 (H) 05/26/2018 07:38 AM       Recent Glucose Results:   Lab Results   Component Value Date/Time    GLUCPOC 168 (H) 12/26/2019 06:31 AM    GLUCPOC 179 (H) 12/25/2019 09:45 PM    GLUCPOC 295 (H) 12/25/2019 05:47 PM        Lab Results   Component Value Date/Time    Creatinine 1.40 (H) 12/24/2019 03:00 AM     Estimated Creatinine Clearance: 44.1 mL/min (A) (by C-G formula based on SCr of 1.4 mg/dL (H)). Active Orders   Diet    DIET DIABETIC CONSISTENT CARB Regular        PO intake:   Patient Vitals for the past 72 hrs:   % Diet Eaten   12/26/19 0756 75 %   12/25/19 1257 75 %   12/25/19 0951 75 %       Will continue to follow as needed.     Thank you

## 2019-12-27 LAB
ANION GAP SERPL CALC-SCNC: 6 MMOL/L (ref 5–15)
BASOPHILS # BLD: 0 K/UL (ref 0–0.1)
BASOPHILS NFR BLD: 0 % (ref 0–1)
BUN SERPL-MCNC: 23 MG/DL (ref 6–20)
BUN/CREAT SERPL: 17 (ref 12–20)
CALCIUM SERPL-MCNC: 8.5 MG/DL (ref 8.5–10.1)
CHLORIDE SERPL-SCNC: 106 MMOL/L (ref 97–108)
CO2 SERPL-SCNC: 24 MMOL/L (ref 21–32)
CREAT SERPL-MCNC: 1.37 MG/DL (ref 0.7–1.3)
DIFFERENTIAL METHOD BLD: ABNORMAL
EOSINOPHIL # BLD: 0.4 K/UL (ref 0–0.4)
EOSINOPHIL NFR BLD: 3 % (ref 0–7)
ERYTHROCYTE [DISTWIDTH] IN BLOOD BY AUTOMATED COUNT: 14.5 % (ref 11.5–14.5)
GLUCOSE BLD STRIP.AUTO-MCNC: 177 MG/DL (ref 65–100)
GLUCOSE BLD STRIP.AUTO-MCNC: 183 MG/DL (ref 65–100)
GLUCOSE BLD STRIP.AUTO-MCNC: 191 MG/DL (ref 65–100)
GLUCOSE BLD STRIP.AUTO-MCNC: 275 MG/DL (ref 65–100)
GLUCOSE SERPL-MCNC: 170 MG/DL (ref 65–100)
HCT VFR BLD AUTO: 34.3 % (ref 36.6–50.3)
HGB BLD-MCNC: 11.2 G/DL (ref 12.1–17)
IMM GRANULOCYTES # BLD AUTO: 0.1 K/UL (ref 0–0.04)
IMM GRANULOCYTES NFR BLD AUTO: 1 % (ref 0–0.5)
LYMPHOCYTES # BLD: 1.9 K/UL (ref 0.8–3.5)
LYMPHOCYTES NFR BLD: 15 % (ref 12–49)
MCH RBC QN AUTO: 27.9 PG (ref 26–34)
MCHC RBC AUTO-ENTMCNC: 32.7 G/DL (ref 30–36.5)
MCV RBC AUTO: 85.5 FL (ref 80–99)
MONOCYTES # BLD: 1.3 K/UL (ref 0–1)
MONOCYTES NFR BLD: 10 % (ref 5–13)
NEUTS SEG # BLD: 9.3 K/UL (ref 1.8–8)
NEUTS SEG NFR BLD: 71 % (ref 32–75)
NRBC # BLD: 0 K/UL (ref 0–0.01)
NRBC BLD-RTO: 0 PER 100 WBC
PLATELET # BLD AUTO: 477 K/UL (ref 150–400)
PMV BLD AUTO: 10.7 FL (ref 8.9–12.9)
POTASSIUM SERPL-SCNC: 4.4 MMOL/L (ref 3.5–5.1)
RBC # BLD AUTO: 4.01 M/UL (ref 4.1–5.7)
SERVICE CMNT-IMP: ABNORMAL
SODIUM SERPL-SCNC: 136 MMOL/L (ref 136–145)
WBC # BLD AUTO: 13.1 K/UL (ref 4.1–11.1)

## 2019-12-27 PROCEDURE — 85025 COMPLETE CBC W/AUTO DIFF WBC: CPT

## 2019-12-27 PROCEDURE — 80048 BASIC METABOLIC PNL TOTAL CA: CPT

## 2019-12-27 PROCEDURE — 97535 SELF CARE MNGMENT TRAINING: CPT

## 2019-12-27 PROCEDURE — 65270000029 HC RM PRIVATE

## 2019-12-27 PROCEDURE — 97530 THERAPEUTIC ACTIVITIES: CPT

## 2019-12-27 PROCEDURE — 74011636637 HC RX REV CODE- 636/637: Performed by: NURSE PRACTITIONER

## 2019-12-27 PROCEDURE — 36415 COLL VENOUS BLD VENIPUNCTURE: CPT

## 2019-12-27 PROCEDURE — 74011250637 HC RX REV CODE- 250/637: Performed by: PHYSICIAN ASSISTANT

## 2019-12-27 PROCEDURE — 82962 GLUCOSE BLOOD TEST: CPT

## 2019-12-27 PROCEDURE — 74011250636 HC RX REV CODE- 250/636: Performed by: PHYSICIAN ASSISTANT

## 2019-12-27 RX ORDER — FERROUS SULFATE, DRIED 160(50) MG
1 TABLET, EXTENDED RELEASE ORAL
Status: SHIPPED | COMMUNITY
Start: 2019-12-27

## 2019-12-27 RX ORDER — OXYCODONE HYDROCHLORIDE 5 MG/1
2.5 TABLET ORAL
Qty: 5 TAB | Refills: 0 | Status: SHIPPED | OUTPATIENT
Start: 2019-12-27 | End: 2019-12-30

## 2019-12-27 RX ORDER — INSULIN LISPRO 100 [IU]/ML
INJECTION, SOLUTION INTRAVENOUS; SUBCUTANEOUS
Qty: 1 VIAL | Status: SHIPPED | COMMUNITY
Start: 2019-12-27

## 2019-12-27 RX ORDER — ACETAMINOPHEN 325 MG/1
650 TABLET ORAL EVERY 6 HOURS
Status: SHIPPED | COMMUNITY
Start: 2019-12-27

## 2019-12-27 RX ORDER — NALOXONE HYDROCHLORIDE 0.4 MG/ML
0.4 INJECTION, SOLUTION INTRAMUSCULAR; INTRAVENOUS; SUBCUTANEOUS AS NEEDED
Refills: 0 | Status: SHIPPED | COMMUNITY
Start: 2019-12-27

## 2019-12-27 RX ADMIN — Medication 5 ML: at 06:00

## 2019-12-27 RX ADMIN — SENNOSIDES AND DOCUSATE SODIUM 1 TABLET: 8.6; 5 TABLET ORAL at 09:46

## 2019-12-27 RX ADMIN — ACETAMINOPHEN 650 MG: 325 TABLET ORAL at 06:46

## 2019-12-27 RX ADMIN — Medication 10 ML: at 13:14

## 2019-12-27 RX ADMIN — SENNOSIDES AND DOCUSATE SODIUM 1 TABLET: 8.6; 5 TABLET ORAL at 18:06

## 2019-12-27 RX ADMIN — ACETAMINOPHEN 650 MG: 325 TABLET ORAL at 13:13

## 2019-12-27 RX ADMIN — Medication 10 ML: at 22:45

## 2019-12-27 RX ADMIN — CALCIUM CARBONATE 500 MG (1,250 MG)-VITAMIN D3 200 UNIT TABLET 1 TABLET: at 13:13

## 2019-12-27 RX ADMIN — ACETAMINOPHEN 650 MG: 325 TABLET ORAL at 00:00

## 2019-12-27 RX ADMIN — ACETAMINOPHEN 650 MG: 325 TABLET ORAL at 22:45

## 2019-12-27 RX ADMIN — TAMSULOSIN HYDROCHLORIDE 0.4 MG: 0.4 CAPSULE ORAL at 09:46

## 2019-12-27 RX ADMIN — INSULIN LISPRO 3 UNITS: 100 INJECTION, SOLUTION INTRAVENOUS; SUBCUTANEOUS at 16:57

## 2019-12-27 RX ADMIN — ENOXAPARIN SODIUM 40 MG: 40 INJECTION SUBCUTANEOUS at 09:46

## 2019-12-27 RX ADMIN — ACETAMINOPHEN 650 MG: 325 TABLET ORAL at 18:06

## 2019-12-27 RX ADMIN — CALCIUM CARBONATE 500 MG (1,250 MG)-VITAMIN D3 200 UNIT TABLET 1 TABLET: at 16:57

## 2019-12-27 NOTE — DISCHARGE INSTRUCTIONS
Post-op Discharge Instructions Partial Hip Replacement (Anterior Approach)  Zofia Jesus MD  [facility]  [phone]    Patient Name: Wilbert Gee  Date of admission:  12/21/2019  Date of procedure: 12/23/2019   Procedure: Procedure(s):  HIP ARTHROPLASTY TOTAL ANTERIOR APPROACH  PCP: Angela Joel MD  Date of discharge: No discharge date for patient encounter. Follow up office visit   See Dr. Elton Hensley approximately 3-4 weeks from date of surgery. Call [phone] to make an appointment. Activity  Walk with your walker with weight bearing restrictions as instructed by your physical therapist (weight bearing as tolerated on your surgical leg). Continue using your walker until seen for follow-up visit. You should walk daily with the physical therapist    Perform your exercise routine 3 times a day as instructed by the physical therapist.     Anterior Hip Precautions - Maintain for 6 weeks post-surgery date - always get clarification from the physician before discontinuing  o No straight Leg raises  o No external rotation  o No abduction  o No extension beyond neutral    Incision Care  The Aquacel (brown, waterproof) surgical dressing is to remain on the hip for 7 days. On the 7th day gently peel the dressing off by carefully lifting the edge and stretching it slightly to break the adhesive seal    If your Aquacel dressing comes loose/off before the 7th day, you may replace it with a dry sterile gauze dressing; change it daily. Once the incision is not draining, leave it open to air    You may take a shower with the Aquacel dressing in place. Once the Aquacel is removed,  may shower and get your incision wet but do not submerge your incision under water in a bath tub, hot tub or swimming pool for 6 weeks after surgery.     Preventing blood clots  Lovenox injection 40mg sub q once daily until 14 days post-op then Enteric coated Aspirin 325 mg one tablet once a day for two weeks            Pain management  Continue pain medication as prescribed in the hospital  Continue home medications per medication reconciliation  Place an ice bag on the hip for 15-20 minutes after exercising and as needed throughout the day and night    Diet  Resume usual diet; encourage fluids; provide foods high in fiber, calcium and vitamin D  Provide stool softeners/laxatives as needed      After [address] for SNF/Rehab (to be followed by post-acute provider)    Nursing  Complete head to toe assessment, vital signs  Medication reconciliation  Review pain management  Manage chronic medical conditions  Remove Aquacel dressing 7 days after surgery      Physical Therapy-status at discharge from the hospital    Weight bearing status:  Precautions at Admission: Fall, WBAT          Mobility Status:                Gait:                ADL status overall composite: Toilet Transfer : Maximum assistance(infer per obs of balance, strength)    Physical Therapy-exercises, transfers, gait-training (weight bearing as tolerated on the surgical leg)  Maintain routine hip precautions. Anterior Hip Precautions - Maintain for 6 weeks post-surgery date - always get clarification from the physician before discontinuing  o No straight Leg raises  o No external rotation  o No abduction  o No extension beyond neutral    Exercises related to strengthening the surgical hip:  Supine Exercises: Ankle pumps  Quad sets  Gluteal Sets  Hip Internal Rotation  Abduction/Adduction slides (assisted)  Heel Slides      Standing Exercises:   Heel raises  Mini-squats  Knee flexion  Marching  Internal rotation  Hip abduction: combination of extension/internal rotation and abduction (out to the side and back, no abduction only)    Advance exercises with equipment for strengthening, flexibility, balance needs as appropriate per setting. Repetitions and number of sets to be established per patient tolerance    Reasons to call the Surgeon  1. Increased redness, swelling or drainage from the incision site  2. Temperature consistently greater than 101 degrees  3. Increased pain or unrelieved pain in hip or calf        Blg-8/2014    DVT ppx  DVT proph: Lovenox 30mg daily for 10 days, then ASA.

## 2019-12-27 NOTE — CDMP QUERY
Patient admitted with L femur fracture.    Noted documentation of ADAM on H&P and dehydration on CKD III per 12/23 Kristen White    If possible, please document in progress notes and d/c summary if you are evaluating and /or treating any of the following:    ADAM confirmed and  Dehydration on CKD III ruled out   Dehydration on CKD III confirmed and ADAM ruled out  ADAM and  Dehydration on CKD III confirmed    The medical record reflects the following:    Risk Factors: 78 M resident of SNF, DM II on oral hyperglycemics, HTN    Clinical Indicators: BUN 27 Cr 1.67 on admission    Treatment: IVF and hold oral hyperglycemics     Thank you,  Shanel Hawkins BSN, RN   [phone]

## 2019-12-27 NOTE — PROGRESS NOTES
Bedside and Verbal shift change report given to Sherman Winslow (oncoming nurse) by Rigoberto Biggs (offgoing nurse). Report included the following information SBAR and Kardex.

## 2019-12-27 NOTE — PROGRESS NOTES
Bedside and Verbal shift change report given to [facility], RN (oncoming nurse) by Helena Aldana RN (offgoing nurse). Report included the following information SBAR, Kardex, Intake/Output, MAR and Accordion.

## 2019-12-27 NOTE — DISCHARGE SUMMARY
Discharge Summary       PATIENT ID: Carrol Rodriguez  MRN: [id number]   YOB: 1940    DATE OF ADMISSION: 12/21/2019  6:09 PM    DATE OF DISCHARGE: 12/27/19  PRIMARY CARE PROVIDER: Dharmesh Schaefer MD       DISCHARGING PROVIDER: Xochilt Chan MD    To contact this individual call [phone] and ask the  to page. If unavailable ask to be transferred the Adult Hospitalist Department. CONSULTATIONS: IP CONSULT TO ORTHOPEDIC SURGERY  IP CONSULT TO HOSPITALIST      PROCEDURES/SURGERIES: Procedure(s):  HIP ARTHROPLASTY TOTAL ANTERIOR APPROACH    IMAGING  Xr Chest Sngl V    Result Date: 12/21/2019  IMPRESSION: No acute cardiopulmonary process    Xr Hip Lt W Or Wo Pelv 2-3 Vws    Result Date: 12/21/2019  IMPRESSION: 1. Subcapital femoral neck fracture. Xr Hip Lt During Or Proc    Result Date: 12/23/2019  IMPRESSION: Left hip replacement in progress. Xr Knee Lt 3 V    Result Date: 12/21/2019  IMPRESSION: 1. Subcapital femoral neck fracture. Nc Xr Technologist Service    Result Date: 12/26/2019  IMPRESSION:  FLUOROSCOPY WAS USED. Fluoro Dose:  0.592 mGy vk          ADMITTING DIAGNOSES & HOSPITAL COURSE:   Carrol Rodriguez is a 78 y.o. male with past medical history of hypertension, diabetes mellitus who [facility] account of closed fracture of the neck of left femur. Patient reports that he fell out of a chair. He denies chest pain, shortness of breath. Patient is a 78-year-old  female with past medical history of hypertension, diabetes who [facility] due to closed fracture of left femur. He is status post left hip hemiarthroplasty on 12/23. Postoperatively he did well. He was managed for postoperative atelectasis. There was no signs of fevers. There was no signs of excessive bleeding. He was also managed for dehydration with IV fluids. This has since improved.   At this time he was cleared by orthopedics for discharge. Per orthopedics DVT prophylaxis with Lovenox was recommended for 10 days and then aspirin. He was advised to follow-up in 3 to 4 weeks with orthopedics. At this time he denies chest pain shortness of breath nausea vomiting or diarrhea. I have discussed case in detail with patient's daughters and they are in agreement with discharge to rehab. L hip fracture  - Ortho following, to OR 12/23 LEFT HIP HEMIARTHROPLASTY, ANTERIOR APPROACH  - DVT PPX sc lovenox per ortho   - PRN IV morphine and PO oxycodone, high dose scheduled tylenol for pain  - PT/OT post op  -Ambulate as possible.     Tyep 2 diabetes - A1c 7.2%  - SSI, POCT glucose checks and hypoglycemia  - Hold metformin while inpatient      Elevated Cr and dehydration on CKD stage 3 - Cr on admit 1.66, improved  - I and O   - Monitor BMP daily  Dehydration on CKD III confirmed and ADAM ruled out     BPH - home tamsulosin      Leukocytosis likely reactive in nature. No fevers noted. Resolved. DISCHARGE DIAGNOSES / PLAN:      Patient Active Problem List   Diagnosis Code    Leukocytosis D72.829    Generalized abdominal pain R10.84    ADAM (acute kidney injury) ([address] N17.9    Dizziness and giddiness R42    Benign positional vertigo H81.10    TIA (transient ischemic attack) G45.9    Closed left hip fracture ([address] S72.002A    Femoral neck fracture ([address] S72.009A             FOLLOW UP APPOINTMENTS:    Follow-up Information     Follow up With Specialties Details Why Contact Info    Mandy Harris MD Orthopedic Surgery In 1 week Follow Up [address] [facility]  [phone]      Angela Joel MD    Patient can only remember the practice name and not the physician      [address]  [address] [facility] [address] [phone]           ADDITIONAL CARE RECOMMENDATIONS:   · It is important that you take the medication exactly as they are prescribed.    · Keep your medication in the bottles provided by the pharmacist and keep a list of the medication names, dosages, and times to be taken in your wallet. · Do not take other medications without consulting your doctor. · No drinking alcohol or driving car or operating machinery if you are on narcotic pain medications. Donot take sedating mediations if you are sleepy or confused. · Fall Precautions  · Keep Well Hydrated  · Report to your medical provider if you feel you have  developed allergies to medications  · Follow up with your PCP or Consultant for medication adjustments and refills  · Monitor for signs of fevers,chills,bleeding,chest pain and seek medical attention if you do so. DIET: Diabetic Diet    ACTIVITY: PT/OT Eval and Treat        DISCHARGE MEDICATIONS:  Current Discharge Medication List      START taking these medications    Details   calcium-vitamin D (OYSTER SHELL) 500 mg(1,250mg) -200 unit per tablet Take 1 Tab by mouth three (3) times daily (with meals). insulin lispro (HUMALOG) 100 unit/mL injection AC (before meals), Q6H, and Q4H CORRECTIONAL SCALE only For Blood Sugar (mg/dl) of :             140-199=0 units            200-249=2 units  250-299=3 units  300-349=4 units  350 or greater = Call MD  Give in addition to basal medications. Do Not Hold for NPO  Qty: 1 Vial      naloxone (NARCAN) 0.4 mg/mL injection 1 mL by IntraVENous route as needed for Other (Give if breaths per minute < 10, may repeat dose in 15 min and contact MD). Refills: 0      oxyCODONE IR (ROXICODONE) 5 mg immediate release tablet Take 0.5 Tabs by mouth every four (4) hours as needed for Pain for up to 3 days. Max Daily Amount: 15 mg.  Qty: 5 Tab, Refills: 0    Associated Diagnoses: Closed fracture of neck of left femur, initial encounter ([facility])      acetaminophen (TYLENOL) 325 mg tablet Take 2 Tabs by mouth every six (6) hours.          CONTINUE these medications which have NOT CHANGED    Details   metFORMIN (GLUCOPHAGE) 500 mg tablet Take  by mouth two (2) times daily (with meals). lisinopril (PRINIVIL, ZESTRIL) 5 mg tablet Take 10 mg by mouth daily. tamsulosin (FLOMAX) 0.4 mg capsule Take 0.4 mg by mouth daily. STOP taking these medications       cholecalciferol (VITAMIN D3) (1000 Units /25 mcg) tablet Comments:   Reason for Stopping:         cholecalciferol, vitamin D3, (VITAMIN D3) 2,000 unit tab Comments:   Reason for Stopping:               All Micro Results     Procedure Component Value Units Date/Time    URINE CULTURE HOLD SAMPLE [[id number]] Collected:  12/24/19 7525    Order Status:  Canceled Specimen:  Urine           Recent Results (from the past 24 hour(s))   GLUCOSE, POC    Collection Time: 12/26/19  4:05 PM   Result Value Ref Range    Glucose (POC) 257 (H) 65 - 100 mg/dL    Performed by [id number], POC    Collection Time: 12/26/19  9:18 PM   Result Value Ref Range    Glucose (POC) 223 (H) 65 - 100 mg/dL    Performed by Raymon Dasilva    CBC WITH AUTOMATED DIFF    Collection Time: 12/27/19  1:30 AM   Result Value Ref Range    WBC 13.1 (H) 4.1 - 11.1 K/uL    RBC 4.01 (L) 4.10 - 5.70 M/uL    HGB 11.2 (L) 12.1 - 17.0 g/dL    HCT 34.3 (L) 36.6 - 50.3 %    MCV 85.5 80.0 - 99.0 FL    MCH 27.9 26.0 - 34.0 PG    MCHC 32.7 30.0 - 36.5 g/dL    RDW 14.5 11.5 - 14.5 %    PLATELET 313 (H) 456 - 400 K/uL    MPV 10.7 8.9 - 12.9 FL    NRBC 0.0 0  WBC    ABSOLUTE NRBC 0.00 0.00 - 0.01 K/uL    NEUTROPHILS 71 32 - 75 %    LYMPHOCYTES 15 12 - 49 %    MONOCYTES 10 5 - 13 %    EOSINOPHILS 3 0 - 7 %    BASOPHILS 0 0 - 1 %    IMMATURE GRANULOCYTES 1 (H) 0.0 - 0.5 %    ABS. NEUTROPHILS 9.3 (H) 1.8 - 8.0 K/UL    ABS. LYMPHOCYTES 1.9 0.8 - 3.5 K/UL    ABS. MONOCYTES 1.3 (H) 0.0 - 1.0 K/UL    ABS. EOSINOPHILS 0.4 0.0 - 0.4 K/UL    ABS. BASOPHILS 0.0 0.0 - 0.1 K/UL    ABS. IMM.  GRANS. 0.1 (H) 0.00 - 0.04 K/UL    DF AUTOMATED     METABOLIC PANEL, BASIC    Collection Time: 12/27/19  1:30 AM Result Value Ref Range    Sodium 136 136 - 145 mmol/L    Potassium 4.4 3.5 - 5.1 mmol/L    Chloride 106 97 - 108 mmol/L    CO2 24 21 - 32 mmol/L    Anion gap 6 5 - 15 mmol/L    Glucose 170 (H) 65 - 100 mg/dL    BUN 23 (H) 6 - 20 MG/DL    Creatinine 1.37 (H) 0.70 - 1.30 MG/DL    BUN/Creatinine ratio 17 12 - 20      GFR est AA >60 >60 ml/min/1.73m2    GFR est non-AA 50 (L) >60 ml/min/1.73m2    Calcium 8.5 8.5 - 10.1 MG/DL   GLUCOSE, POC    Collection Time: 12/27/19  6:37 AM   Result Value Ref Range    Glucose (POC) 177 (H) 65 - 100 mg/dL    Performed by Armida Browne            NOTIFY YOUR PHYSICIAN FOR ANY OF THE FOLLOWING:   Fever over 101 degrees for 24 hours. Chest pain, shortness of breath, fever, chills, nausea, vomiting, diarrhea, change in mentation, falling, weakness, bleeding. Severe pain or pain not relieved by medications. Or, any other signs or symptoms that you may have questions about. DISPOSITION:    Home With:   OT  PT  HH  RN      X Long term SNF/Inpatient Rehab    Independent/assisted living    Hospice    Other:       PATIENT CONDITION AT DISCHARGE:     Functional status    Poor    X Deconditioned     Independent      Cognition   X  Lucid     Forgetful     Dementia      Catheters/lines (plus indication)    Granados     PICC     PEG     None      Code status   X  Full code     DNR      PHYSICAL EXAMINATION AT DISCHARGE:  Constitutional:  No acute distress, cooperative, pleasant    ENT:  Oral mucous moist, oropharynx benign. Resp:  CTA bilaterally. No wheezing/rhonchi/rales. No accessory muscle use   CV:  Regular rhythm, normal rate, no murmurs, gallops, rubs    GI:  Soft, non distended, non tender. normoactive bowel sounds, no hepatosplenomegaly     Musculoskeletal:  left hip dressing clean , no signs of bleeding noted. Neurologic:  Moves all extremities. Alert oriented to person place and time.                          Skin:  Good turgor, no rashes or ulcers        CHRONIC MEDICAL DIAGNOSES:  Problem List as of 12/27/2019 Date Reviewed: 12/23/2019          Codes Class Noted - Resolved    * (Principal) Femoral neck fracture ([facility] ICD-10-CM: S72.009A  ICD-9-CM: 820.8  12/22/2019 - Present        Closed left hip fracture ([facility] ICD-10-CM: J28.106F  ICD-9-CM: 820.8  12/21/2019 - Present        TIA (transient ischemic attack) ICD-10-CM: G45.9  ICD-9-CM: 435.9  5/28/2018 - Present        Dizziness and giddiness ICD-10-CM: R42  ICD-9-CM: 780.4  5/26/2018 - Present        Benign positional vertigo ICD-10-CM: H81.10  ICD-9-CM: 386.11  5/26/2018 - Present        Leukocytosis ICD-10-CM: D72.829  ICD-9-CM: 288.60  10/12/2017 - Present        Generalized abdominal pain ICD-10-CM: R10.84  ICD-9-CM: 789.07  10/12/2017 - Present        ADAM (acute kidney injury) [facility]) ICD-10-CM: N17.9  ICD-9-CM: 584.9  10/12/2017 - Present                Discussed with patient and family. Explained the importance of following up, Compliance with medications and recommendations on discharge,Side effect profile of medications were explained. Safety precautions at home and while taking pain medications also explained. All questions answered to the satisfaction of the patient/family. Discussed with consultant(s) who are agreeable to the discharge. Verbal and written instructions on discharge given. Explained about Discharge medications and side effect profile. Advised patient/family to followup with their pcp for medication refills and preauthorization of medications, Home health orders. checkups,screenign programs as appropriate for age. Thank you Other, MD Angela for taking care of your patient, Please call with any questions.       Greater than 35  minutes were spent with the patient on counseling and coordination of care    Signed:   Nadine Mckeon MD  12/27/2019  12:16 PM

## 2019-12-27 NOTE — PROGRESS NOTES
Problem: Self Care Deficits Care Plan (Adult)  Goal: *Acute Goals and Plan of Care (Insert Text)  Description  FUNCTIONAL STATUS PRIOR TO ADMISSION: Patient was modified independent using a rollator for functional mobility. HOME SUPPORT: The patient lived in [facility] in the memory care unit and had assistance as needed with ADLs. Occupational Therapy Goals  Initiated 12/24/2019  1. Patient will perform lower body ADLs with AE moderate assistance within 4 day(s). 2.  Patient will perform upper body ADLs sitting EOB with supervision/set-up within 4 day(s). 3.  Patient will perform toilet transfer with moderate assistance within 4 day(s). 4.  Patient will perform all aspects of toileting with moderate assistance within 4 day(s). 5.  Patient will participate in upper extremity therapeutic exercise/activities with supervision/set-up for 10 minutes within 4 day(s). 6.  Patient will utilize energy conservation techniques during functional activities without cues within 4 day(s). Outcome: Progressing Towards Goal     Problem: Patient Education: Go to Patient Education Activity  Goal: Patient/Family Education  Outcome: Progressing Towards Goal   OCCUPATIONAL THERAPY TREATMENT  Patient: Bonifacio Freeman (62 y.o. male)  Date: 12/27/2019  Diagnosis: Closed left hip fracture ([address] [S72.002A]  Femoral neck fracture ([facility] [S72.009A]   Femoral neck fracture (HCC)  Procedure(s) (LRB):  HIP ARTHROPLASTY TOTAL ANTERIOR APPROACH (Left) 4 Days Post-Op  Precautions: Fall, WBAT  Chart, occupational therapy assessment, plan of care, and goals were reviewed. ASSESSMENT  Patient continues with skilled OT services and is progressing towards goals. Participation impacted by impaired cognition (attention, memory, processing), impaired reach to LEs, impaired left LE mobility. Daughter present for session. Patient received seated in chair with hips forward and trunk partially extended.  Worked on repositioning himself including using bilateral UEs to pull body forward away from back of chair to sit upright, reach to LEs, and put hips back into chair. Patient completed with encouragement and slow increments of movement and completed all but scooting hips back in chair by end of session. Pillow placed behind patient's back to increase posture in chair. Current Level of Function Impacting Discharge (ADLs): UE self care with set up (in simulation); LE self care with max to total assist    Other factors to consider for discharge: From [facility]         PLAN :  Patient continues to benefit from skilled intervention to address the above impairments. Continue treatment per established plan of care. to address goals. Recommend with staff: Elonda Evgeny in chair for meals and self care, UE self care with set up, LE self care with max to total assist    Recommend next OT session: Use of AE for LE dressing    Recommendation for discharge: (in order for the patient to meet his/her long term goals)  Therapy up to 5 days/week in SNF setting    This discharge recommendation:  Has been made in collaboration with the attending provider and/or case management    IF patient discharges home will need the following DME: hospital bed, mechanical lift, rolling walker, and wheelchair       SUBJECTIVE:   Patient stated Is that good enough? . positioning of left LE while seated in chair. OBJECTIVE DATA SUMMARY:   Cognitive/Behavioral Status:  Neurologic State: Alert  Orientation Level: Oriented to place;Oriented to person;Oriented to situation;Disoriented to time  Cognition: Follows commands;Decreased attention/concentration;Memory loss  Perception: Appears intact  Perseveration: No perseveration noted  Safety/Judgement: Awareness of environment    Functional Mobility and Transfers for ADLs:  Bed Mobility:       Transfers:   Patient received seated in chair.           ADL Intervention:       Grooming  Position Performed: Seated in chair  Brushing Teeth: Set-up    Upper Body Bathing  Bathing Assistance: Set-up(in simulation)  Position Performed: Seated in chair    Lower Body Bathing  Lower Body : Maximum assistance(in simulation)  Position Performed: Seated in chair         [address]: Maximum assistance(inferred)  Leg Crossed Method Used: No  Position Performed: Seated in chair         Cognitive Retraining  Organizing/Sequencing: Breaking task down  Attention to Task: Distractibility  Following Commands: Follows one step commands/directions  Safety/Judgement: Awareness of environment  Cues: Verbal cues provided;Visual cues provided      Activity Tolerance:   Fair  Please refer to the flowsheet for vital signs taken during this treatment.     After treatment patient left in no apparent distress:   Sitting in chair, Call bell within reach, and Caregiver / family present    COMMUNICATION/COLLABORATION:   The patients plan of care was discussed with: Registered Nurse    SAMUEL Clements  Time Calculation: 24 mins

## 2019-12-27 NOTE — PROGRESS NOTES
Problem: Mobility Impaired (Adult and Pediatric)  Goal: *Acute Goals and Plan of Care (Insert Text)  Description  FUNCTIONAL STATUS PRIOR TO ADMISSION: Patient required minimal assistance for basic and instrumental ADLs. HOME SUPPORT PRIOR TO ADMISSION: The patient lived in Memory Unit at [facility]. Physical Therapy Goals  Initiated 12/24/2019  1. Patient will move from supine to sit and sit to supine , scoot up and down and roll side to side in bed with moderate assistance  within 7 day(s). 2.  Patient will transfer from bed to chair and chair to bed with moderate assistance  using the least restrictive device within 7 day(s). 3.  Patient will perform sit to stand with moderate assistance  within 7 day(s). 4.  Patient will ambulate with moderate assistance of 2 for 10 feet with the least restrictive device within 7 day(s). 12/27/2019 1404 by Vamsi Mahan  Outcome: Progressing Towards Goal   PHYSICAL THERAPY TREATMENT  Patient: Marcia Wills (23 y.o. male)  Date: 12/27/2019  Diagnosis: Closed left hip fracture (HCC) [S72.002A]  Femoral neck fracture (HCC) [S72.009A]   Femoral neck fracture (HCC)  Procedure(s) (LRB):  HIP ARTHROPLASTY TOTAL ANTERIOR APPROACH (Left) 4 Days Post-Op  Precautions: Fall, WBAT  Chart, physical therapy assessment, plan of care and goals were reviewed. ASSESSMENT  Patient continues with skilled PT services and is progressing towards goals. Patient stayed up in chair for 90 minutes and was quite fatigued, so did not perform mobility as well this afternoon. Current Level of Function Impacting Discharge (mobility/balance): Performed transfer back to chair with maximal assistance of 3. Other factors to consider for discharge: Lives in [address] [place] [address]/Supportive family         PLAN :  Patient continues to benefit from skilled intervention to address the above impairments. Continue treatment per established plan of care.   to address goals. Recommendation for discharge: (in order for the patient to meet his/her long term goals)  Therapy up to 5 days/week in SNF setting    This discharge recommendation:  Has been made in collaboration with the attending provider and/or case management    IF patient discharges home will need the following DME: to be determined (TBD)       SUBJECTIVE:   Patient stated I am pretty tired now.     OBJECTIVE DATA SUMMARY:   Critical Behavior:  Neurologic State: Alert  Orientation Level: Oriented to place, Oriented to person, Oriented to situation, Disoriented to time  Cognition: Follows commands, Decreased attention/concentration, Memory loss  Safety/Judgement: Awareness of environment  Functional Mobility Training:  Bed Mobility:     Supine to Sit: Other (comment)(presented up in chair)  Sit to Supine: Maximum assistance;Assist x2  Scooting: Total assistance;Assist x2        Transfers:  Sit to Stand: Maximum assistance;Assist x2  Stand to Sit: Maximum assistance;Assist x2        Bed to Chair: Maximum assistance; Other (comment)(assist of 3 chair to bed)                    Balance:  Sitting: Impaired;High guard  Sitting - Static: Good (unsupported)  Sitting - Dynamic: Fair (occasional)  Standing: Impaired; With support  Standing - Static: Poor;Constant support  Standing - Dynamic : Poor;Constant support  Ambulation/Gait Training:  Distance (ft): 3 Feet (ft)  Assistive Device: Walker, rolling;Gait belt  Ambulation - Level of Assistance: Maximum assistance;Assist x2        Gait Abnormalities: Antalgic; Shuffling gait;Trunk sway increased     Left Side Weight Bearing: As tolerated  Base of Support: Widened;Shift to right  Stance: Left decreased  Speed/Valarie: Pace decreased (<100 feet/min)  Step Length: Right shortened;Left shortened  Swing Pattern: Right asymmetrical;Left asymmetrical           Activity Tolerance:   Fair      After treatment patient left in no apparent distress:   Supine in bed, Call bell within reach, Bed / chair alarm activated, Caregiver / family present, Side rails x 3, and nurse notified.      COMMUNICATION/COLLABORATION:   The patients plan of care was discussed with: Occupational Therapist, Registered Nurse, and Certified Nursing Assistant/Patient Care Technician    Vicente Campos   Time Calculation: 20 mins

## 2019-12-27 NOTE — PROGRESS NOTES
Problem: Mobility Impaired (Adult and Pediatric)  Goal: *Acute Goals and Plan of Care (Insert Text)  Description  FUNCTIONAL STATUS PRIOR TO ADMISSION: Patient required minimal assistance for basic and instrumental ADLs. HOME SUPPORT PRIOR TO ADMISSION: The patient lived in Memory Unit at [facility]. Physical Therapy Goals  Initiated 12/24/2019  1. Patient will move from supine to sit and sit to supine , scoot up and down and roll side to side in bed with moderate assistance  within 7 day(s). 2.  Patient will transfer from bed to chair and chair to bed with moderate assistance  using the least restrictive device within 7 day(s). 3.  Patient will perform sit to stand with moderate assistance  within 7 day(s). 4.  Patient will ambulate with moderate assistance of 2 for 10 feet with the least restrictive device within 7 day(s). Outcome: Progressing Towards Goal   PHYSICAL THERAPY TREATMENT  Patient: Faith Wakefield (6 y.o. male)  Date: 12/27/2019  Diagnosis: Closed left hip fracture (HCC) [S72.002A]  Femoral neck fracture (HCC) [S72.009A]   Femoral neck fracture (HCC)  Procedure(s) (LRB):  HIP ARTHROPLASTY TOTAL ANTERIOR APPROACH (Left) 4 Days Post-Op  Precautions: Fall, WBAT  Chart, physical therapy assessment, plan of care and goals were reviewed. ASSESSMENT  Patient continues with skilled PT services and is progressing towards goals. Patient required less assistance for bed mobility, but still maximal assistance of 2 for transfers, walking a few steps. Left up in chair for lunch and for OT session. Current Level of Function Impacting Discharge (mobility/balance): See above. Other factors to consider for discharge: Supportive family/Lived in [facility] Memory Unit with assistance for all mobility. PLAN :  Patient continues to benefit from skilled intervention to address the above impairments. Continue treatment per established plan of care. to address goals.     Recommendation for discharge: (in order for the patient to meet his/her long term goals)  Therapy up to 5 days/week in SNF setting    This discharge recommendation:  Has been made in collaboration with the attending provider and/or case management    IF patient discharges home will need the following DME: to be determined (TBD)       SUBJECTIVE:   Patient stated I would like to get up in the chair.     OBJECTIVE DATA SUMMARY:   Critical Behavior:  Neurologic State: Alert  Orientation Level: Oriented to place, Oriented to person, Oriented to situation, Disoriented to time  Cognition: Follows commands, Decreased attention/concentration, Memory loss  Safety/Judgement: Awareness of environment  Functional Mobility Training:  Bed Mobility:     Supine to Sit: Moderate assistance;Assist x2  Sit to Supine: Other (comment)(left up in chair)           Transfers:  Sit to Stand: Maximum assistance;Assist x2  Stand to Sit: Maximum assistance;Assist x2        Bed to Chair: Maximum assistance;Assist x2                    Balance:  Sitting: Impaired;High guard  Sitting - Static: Good (unsupported)  Sitting - Dynamic: Fair (occasional)  Standing: Impaired; With support  Standing - Static: Fair;Constant support  Standing - Dynamic : Poor;Constant support  Ambulation/Gait Training:  Distance (ft): 3 Feet (ft)  Assistive Device: Walker, rolling;Gait belt  Ambulation - Level of Assistance: Maximum assistance;Assist x2        Gait Abnormalities: Antalgic; Shuffling gait;Trunk sway increased     Left Side Weight Bearing: As tolerated  Base of Support: Widened;Shift to right  Stance: Left decreased  Speed/Valarie: Pace decreased (<100 feet/min)  Step Length: Right shortened;Left shortened  Swing Pattern: Right asymmetrical;Left asymmetrical               Activity Tolerance:   Fair    After treatment patient left in no apparent distress:   Sitting in chair, Call bell within reach, Bed / chair alarm activated, Caregiver / family present, and nurse notified. COMMUNICATION/COLLABORATION:   The patients plan of care was discussed with: Occupational Therapist, Registered Nurse, and Certified Nursing Assistant/Patient Care Technician    Stephen Clarke   Time Calculation: 30 mins

## 2019-12-27 NOTE — PROGRESS NOTES
Bedside and Verbal shift change report given to Keren Stewart RN (oncoming nurse) by Andrew Emery RN (offgoing nurse). Report included the following information SBAR, Kardex, Intake/Output, MAR and Recent Results.

## 2019-12-27 NOTE — PROGRESS NOTES
CM notes patient is a Sound Bundle. CM was unaware prior to today that patient is a sound bundle    HUMBLE: [facility] has accepted patient, [facility] cannot do the transport until 7 PM this evening. [facility] cannot accept the patient this late. Will plan for discharge tomorrow. [facility] on will call for Saturday. Neda Kumar [phone] is aware and needs to be contacted tomorrow with transport time. CM spoke with Stephany Huang at [facility]. The referral is pending, they should have an answer this morning. CM spoke with [facility]. They can accept patient. CM met with patient and daughter at bedside. They would like to wait for response from [facility] before making a decision. CM will follow. CM received call from Stephany Huang at [facility], they have accepted patient. CM unable to secure [facility] until 7 PM. Per Pola Dowd at [facility], this is too late for the patient to be transferred. CM placed [facility] on will call for Saturday. Discharge folder located on hard chart to include PCS. RN to call report to #[phone]. Jenni Scott will be available in admissions tomorrow to assist with coordinating discharge.       Nava Garza, BSW/CRM

## 2019-12-27 NOTE — PROGRESS NOTES
[facility] Adult  Hospitalist Group                                                                                          Hospitalist Progress Note  Frankie Durand MD  Answering service: [phone] OR [phone] from in house phone        Date of Service:  2019  NAME:  Estefania Jesus  :  1940  MRN:  [id number]      Admission Summary:   Estefania Jesus is a 78 y.o. male with past medical history of hypertension, diabetes mellitus who [facility] on account of closed fracture of the neck of left femur.     Interval history / Subjective:   Patient seen in follow-up of hip fracture. His daughter was in room with him. He is sitting up in a chair. He denies fever chills nausea vomiting or diarrhea. Assessment & Plan:     L hip fracture  - Ortho following, to OR  LEFT HIP HEMIARTHROPLASTY, ANTERIOR APPROACH  - DVT PPX sc lovenox per ortho   - PRN IV morphine and PO oxycodone, high dose scheduled tylenol for pain  - PT/OT post op  -Ambulate as possible. Tyep 2 diabetes - A1c 7.2%  - SSI, POCT glucose checks and hypoglycemia  - Hold metformin while inpatient     Elevated Cr and dehydration on CKD stage 3 - Cr on admit 1.66, improved  - I and O   - Monitor BMP daily  Dehydration on CKD III confirmed and ADAM ruled out    BPH - home tamsulosin     Leukocytosis likely reactive in nature. No fevers noted. Resolved. Recheck CBC in 1 to 2 days for follow-up  Encourage ICS. Code status: FULL  DVT prophylaxis: Per Ortho    Care Plan discussed with: Patient/Family  Disposition: TBD SNF when bed available  POA - daughters.     Discussed with patient's daughter       Hospital Problems  Date Reviewed: 2019          Codes Class Noted POA    * (Principal) Femoral neck fracture ([facility] [address] ICD-10-CM: J18.463Q  ICD-9-CM: 820.8  2019 Yes        Closed left hip fracture ([address] ICD-10-CM: P21.197P  ICD-9-CM: 820.8  2019 Unknown                Review of Systems:   A comprehensive review of systems was negative except for that written in the HPI. Vital Signs:    Last 24hrs VS reviewed since prior progress note. Most recent are:  Visit Vitals  /82 (BP 1 Location: Left arm, BP Patient Position: At rest)   Pulse 99   Temp 98.7 °F (37.1 °C)   Resp 16   Ht 5' 11\" (1.803 m)   Wt 84.7 kg (186 lb 11.2 oz)   SpO2 96%   BMI 26.04 kg/m²       No intake or output data in the 24 hours ending 12/27/19 1547     Physical Examination:             Constitutional:  No acute distress, cooperative, pleasant    ENT:  Oral mucous moist, oropharynx benign. Resp:  CTA bilaterally. No wheezing/rhonchi/rales. No accessory muscle use   CV:  Regular rhythm, normal rate, no murmurs, gallops, rubs    GI:  Soft, non distended, non tender. normoactive bowel sounds, no hepatosplenomegaly     Musculoskeletal:  left hip dressing clean , no signs of bleeding noted. Neurologic:  Moves all extremities. Alert oriented to person place and time. Skin:  Good turgor, no rashes or ulcers       Data Review:    Review and/or order of clinical lab test  Review and/or order of tests in the radiology section of CPT  Review and/or order of tests in the medicine section of CPT      Labs:     Recent Labs     12/27/19  0130 12/25/19  0439   WBC 13.1* 15.5*   HGB 11.2* 11.7*   HCT 34.3* 35.0*   * 365     Recent Labs     12/27/19  0130      K 4.4      CO2 24   BUN 23*   CREA 1.37*   *   CA 8.5     No results for input(s): SGOT, GPT, ALT, AP, TBIL, TBILI, TP, ALB, GLOB, GGT, AML, LPSE in the last 72 hours. No lab exists for component: AMYP, HLPSE  No results for input(s): INR, PTP, APTT, INREXT, INREXT in the last 72 hours. No results for input(s): FE, TIBC, PSAT, FERR in the last 72 hours. No results found for: FOL, RBCF   No results for input(s): PH, PCO2, PO2 in the last 72 hours. No results for input(s): CPK, CKNDX, TROIQ in the last 72 hours.     No lab exists for component: CPKMB  No results found for: CHOL, CHOLX, CHLST, CHOLV, HDL, HDLP, LDL, LDLC, DLDLP, TGLX, TRIGL, TRIGP, CHHD, CHHDX  Lab Results   Component Value Date/Time    Glucose (POC) 183 (H) 12/27/2019 12:17 PM    Glucose (POC) 177 (H) 12/27/2019 06:37 AM    Glucose (POC) 223 (H) 12/26/2019 09:18 PM    Glucose (POC) 257 (H) 12/26/2019 04:05 PM    Glucose (POC) 227 (H) 12/26/2019 10:39 AM     Lab Results   Component Value Date/Time    Color YELLOW/STRAW 10/11/2017 11:45 PM    Appearance CLEAR 10/11/2017 11:45 PM    Specific gravity 1.027 10/11/2017 11:45 PM    pH (UA) 5.5 10/11/2017 11:45 PM    Protein 100 (A) 10/11/2017 11:45 PM    Glucose NEGATIVE  10/11/2017 11:45 PM    Ketone 40 (A) 10/11/2017 11:45 PM    Bilirubin NEGATIVE  10/11/2017 11:45 PM    Urobilinogen 0.2 10/11/2017 11:45 PM    Nitrites NEGATIVE  10/11/2017 11:45 PM    Leukocyte Esterase TRACE (A) 10/11/2017 11:45 PM    Epithelial cells FEW 10/11/2017 11:45 PM    Bacteria NEGATIVE  10/11/2017 11:45 PM    WBC 0-4 10/11/2017 11:45 PM    RBC 0-5 10/11/2017 11:45 PM         Medications Reviewed:     Current Facility-Administered Medications   Medication Dose Route Frequency    insulin lispro (HUMALOG) injection   SubCUTAneous AC&HS    sodium chloride (NS) flush 5-40 mL  5-40 mL IntraVENous Q8H    sodium chloride (NS) flush 5-40 mL  5-40 mL IntraVENous PRN    acetaminophen (TYLENOL) tablet 650 mg  650 mg Oral Q6H    oxyCODONE IR (ROXICODONE) tablet 2.5 mg  2.5 mg Oral Q4H PRN    oxyCODONE IR (ROXICODONE) tablet 5 mg  5 mg Oral Q4H PRN    naloxone (NARCAN) injection 0.4 mg  0.4 mg IntraVENous PRN    ondansetron (ZOFRAN ODT) tablet 4 mg  4 mg Oral Q8H PRN    calcium-vitamin D (OS-NICKO) 500 mg-200 unit tablet  1 Tab Oral TID WITH MEALS    senna-docusate (PERICOLACE) 8.6-50 mg per tablet 1 Tab  1 Tab Oral BID    polyethylene glycol (MIRALAX) packet 17 g  17 g Oral DAILY    bisacodyl (DULCOLAX) suppository 10 mg  10 mg Rectal DAILY PRN    enoxaparin (LOVENOX) injection 40 mg  40 mg SubCUTAneous DAILY    sodium chloride (NS) flush 5-40 mL  5-40 mL IntraVENous Q8H    sodium chloride (NS) flush 5-40 mL  5-40 mL IntraVENous PRN    hydrALAZINE (APRESOLINE) 20 mg/mL injection 10 mg  10 mg IntraVENous Q6H PRN    glucose chewable tablet 16 g  4 Tab Oral PRN    glucagon (GLUCAGEN) injection 1 mg  1 mg IntraMUSCular PRN    dextrose 10 % infusion 125-250 mL  125-250 mL IntraVENous PRN    tamsulosin (FLOMAX) capsule 0.4 mg  0.4 mg Oral DAILY    morphine injection 2 mg  2 mg IntraVENous Q4H PRN     ______________________________________________________________________  EXPECTED LENGTH OF STAY: 2d 4h  ACTUAL LENGTH OF STAY:          6                 Britni Vizcaino MD

## 2019-12-28 VITALS
HEART RATE: 83 BPM | DIASTOLIC BLOOD PRESSURE: 79 MMHG | TEMPERATURE: 98.3 F | WEIGHT: 186.7 LBS | SYSTOLIC BLOOD PRESSURE: 137 MMHG | OXYGEN SATURATION: 98 % | HEIGHT: 71 IN | RESPIRATION RATE: 16 BRPM | BODY MASS INDEX: 26.14 KG/M2

## 2019-12-28 LAB
GLUCOSE BLD STRIP.AUTO-MCNC: 166 MG/DL (ref 65–100)
GLUCOSE BLD STRIP.AUTO-MCNC: 239 MG/DL (ref 65–100)
SERVICE CMNT-IMP: ABNORMAL
SERVICE CMNT-IMP: ABNORMAL

## 2019-12-28 PROCEDURE — 74011636637 HC RX REV CODE- 636/637: Performed by: NURSE PRACTITIONER

## 2019-12-28 PROCEDURE — 77030038269 HC DRN EXT URIN PURWCK BARD -A

## 2019-12-28 PROCEDURE — 74011250636 HC RX REV CODE- 250/636: Performed by: PHYSICIAN ASSISTANT

## 2019-12-28 PROCEDURE — 82962 GLUCOSE BLOOD TEST: CPT

## 2019-12-28 PROCEDURE — 74011250637 HC RX REV CODE- 250/637: Performed by: PHYSICIAN ASSISTANT

## 2019-12-28 RX ADMIN — ACETAMINOPHEN 650 MG: 325 TABLET ORAL at 12:44

## 2019-12-28 RX ADMIN — INSULIN LISPRO 2 UNITS: 100 INJECTION, SOLUTION INTRAVENOUS; SUBCUTANEOUS at 12:44

## 2019-12-28 RX ADMIN — CALCIUM CARBONATE 500 MG (1,250 MG)-VITAMIN D3 200 UNIT TABLET 1 TABLET: at 12:44

## 2019-12-28 RX ADMIN — SENNOSIDES AND DOCUSATE SODIUM 1 TABLET: 8.6; 5 TABLET ORAL at 09:51

## 2019-12-28 RX ADMIN — ACETAMINOPHEN 650 MG: 325 TABLET ORAL at 07:41

## 2019-12-28 RX ADMIN — Medication 10 ML: at 07:41

## 2019-12-28 RX ADMIN — TAMSULOSIN HYDROCHLORIDE 0.4 MG: 0.4 CAPSULE ORAL at 09:51

## 2019-12-28 RX ADMIN — POLYETHYLENE GLYCOL 3350 17 G: 17 POWDER, FOR SOLUTION ORAL at 09:52

## 2019-12-28 RX ADMIN — ENOXAPARIN SODIUM 40 MG: 40 INJECTION SUBCUTANEOUS at 09:52

## 2019-12-28 RX ADMIN — CALCIUM CARBONATE 500 MG (1,250 MG)-VITAMIN D3 200 UNIT TABLET 1 TABLET: at 09:51

## 2019-12-28 NOTE — PROGRESS NOTES
TRANSFER - OUT REPORT:    Verbal report given to JOSÉ Thomas(name) on [facility]  being transferred to [facility] for routine post - op       Report consisted of patients Situation, Background, Assessment and   Recommendations(SBAR). Information from the following report(s) SBAR, Kardex, OR Summary, Intake/Output, MAR and Recent Results was reviewed with the receiving nurse. Lines:   Peripheral IV 12/21/19 Right Wrist (Active)   Site Assessment Clean, dry, & intact 12/28/2019 12:43 PM   Phlebitis Assessment 0 12/28/2019 12:43 PM   Infiltration Assessment 0 12/28/2019 12:43 PM   Dressing Status Clean, dry, & intact 12/28/2019 12:43 PM   Dressing Type Transparent 12/28/2019 12:43 PM   Hub Color/Line Status Blue;Capped 12/28/2019 12:43 PM   Action Taken Open ports on tubing capped 12/28/2019 12:43 PM   Alcohol Cap Used Yes 12/28/2019 12:43 PM        Opportunity for questions and clarification was provided.       Patient transported with:   patient belongings  Patient packet  Patient transport via [facility]

## 2019-12-28 NOTE — PROGRESS NOTES
Ortho Daily Progress Note      Patient: Eleuterio Chan                   MRN: [id number]  Sex: male  YOB: 1940           Age: 78 y.o.    5 Days Post-Op    Procedure(s): HIP ARTHROPLASTY TOTAL ANTERIOR APPROACH    Subjective: Feeling better, discharging to [facility] today     Visit Vitals  /79 (BP 1 Location: Left arm, BP Patient Position: At rest)   Pulse 83   Temp 98.3 °F (36.8 °C)   Resp 16   Ht 5' 11\" (1.803 m)   Wt 84.7 kg (186 lb 11.2 oz)   SpO2 98%   BMI 26.04 kg/m²        Lab Results:  HGB   Date/Time Value Ref Range Status   12/27/2019 01:30 AM 11.2 (L) 12.1 - 17.0 g/dL Final     INR   Date/Time Value Ref Range Status   12/22/2019 05:31 AM 1.1 0.9 - 1.1   Final     Comment:     A single therapeutic range for Vit K antagonists may not be optimal for all indications - see June, 2008 issue of Chest, American College of Chest Physicians Evidence-Based Clinical Practice Guidelines, 8th Edition.        Physical Exam:    DRESSING: Aquacel dressing in place left anterior hip  SWELLING: mild  NEUROLOGICAL: intact  PULSE:yes   GENERAL: alert, cooperative, no distress, appears stated age  MOTION: Minimal pain with gentle ROM left hip  DVT Exam: No evidence of DVT seen on physical exam.      Plan:    DVT prophylaxisLovenox  Weight bearing restrictionWBAT  Pain Control:stable, mild-to-moderate joint symptoms intermittently, reasonably well controlled by current meds  Dispo: [address], [id number]  12/28/2019   11:32 AM      .

## 2019-12-28 NOTE — PROGRESS NOTES
HUMBLE    This CM was alerted by Nurse Brian Solis) that patient will be ready today. Called [facility] [phone] to alert of time for . Per nurse, she spoke with Dr Aquilino Duenas and patient jer be ready today 12/28/2019. Called and spoke with Daughter Zelalem zhao to make aware of discharge [phone]. [facility] form in chart. Faxed Discharge information to [facility] [phone].     Haleigh Pettit  12:45 PM

## 2020-01-02 NOTE — NURSE NAVIGATOR
[address]  AR Orthopaedic Pathway Handoff     FROM:                                TO: [facility]                                                      ([facility] or Facility name)  Luciana Castro 53  [address] [place] [address]  Dept: [address]: [phone]                                      Room#:  564/01                                                       Nurse Navigator:  Zach Delacruz RN         SITUATION      ASAScore: ASA 3 - Patient with moderate systemic disease with functional limitations    Admitted:  12/21/2019  Hospital Day: 8      Attending Provider:  No att. providers found     Consultations:  IP CONSULT TO ORTHOPEDIC SURGERY    PCP:  Wisam, MD Angela   None     Admitting Dx:  Closed left hip fracture ([address] [S72.002A]  Femoral neck fracture ([address] Oval Mohamud       Principal Problem:    Femoral neck fracture ([address] (12/22/2019)    Active Problems:    Closed left hip fracture ([address] (12/21/2019)      10 Days Post-Op of   Procedure(s):  HIP ARTHROPLASTY TOTAL ANTERIOR APPROACH   BY: Anum Quijano MD             ON: 12/23/2019                  Code Status: Prior             Advance Directive? No Doesnt Have (Send w/patient)     Isolation:  There are currently no Active Isolations       MDRO: No current active infections    BACKGROUND     Allergies: Allergies   Allergen Reactions    Codeine Vertigo    Contrast Dye [Iodine] Unknown (comments)    Pcn [Penicillins] Hives       Past Medical History:   Diagnosis Date    Diabetes ([address]     diet controlled    Hypertension     Meningioma ([address]     Vertigo        Past Surgical History:   Procedure Laterality Date    HX TONSILLECTOMY         Prior to Admission Medications   Prescriptions Last Dose Informant Patient Reported? Taking?   acetaminophen (TYLENOL) 325 mg tablet   Yes Yes   Sig: Take 2 Tabs by mouth every six (6) hours.    calcium-vitamin D (OYSTER SHELL) 500 mg(1,250mg) -200 unit per tablet   Yes Yes   Sig: Take 1 Tab by mouth three (3) times daily (with meals). cholecalciferol (VITAMIN D3) (1000 Units /25 mcg) tablet 2019 at Unknown time  Yes No   Sig: Take  by mouth daily. cholecalciferol, vitamin D3, (VITAMIN D3) 2,000 unit tab 2019 at Unknown time  Yes No   Sig: Take 5,000 Units by mouth. insulin lispro (HUMALOG) 100 unit/mL injection   Yes Yes   Sig: AC (before meals), Q6H, and Q4H CORRECTIONAL SCALE only For Blood Sugar (mg/dl) of :             140-199=0 units            200-249=2 units  250-299=3 units  300-349=4 units  350 or greater = Call MD  Give in addition to basal medications. Do Not Hold for NPO   lisinopril (PRINIVIL, ZESTRIL) 5 mg tablet 2019 at Unknown time  Yes Yes   Sig: Take 10 mg by mouth daily. metFORMIN (GLUCOPHAGE) 500 mg tablet 2019 at Unknown time  Yes Yes   Sig: Take  by mouth two (2) times daily (with meals). naloxone (NARCAN) 0.4 mg/mL injection   Yes Yes   Si mL by IntraVENous route as needed for Other (Give if breaths per minute < 10, may repeat dose in 15 min and contact MD).   tamsulosin (FLOMAX) 0.4 mg capsule Not Taking at Unknown time  Yes No   Sig: Take 0.4 mg by mouth daily. Facility-Administered Medications: None       Vaccinations: There is no immunization history for the selected administration types on file for this patient. ASSESSMENT   Age: 78 y.o. Gender: male        Height: Height: 5' 11\" (180.3 cm)                    Weight:Weight: 84.7 kg (186 lb 11.2 oz)     No data found. Active Orders   There are no active orders of the following types: Diet.        Orientation: Orientation Level: Oriented to situation, Oriented to place, Oriented to person, Disoriented to time    Active Lines/Drains:  (Peg Tube / Granados / CL or S/L?):no    Urinary Status: Incontinent briefs, Incontinent, Voiding      Last BM: Last Bowel Movement Date: 19     Skin Integrity: Intact, Incision (comment), Abrasion(L hip surgical incision/ scattered abrasions)             Mobility: Very limited   Weight Bearing Status: WBAT (Weight Bearing as Tolerated)      Gait Training  Assistive Device: Walker, rolling, Gait belt  Ambulation - Level of Assistance: Maximum assistance, Assist x2  Distance (ft): 3 Feet (ft)     On Anticoagulation? YES  Lovenox  X 10 days, then aspirin                                       Pain Medications given:  acetaminophen                                   Lab Results   Component Value Date/Time    Glucose 170 (H) 12/27/2019 01:30 AM    Hemoglobin A1c 7.2 (H) 12/22/2019 05:31 AM    INR 1.1 12/22/2019 05:31 AM    INR (POC) 1.1 05/26/2018 09:04 AM    HGB 11.2 (L) 12/27/2019 01:30 AM    HGB 11.7 (L) 12/25/2019 04:39 AM    HGB 12.6 12/24/2019 03:00 AM    HGB 14.1 12/23/2019 06:42 AM       Readmission Risks:  Score:         RECOMMENDATION     See After Visit Summary (AVS) for:  · Discharge instructions  · After [address]   · Medication Reconciliation          [facility] Orthopaedic Nurse Navigator  MILES Faulkner, RN-BC       Office  [phone]  Cell      [phone]  Fax      [phone]  [email or web address]             . Sophia

## 2021-07-08 ENCOUNTER — HOSPITAL ENCOUNTER (EMERGENCY)
Age: 81
Discharge: NURSING, REHAB OR LONG-TERM CARE | End: 2021-07-08
Attending: EMERGENCY MEDICINE | Admitting: EMERGENCY MEDICINE
Payer: MEDICARE

## 2021-07-08 VITALS
HEART RATE: 85 BPM | TEMPERATURE: 99.1 F | SYSTOLIC BLOOD PRESSURE: 166 MMHG | DIASTOLIC BLOOD PRESSURE: 96 MMHG | OXYGEN SATURATION: 95 % | RESPIRATION RATE: 18 BRPM

## 2021-07-08 DIAGNOSIS — H81.10 BENIGN PAROXYSMAL POSITIONAL VERTIGO, UNSPECIFIED LATERALITY: Primary | ICD-10-CM

## 2021-07-08 LAB
GLUCOSE BLD STRIP.AUTO-MCNC: 141 MG/DL (ref 65–117)
SERVICE CMNT-IMP: ABNORMAL

## 2021-07-08 PROCEDURE — 93005 ELECTROCARDIOGRAM TRACING: CPT

## 2021-07-08 PROCEDURE — 74011250636 HC RX REV CODE- 250/636: Performed by: EMERGENCY MEDICINE

## 2021-07-08 PROCEDURE — 99284 EMERGENCY DEPT VISIT MOD MDM: CPT

## 2021-07-08 PROCEDURE — 82962 GLUCOSE BLOOD TEST: CPT

## 2021-07-08 RX ORDER — MECLIZINE HCL 12.5 MG 12.5 MG/1
25 TABLET ORAL
Status: COMPLETED | OUTPATIENT
Start: 2021-07-08 | End: 2021-07-08

## 2021-07-08 RX ADMIN — MECLIZINE 25 MG: 12.5 TABLET ORAL at 19:59

## 2021-07-08 NOTE — ED TRIAGE NOTES
Patient arrived via EMS from [address] [facility] with reported dizziness and not eating well today. Daughter with patient states he has inner ear problems and has appointment tomorrow to have and adjustment. Patient is awake and alert, denies complaints at this time.

## 2021-07-08 NOTE — ED PROVIDER NOTES
The history is provided by the patient and a relative. Dizziness  This is a recurrent problem. The current episode started 6 to 12 hours ago. The problem has been resolved. There was no focality noted. Pertinent negatives include no focal weakness, no loss of sensation, no loss of balance, no slurred speech, no speech difficulty, no memory loss, no movement disorder, no agitation, no visual change, no auditory change, no mental status change, no unresponsiveness and no disorientation. There has been no fever. Associated symptoms include nausea. Pertinent negatives include no shortness of breath, no chest pain, no vomiting, no altered mental status, no confusion, no headaches, no choking, no bowel incontinence and no bladder incontinence. There were no medications administered prior to arrival.        Past Medical History:   Diagnosis Date    Diabetes ([facility]     diet controlled    Hypertension     [facility]     Vertigo        Past Surgical History:   Procedure Laterality Date    HX TONSILLECTOMY           No family history on file. Social History     Socioeconomic History    Marital status:      Spouse name: Not on file    Number of children: Not on file    Years of education: Not on file    Highest education level: Not on file   Occupational History    Not on file   Tobacco Use    Smoking status: Never Smoker    Smokeless tobacco: Never Used   Substance and Sexual Activity    Alcohol use: No    Drug use: Not on file    Sexual activity: Not on file   Other Topics Concern    Not on file   Social History Narrative    Not on file     Social Determinants of Health     Financial Resource Strain:     Difficulty of Paying Living Expenses:    Food Insecurity:     Worried About Running Out of Food in the Last Year:     [address] in the Last Year:    Transportation Needs:     Lack of Transportation (Medical):      Lack of Transportation (Non-Medical):    Physical Activity:     Days of Exercise per Week:     Minutes of Exercise per Session:    Stress:     Feeling of Stress :    Social Connections:     Frequency of Communication with Friends and Family:     Frequency of Social Gatherings with Friends and Family:     Attends Yarsanism Services:     Active Member of Clubs or Organizations:     Attends Club or Organization Meetings:     Marital Status:    Intimate Partner Violence:     Fear of Current or Ex-Partner:     Emotionally Abused:     Physically Abused:     Sexually Abused: ALLERGIES: Codeine, Contrast dye [iodine], and Pcn [penicillins]    Review of Systems   Constitutional: Negative for activity change, chills and fever. HENT: Negative for nosebleeds, sore throat, trouble swallowing and voice change. Eyes: Negative for visual disturbance. Respiratory: Negative for choking and shortness of breath. Cardiovascular: Negative for chest pain and palpitations. Gastrointestinal: Positive for nausea. Negative for abdominal pain, bowel incontinence, constipation, diarrhea and vomiting. Genitourinary: Negative for bladder incontinence, difficulty urinating, dysuria, hematuria and urgency. Musculoskeletal: Negative for back pain, neck pain and neck stiffness. Skin: Negative for color change. Allergic/Immunologic: Negative for immunocompromised state. Neurological: Positive for dizziness. Negative for focal weakness, seizures, syncope, speech difficulty, weakness, light-headedness, numbness, headaches and loss of balance. Psychiatric/Behavioral: Negative for agitation, behavioral problems, confusion, hallucinations, memory loss, self-injury and suicidal ideas. Vitals:    07/08/21 1935   BP: (!) 166/96   Pulse: 85   Resp: 18   Temp: 99.1 °F (37.3 °C)   SpO2: 95%            Physical Exam  Vitals and nursing note reviewed. Constitutional:       General: He is not in acute distress. Appearance: He is well-developed. He is not diaphoretic.    HENT: Head: Normocephalic and atraumatic. Eyes:      Extraocular Movements:      Right eye: Nystagmus present. Left eye: Nystagmus present. Pupils: Pupils are equal, round, and reactive to light. Cardiovascular:      Rate and Rhythm: Normal rate and regular rhythm. Heart sounds: Normal heart sounds. No murmur heard. No friction rub. No gallop. Pulmonary:      Effort: Pulmonary effort is normal. No respiratory distress. Breath sounds: Normal breath sounds. No wheezing. Abdominal:      General: Bowel sounds are normal. There is no distension. Palpations: Abdomen is soft. Tenderness: There is no abdominal tenderness. There is no guarding or rebound. Musculoskeletal:         General: Normal range of motion. Cervical back: Normal range of motion and neck supple. Skin:     General: Skin is warm. Findings: No rash. Neurological:      Mental Status: He is alert and oriented to person, place, and time. Psychiatric:         Behavior: Behavior normal.         Thought Content: Thought content normal.         Judgment: Judgment normal.          MDM     This is a 1year-old male with past medical history, review of systems, physical exam as above, presenting with complaints of dizziness. Patient states dizziness associated with nausea, onset approximately 6 hours prior to arrival, and has since resolved. He denies associated symptoms including vomiting, chest pain, shortness of breath. Daughter at bedside states the patient has a history of benign positional vertigo, and has an appointment scheduled with his doctor tomorrow. He states the unit at his memory care center has a new nurse, and she feels the nurse may not have been aware of his pre-existing condition and frequent bouts of vertigo. He denies other symptoms.   Physical exam is remarkable for a well-appearing elderly male, in no acute distress noted to be mildly hypertensive, afebrile without tachycardia, satting well on room air. He has mild bilateral nystagmus, otherwise nonfocal neurologic exam.  Differential discussed with patient and daughter at bedside. This time there deferring work-up given chronic history of similar symptoms, will allow provision of meclizine and point-of-care glucose, EKG. If acute change they are requesting discharge home to follow-up tomorrow as scheduled.     Procedures

## 2021-07-09 ENCOUNTER — HOSPITAL ENCOUNTER (EMERGENCY)
Age: 81
Discharge: NURSING, REHAB OR LONG-TERM CARE | End: 2021-07-09
Attending: EMERGENCY MEDICINE
Payer: MEDICARE

## 2021-07-09 ENCOUNTER — APPOINTMENT (OUTPATIENT)
Dept: CT IMAGING | Age: 81
End: 2021-07-09
Attending: EMERGENCY MEDICINE
Payer: MEDICARE

## 2021-07-09 VITALS
TEMPERATURE: 97.5 F | DIASTOLIC BLOOD PRESSURE: 79 MMHG | HEART RATE: 72 BPM | RESPIRATION RATE: 16 BRPM | OXYGEN SATURATION: 97 % | SYSTOLIC BLOOD PRESSURE: 142 MMHG

## 2021-07-09 DIAGNOSIS — R42 DIZZINESS: Primary | ICD-10-CM

## 2021-07-09 LAB
ALBUMIN SERPL-MCNC: 3.4 G/DL (ref 3.5–5)
ALBUMIN/GLOB SERPL: 1.1 {RATIO} (ref 1.1–2.2)
ALP SERPL-CCNC: 102 U/L (ref 45–117)
ALT SERPL-CCNC: 19 U/L (ref 12–78)
ANION GAP SERPL CALC-SCNC: 7 MMOL/L (ref 5–15)
AST SERPL-CCNC: 9 U/L (ref 15–37)
ATRIAL RATE: 77 BPM
ATRIAL RATE: 81 BPM
BASOPHILS # BLD: 0.1 K/UL (ref 0–0.1)
BASOPHILS NFR BLD: 0 % (ref 0–1)
BILIRUB SERPL-MCNC: 0.9 MG/DL (ref 0.2–1)
BUN SERPL-MCNC: 20 MG/DL (ref 6–20)
BUN/CREAT SERPL: 14 (ref 12–20)
CALCIUM SERPL-MCNC: 9.1 MG/DL (ref 8.5–10.1)
CALCULATED P AXIS, ECG09: 101 DEGREES
CALCULATED P AXIS, ECG09: 40 DEGREES
CALCULATED R AXIS, ECG10: -37 DEGREES
CALCULATED R AXIS, ECG10: -47 DEGREES
CALCULATED T AXIS, ECG11: -14 DEGREES
CALCULATED T AXIS, ECG11: 3 DEGREES
CHLORIDE SERPL-SCNC: 107 MMOL/L (ref 97–108)
CO2 SERPL-SCNC: 25 MMOL/L (ref 21–32)
COMMENT, HOLDF: NORMAL
CREAT SERPL-MCNC: 1.42 MG/DL (ref 0.7–1.3)
DIAGNOSIS, 93000: NORMAL
DIAGNOSIS, 93000: NORMAL
DIFFERENTIAL METHOD BLD: ABNORMAL
EOSINOPHIL # BLD: 0.1 K/UL (ref 0–0.4)
EOSINOPHIL NFR BLD: 0 % (ref 0–7)
ERYTHROCYTE [DISTWIDTH] IN BLOOD BY AUTOMATED COUNT: 15 % (ref 11.5–14.5)
GLOBULIN SER CALC-MCNC: 3.2 G/DL (ref 2–4)
GLUCOSE SERPL-MCNC: 167 MG/DL (ref 65–100)
HCT VFR BLD AUTO: 41 % (ref 36.6–50.3)
HGB BLD-MCNC: 13.9 G/DL (ref 12.1–17)
IMM GRANULOCYTES # BLD AUTO: 0.1 K/UL (ref 0–0.04)
IMM GRANULOCYTES NFR BLD AUTO: 1 % (ref 0–0.5)
LYMPHOCYTES # BLD: 1.7 K/UL (ref 0.8–3.5)
LYMPHOCYTES NFR BLD: 11 % (ref 12–49)
MAGNESIUM SERPL-MCNC: 1.9 MG/DL (ref 1.6–2.4)
MCH RBC QN AUTO: 28.8 PG (ref 26–34)
MCHC RBC AUTO-ENTMCNC: 33.9 G/DL (ref 30–36.5)
MCV RBC AUTO: 84.9 FL (ref 80–99)
MONOCYTES # BLD: 1.1 K/UL (ref 0–1)
MONOCYTES NFR BLD: 7 % (ref 5–13)
NEUTS SEG # BLD: 13.2 K/UL (ref 1.8–8)
NEUTS SEG NFR BLD: 81 % (ref 32–75)
NRBC # BLD: 0 K/UL (ref 0–0.01)
NRBC BLD-RTO: 0 PER 100 WBC
P-R INTERVAL, ECG05: 188 MS
P-R INTERVAL, ECG05: 200 MS
PLATELET # BLD AUTO: 514 K/UL (ref 150–400)
PMV BLD AUTO: 10.5 FL (ref 8.9–12.9)
POTASSIUM SERPL-SCNC: 3.9 MMOL/L (ref 3.5–5.1)
PROT SERPL-MCNC: 6.6 G/DL (ref 6.4–8.2)
Q-T INTERVAL, ECG07: 382 MS
Q-T INTERVAL, ECG07: 398 MS
QRS DURATION, ECG06: 86 MS
QRS DURATION, ECG06: 88 MS
QTC CALCULATION (BEZET), ECG08: 443 MS
QTC CALCULATION (BEZET), ECG08: 450 MS
RBC # BLD AUTO: 4.83 M/UL (ref 4.1–5.7)
SAMPLES BEING HELD,HOLD: NORMAL
SODIUM SERPL-SCNC: 139 MMOL/L (ref 136–145)
TROPONIN I SERPL-MCNC: <0.05 NG/ML
VENTRICULAR RATE, ECG03: 77 BPM
VENTRICULAR RATE, ECG03: 81 BPM
WBC # BLD AUTO: 16.2 K/UL (ref 4.1–11.1)

## 2021-07-09 PROCEDURE — 36415 COLL VENOUS BLD VENIPUNCTURE: CPT

## 2021-07-09 PROCEDURE — 99285 EMERGENCY DEPT VISIT HI MDM: CPT

## 2021-07-09 PROCEDURE — 84484 ASSAY OF TROPONIN QUANT: CPT

## 2021-07-09 PROCEDURE — 96361 HYDRATE IV INFUSION ADD-ON: CPT

## 2021-07-09 PROCEDURE — 93005 ELECTROCARDIOGRAM TRACING: CPT

## 2021-07-09 PROCEDURE — 74011250636 HC RX REV CODE- 250/636: Performed by: EMERGENCY MEDICINE

## 2021-07-09 PROCEDURE — 80053 COMPREHEN METABOLIC PANEL: CPT

## 2021-07-09 PROCEDURE — 83735 ASSAY OF MAGNESIUM: CPT

## 2021-07-09 PROCEDURE — 85025 COMPLETE CBC W/AUTO DIFF WBC: CPT

## 2021-07-09 PROCEDURE — 96374 THER/PROPH/DIAG INJ IV PUSH: CPT

## 2021-07-09 PROCEDURE — 70450 CT HEAD/BRAIN W/O DYE: CPT

## 2021-07-09 RX ORDER — ONDANSETRON 4 MG/1
4 TABLET, ORALLY DISINTEGRATING ORAL
Qty: 10 TABLET | Refills: 0 | Status: SHIPPED | OUTPATIENT
Start: 2021-07-09

## 2021-07-09 RX ORDER — MECLIZINE HCL 12.5 MG 12.5 MG/1
25 TABLET ORAL
Status: COMPLETED | OUTPATIENT
Start: 2021-07-09 | End: 2021-07-09

## 2021-07-09 RX ORDER — ONDANSETRON 2 MG/ML
4 INJECTION INTRAMUSCULAR; INTRAVENOUS
Status: COMPLETED | OUTPATIENT
Start: 2021-07-09 | End: 2021-07-09

## 2021-07-09 RX ORDER — MECLIZINE HYDROCHLORIDE 25 MG/1
25 TABLET ORAL
Qty: 12 TABLET | Refills: 0 | Status: SHIPPED | OUTPATIENT
Start: 2021-07-09

## 2021-07-09 RX ADMIN — MECLIZINE 25 MG: 12.5 TABLET ORAL at 13:38

## 2021-07-09 RX ADMIN — ONDANSETRON 4 MG: 2 INJECTION INTRAMUSCULAR; INTRAVENOUS at 12:54

## 2021-07-09 RX ADMIN — SODIUM CHLORIDE 1000 ML: 9 INJECTION, SOLUTION INTRAVENOUS at 12:54

## 2021-07-09 NOTE — PROGRESS NOTES
Call placed back to Jacob Dumont states she is on the phone currently / [facility] to schedule ride. Expressed appreciation. No additional needs at the is time. Provider will transport back to [facility] [phone].

## 2021-07-09 NOTE — PROGRESS NOTES
SSED/CM consult received and appreciated - transportation resources requested. Case discussed w/ Aliyah Koo NP. Met w/patient and daughter/MPOA Arnold Sahni [phone] - introduced to role of CM. Provided transportation resources  Including [employer]. Informed will need assistance getting back to facility today. CM asked about medical history which includes covid and weakness/ decreased mobility following. Discussed w/c [employer] as option and would be private pay - list provided informed CM can contact on behalf and provide cost/availability. Arnold Sahni in agreement w/ plan. Contact made w/ [facility]) $60 ([phone])/ message for [employer]/ [employer] $75.     1510 VM left for Arnold Sahni informing of above and 5% charge for CC payment [facility].

## 2021-07-09 NOTE — ED TRIAGE NOTES
Discharge instructions given to pt. All questions answered and pt verbalized understanding. Pt assisted into wheel chair with aid of daughter. Pt wheeled out to daughters vehicle, Pt assisted into vehicle with aid of daughter.

## 2021-07-09 NOTE — ED NOTES
Pt assisted into wheelchair with 2 staff members and wheelchair [employer]. Pt left department in wheelchair, pt condition stable. Discharge instructions discussed with pt's daughter prior to her leaving the department. Copies of discharge instructions given to pt and daughter.

## 2021-07-09 NOTE — SENIOR SERVICES NOTE
SSED Visit. Chart Reviewed: HX DM, HTN, Meningioma, vertigo and advanced dementia per daughter. Patient currently residing at [facility], [address]  Patient and daughter greeted in room, I introduced myself and role as SSED NP. Daughter at the bedside, Christopehr Christina [phone]. Per Keyla Longoria, she endorses being frustrated with [facility], she did not want the patient to be brought to the ED b/c he had a scheduled appointment today with ENT specialist, Dr. Abran Forde with [facility] and [address]. She stated that she was told that her father required more assistance than usual this morning and staff did not feel like they could assist him like they usually do, decreased appetite for the last couple days due to having the crystal in the ear. Staff at facility would not wait for daughter to arrive prior to calling 911, when she was in the middle of a home situation concerning her dog who is ill.   Tara Alvarado endorses needing assistance to be able to get patient back and forth to MD appts with increased needs. 1345-  I called patients PCP office, Dr. Colt Chanel for possible assistance in obtaining ab another ENT appt. , message left for Lonny Boss RN to return my call. 26-  I was transferred to the [facility] at [address], I left a message for them to return call in regard to being sent to the ED for the last 2 days. I will collaborate with Dr. Kathi You and Geoffrey Abernathy CM for information to provide to daughter at the bedside, as well as follow-up on Monday to assist with scheduling a new ENT appt. Patient to be discharged back to [address] [place] [address], we will contact daughter to inquire about ride assistance for discharge.      Aliyah Viera, PHIL  SSED  2:34 PM  [phone]

## 2021-07-09 NOTE — ED PROVIDER NOTES
HPI       78y M with hx of frequent episodes of vertigo here with dizziness. Seen yesterday for same. Daughter preferred for minimal workup given long-standing hx of similar. Pt is in memory care unit and not the best historian. States he got meclizine yesterday and it helped. Still some dizziness today but much improved from yesterday. Denies focal weakness or numbness. No speech problems. No fever. Past Medical History:   Diagnosis Date    Diabetes ([facility]     diet controlled    Hypertension     Meningioma ([facility]     Vertigo        Past Surgical History:   Procedure Laterality Date    HX TONSILLECTOMY           History reviewed. No pertinent family history. Social History     Socioeconomic History    Marital status:      Spouse name: Not on file    Number of children: Not on file    Years of education: Not on file    Highest education level: Not on file   Occupational History    Not on file   Tobacco Use    Smoking status: Never Smoker    Smokeless tobacco: Never Used   Substance and Sexual Activity    Alcohol use: No    Drug use: Not on file    Sexual activity: Not on file   Other Topics Concern    Not on file   Social History Narrative    Not on file     Social Determinants of Health     Financial Resource Strain:     Difficulty of Paying Living Expenses:    Food Insecurity:     Worried About Running Out of Food in the Last Year:     [address] in the Last Year:    Transportation Needs:     Lack of Transportation (Medical):      Lack of Transportation (Non-Medical):    Physical Activity:     Days of Exercise per Week:     Minutes of Exercise per Session:    Stress:     Feeling of Stress :    Social Connections:     Frequency of Communication with Friends and Family:     Frequency of Social Gatherings with Friends and Family:     Attends Hinduism Services:     Active Member of Clubs or Organizations:     Attends Club or Organization Meetings:     Marital Status: Intimate Partner Violence:     Fear of Current or Ex-Partner:     Emotionally Abused:     Physically Abused:     Sexually Abused: ALLERGIES: Codeine, Contrast dye [iodine], and Pcn [penicillins]    Review of Systems   Review of Systems   Constitutional: (-) weight loss. HEENT: (-) stiff neck   Eyes: (-) discharge. Respiratory: (-) cough. Cardiovascular: (-) syncope. Gastrointestinal: (-) blood in stool. Genitourinary: (-) hematuria. Musculoskeletal: (-) myalgias. Neurological: (-) seizure. Skin: (-) petechiae  Lymph/Immunologic: (-) enlarged lymph nodes  All other systems reviewed and are negative. Vitals:    07/09/21 1210 07/09/21 1245   BP: (!) 152/101 (!) 142/87   Pulse: 77 72   Resp: 16 12   Temp: 98.1 °F (36.7 °C)    SpO2: 94% 94%            Physical Exam Nursing note and vitals reviewed. Constitutional: oriented to person, place, and time. appears elderly and frail. No distress. Head: Normocephalic and atraumatic. Sclera anicteric  Nose: No rhinorrhea  Mouth/Throat: Oropharynx is clear and moist. Pharynx normal  Eyes: Conjunctivae are normal. Pupils are equal, round, and reactive to light. Right eye exhibits no discharge. Left eye exhibits no discharge. Neck: Painless normal range of motion. Neck supple. No LAD. Cardiovascular: Normal rate, regular rhythm, normal heart sounds and intact distal pulses. Exam reveals no gallop and no friction rub. No murmur heard. Pulmonary/Chest:  No respiratory distress. No wheezes. No rales. No rhonchi. No increased work of breathing. No accessory muscle use. Good air exchange throughout. Abdominal: soft, non-tender, no rebound or guarding. No hepatosplenomegaly. Normal bowel sounds throughout. Back: no tenderness to palpation, no deformities, no CVA tenderness  Extremities/Musculoskeletal: Normal range of motion. no tenderness. No edema. Distal extremities are neurovasc intact. Lymphadenopathy:   No adenopathy.    Neurological: CN II-XII intact, 5/5 strength throughout, nl sensation throughout, nl cerebellar function, nl speech/fluency, no pronator drift. Normal mental status. Skin: Skin is warm and dry. No rash noted. No pallor. MDM 78y M here with dizziness. Plan for labs and imaging today. Will give meclizine. Procedures    2:17 PM  Pt feeling better. Workup unremarkable. Daughter at bedside now. Says the big issue has been getting him transported from his facility to see his doctors. Today they were unable to help and the only option was to call EMS and come to the ED. Will give rx for meclizine and she will reschedule appt with ENT that he was supposed to have today. ED EKG interpretation:  Rhythm: normal sinus rhythm; and regular . Rate (approx.): 77; Axis: normal; P wave: normal; QRS interval: normal ; ST/T wave: normal;  This EKG was interpreted by Jennifer Salazar MD,ED Provider.

## 2021-07-09 NOTE — ED TRIAGE NOTES
Pt presents to department via EMS from [facility] with a CC of dizziness. Pt was seen here yesterday for same complaint and discharged with vertigo and plan to f/u with ENT.

## 2022-07-28 NOTE — PROGRESS NOTES
Problem: Mobility Impaired (Adult and Pediatric)  Goal: *Acute Goals and Plan of Care (Insert Text)  Description  FUNCTIONAL STATUS PRIOR TO ADMISSION: Patient required minimal assistance for basic and instrumental ADLs. HOME SUPPORT PRIOR TO ADMISSION: The patient lived in Memory Unit at [facility]. Physical Therapy Goals  Initiated 12/24/2019  1. Patient will move from supine to sit and sit to supine , scoot up and down and roll side to side in bed with moderate assistance  within 7 day(s). 2.  Patient will transfer from bed to chair and chair to bed with moderate assistance  using the least restrictive device within 7 day(s). 3.  Patient will perform sit to stand with moderate assistance  within 7 day(s). 4.  Patient will ambulate with moderate assistance of 2 for 10 feet with the least restrictive device within 7 day(s). Outcome: Progressing Towards Goal   PHYSICAL THERAPY TREATMENT  Patient: Cata Stubbs (64 y.o. male)  Date: 12/26/2019  Diagnosis: Closed left hip fracture (HCC) [S72.002A]  Femoral neck fracture (HCC) [S72.009A]   Femoral neck fracture (HCC)  Procedure(s) (LRB):  HIP ARTHROPLASTY TOTAL ANTERIOR APPROACH (Left) 3 Days Post-Op  Precautions: Fall, WBAT  Chart, physical therapy assessment, plan of care and goals were reviewed. ASSESSMENT  Patient continues with skilled PT services and is progressing towards goals. Patient demonstrated better bed mobility and sitting balance today. Was able to stand x 3 with maximal assistance of 2 for diaper change and transfer to chair. Patient demonstrated better ability to adjust himself and shift weight while sitting today. Will see in pm for second session. Current Level of Function Impacting Discharge (mobility/balance): Bed mobility moderate assistance of 2; transfers maximal assistance of 2. Shuffled with RW and maximal assistance of 2 to chair at bedside.     Other factors to consider for discharge: Memory issues/assistance for all mobility/supportive family         PLAN :  Patient continues to benefit from skilled intervention to address the above impairments. Continue treatment per established plan of care. to address goals. Recommendation for discharge: (in order for the patient to meet his/her long term goals)  Therapy up to 5 days/week in SNF setting    This discharge recommendation:  Has been made in collaboration with the attending provider and/or case management    IF patient discharges home will need the following DME: to be determined (TBD)       SUBJECTIVE:   Patient stated I am doing okay.     OBJECTIVE DATA SUMMARY:   Critical Behavior:  Neurologic State: Alert, Confused  Orientation Level: Oriented to person, Oriented to place, Oriented to time, Disoriented to situation  Cognition: Follows commands, Memory loss  Safety/Judgement: Decreased awareness of environment, Decreased awareness of need for assistance, Decreased awareness of need for safety, Decreased insight into deficits, Fall prevention  Functional Mobility Training:  Bed Mobility:     Supine to Sit: Moderate assistance;Assist x2  Sit to Supine: (left up in chair)           Transfers:  Sit to Stand: Maximum assistance;Assist x2(x 3 reps for diaper change, then to chair)  Stand to Sit: Maximum assistance;Assist x2(3 reps: for diaper change and then to chair)        Bed to Chair: Maximum assistance;Assist x2(Left up in recliner chair with daughter)                    Balance:  Sitting: Impaired;High guard  Sitting - Static: Good (unsupported)  Sitting - Dynamic: Fair (occasional)  Standing: Impaired; With support  Standing - Static: Fair;Constant support  Standing - Dynamic : Poor;Constant support  Ambulation/Gait Training:  Distance (ft): 3 Feet (ft)  Assistive Device: Walker, rolling;Gait belt  Ambulation - Level of Assistance: Maximum assistance;Assist x2        Gait Abnormalities: Antalgic; Shuffling gait(forward flexed posture (baseline per daughter))     Left Side Weight Bearing: As tolerated  Base of Support: Widened;Shift to right  Stance: Left decreased  Speed/Valarie: Pace decreased (<100 feet/min); Shuffled  Step Length: Right shortened;Left shortened  Swing Pattern: Right symmetrical;Left symmetrical                  Therapeutic Exercises: Ankle Pumps  Quad sets (5 second hold)  X 10 reps every hour   Assisted heel slides x 10    Pain Ratin/10    Activity Tolerance:   Fair  Please refer to the flowsheet for vital signs taken during this treatment. After treatment patient left in no apparent distress:   Sitting in chair, Call bell within reach, Bed / chair alarm activated, Caregiver / family present, and nurse notified.      COMMUNICATION/COLLABORATION:   The patients plan of care was discussed with: Registered Nurse, , and Certified [place]   Time Calculation: 45 mins PAST SURGICAL HISTORY:  A-V fistula     A-V fistula     Encounter for dialysis catheter care

## (undated) DEVICE — GARMENT,MEDLINE,DVT,INT,CALF,MED, GEN2: Brand: MEDLINE

## (undated) DEVICE — SYR LR LCK 1ML GRAD NSAF 30ML --

## (undated) DEVICE — SUTURE VCRL SZ 2-0 L36IN ABSRB UD L40MM CT 1/2 CIR J957H

## (undated) DEVICE — SOLUTION IRRIG 1000ML H2O STRL BLT

## (undated) DEVICE — SUTURE STRATAFIX SYMMETRIC PDS + SZ 1 L18IN ABSRB VLT L48MM SXPP1A400

## (undated) DEVICE — SOLUTION IRRIG 3000ML 0.9% SOD CHL FLX CONT 0797208] ICU MEDICAL INC]

## (undated) DEVICE — STERILE POLYISOPRENE POWDER-FREE SURGICAL GLOVES WITH EMOLLIENT COATING: Brand: PROTEXIS

## (undated) DEVICE — HANDPIECE SET WITH BONE CLEANING TIP AND SUCTION TUBE: Brand: INTERPULSE

## (undated) DEVICE — 4-PORT MANIFOLD: Brand: NEPTUNE 2

## (undated) DEVICE — BLADE ELECTRODE: Brand: EDGE

## (undated) DEVICE — DRAPE XR C ARM 41X74IN LF --

## (undated) DEVICE — DRSG AQUACEL SURG 3.5 X 10IN -- CONVERT TO ITEM 370183

## (undated) DEVICE — STERILE POLYISOPRENE POWDER-FREE SURGICAL GLOVES: Brand: PROTEXIS

## (undated) DEVICE — SUTURE VCRL 0 L27IN ABSRB VLT CT L40MM 1/2 CIR TAPERPOINT J352H

## (undated) DEVICE — Device

## (undated) DEVICE — 2108 SERIES SAGITTAL BLADE (18.6 X 0.8 X 73.8MM)

## (undated) DEVICE — DERMABOND SKIN ADH 0.7ML -- DERMABOND ADVANCED 12/BX

## (undated) DEVICE — REM POLYHESIVE ADULT PATIENT RETURN ELECTRODE: Brand: VALLEYLAB

## (undated) DEVICE — SUTURE MCRYL SZ 3-0 L27IN ABSRB UD L19MM PS-2 3/8 CIR PRIM Y427H

## (undated) DEVICE — SCRUB DRY SURG EZ SCRUB BRUSH PREOPERATIVE GRN

## (undated) DEVICE — SUTURE VCRL SZ 2 L54IN ABSRB UD L65MM TP-1 1/2 CIR J880T

## (undated) DEVICE — SYR 10ML LUER LOK 1/5ML GRAD --

## (undated) DEVICE — (D)PREP SKN CHLRAPRP APPL 26ML -- CONVERT TO ITEM 371833

## (undated) DEVICE — COVER,MAYO STAND,STERILE: Brand: MEDLINE

## (undated) DEVICE — INFECTION CONTROL KIT SYS

## (undated) DEVICE — 6619 2 PTNT ISO SYS INCISE AREA&LT;(&GT;&&LT;)&GT;P: Brand: STERI-DRAPE™ IOBAN™ 2

## (undated) DEVICE — NEEDLE SPNL 22GA L3.5IN BLK HUB S STL REG WALL FIT STYL W/

## (undated) DEVICE — PADDING CAST SPEC 6INX4YD COT --

## (undated) DEVICE — TOTAL TRAY, 16FR 10ML SIL FOLEY, URN: Brand: MEDLINE

## (undated) DEVICE — STRAP,POSITIONING,KNEE/BODY,FOAM,4X60": Brand: MEDLINE